# Patient Record
Sex: FEMALE | Race: WHITE | Employment: OTHER | URBAN - METROPOLITAN AREA
[De-identification: names, ages, dates, MRNs, and addresses within clinical notes are randomized per-mention and may not be internally consistent; named-entity substitution may affect disease eponyms.]

---

## 2017-01-04 ENCOUNTER — HOSPITAL ENCOUNTER (OUTPATIENT)
Dept: INTERVENTIONAL RADIOLOGY/VASCULAR | Age: 77
Discharge: HOME | End: 2017-01-04
Attending: RADIOLOGY
Payer: MEDICARE

## 2017-01-04 VITALS
WEIGHT: 151 LBS | HEART RATE: 75 BPM | RESPIRATION RATE: 19 BRPM | DIASTOLIC BLOOD PRESSURE: 74 MMHG | BODY MASS INDEX: 26.75 KG/M2 | OXYGEN SATURATION: 97 % | HEIGHT: 63 IN | SYSTOLIC BLOOD PRESSURE: 165 MMHG | TEMPERATURE: 97.8 F

## 2017-01-04 DIAGNOSIS — L02.91 ABSCESS: ICD-10-CM

## 2017-01-04 PROCEDURE — 74011000250 HC RX REV CODE- 250: Performed by: RADIOLOGY

## 2017-01-04 PROCEDURE — 77030002916 HC SUT ETHLN J&J -A

## 2017-01-04 PROCEDURE — C1769 GUIDE WIRE: HCPCS

## 2017-01-04 PROCEDURE — 49423 EXCHANGE DRAINAGE CATHETER: CPT

## 2017-01-04 PROCEDURE — 77030027478 HC TBNG JP W BLB MRTM -B

## 2017-01-04 PROCEDURE — 74011636320 HC RX REV CODE- 636/320: Performed by: RADIOLOGY

## 2017-01-04 PROCEDURE — C1729 CATH, DRAINAGE: HCPCS

## 2017-01-04 RX ORDER — LIDOCAINE HYDROCHLORIDE 20 MG/ML
100-200 INJECTION, SOLUTION INFILTRATION; PERINEURAL ONCE
Status: COMPLETED | OUTPATIENT
Start: 2017-01-04 | End: 2017-01-04

## 2017-01-04 RX ADMIN — SODIUM BICARBONATE 2 ML: 0.2 INJECTION, SOLUTION INTRAVENOUS at 14:02

## 2017-01-04 RX ADMIN — LIDOCAINE HYDROCHLORIDE 120 MG: 20 INJECTION, SOLUTION INFILTRATION; PERINEURAL at 14:02

## 2017-01-04 RX ADMIN — IOPAMIDOL 8 ML: 612 INJECTION, SOLUTION INTRAVENOUS at 14:04

## 2017-01-04 NOTE — PROGRESS NOTES
Discharge complete. Discharge instructions reviewed with pt. Time for questions allowed. Pt denies any questions at this time. Dressing to abdomen noted to be clean, dry, and intact. Pt assisted to front of hospital where her daughter awaits.      Visit Vitals    /74 (BP 1 Location: Right arm, BP Patient Position: At rest)    Pulse 75    Temp 97.8 °F (36.6 °C)    Resp 19    Ht 5' 3\" (1.6 m)    Wt 68.5 kg (151 lb)    SpO2 97%    Breastfeeding No    BMI 26.75 kg/m2

## 2017-01-04 NOTE — PROGRESS NOTES
TRANSFER - IN REPORT:    Verbal report received from [address], RN (name) on Penny Overton  being received from IR (unit) for routine progression of care      Report consisted of patients Situation, Background, Assessment and   Recommendations(SBAR). Information from the following report(s) Procedure Summary and MAR was reviewed with the receiving nurse. Opportunity for questions and clarification was provided. Assessment completed upon patients arrival to unit and care assumed.

## 2017-01-04 NOTE — IP AVS SNAPSHOT
[address] 
[phone] Patient: Ramos Bob MRN: [id number] OCB:[birth year] You are allergic to the following Allergen Reactions Promethazine Anxiety Hydrocortisone Other (comments) GLAUCOMA Recent Documentation Height Weight Breastfeeding? BMI OB Status Smoking Status 1.6 m 68.5 kg No 26.75 kg/m2 Hysterectomy Former Smoker Emergency Contacts Name Discharge Info Relation Home Work Mobile Leela Clay  Child [2] [phone] Rosa Martel  Child [2] [phone] About your hospitalization You were admitted on:  January 4, 2017 You last received care in the:  [facility] Radiology Specials You were discharged on:  January 4, 2017 Unit phone number:  [phone] Why you were hospitalized Your primary diagnosis was:  Not on File Providers Seen During Your Hospitalizations Provider Role Specialty Primary office phone Argelia Campbell MD Attending Provider Radiology [phone] Your Primary Care Physician (PCP) Primary Care Physician Office Phone Office Fax Liss Hester [phone] Follow-up Information None Current Discharge Medication List  
  
ASK your doctor about these medications Dose & Instructions Dispensing Information Comments Morning Noon Evening Bedtime  
 amLODIPine 5 mg tablet Commonly known as:  Kinza Flores Your next dose is: Today, Tomorrow Other:  _________ TAKE ONE TABLET BY MOUTH EVERY DAY Quantity:  90 Tab Refills:  3 cholecalciferol 1,000 unit Cap Commonly known as:  VITAMIN D3 Your next dose is: Today, Tomorrow Other:  _________ Dose:  1000 Units Take 1,000 Units by mouth daily. Refills:  0  
     
   
   
   
  
 furosemide 20 mg tablet Commonly known as:  LASIX Your next dose is: Today, Tomorrow Other:  _________ Dose:  10 mg Take 0.5 Tabs by mouth every morning. Quantity:  90 Tab Refills:  3 HYDROcodone-acetaminophen 7.5-325 mg per tablet Commonly known as:  Claudio Youngbloodjayme Sadler Your next dose is: Today, Tomorrow Other:  _________ Dose:  1 Tab Take 1 Tab by mouth every eight (8) hours as needed for Pain for up to 30 days. Max Daily Amount: 3 Tabs. Indications: PAIN Quantity:  90 Tab Refills:  0  
     
   
   
   
  
 ondansetron hcl 8 mg tablet Commonly known as:  Ana Beaglshana Your next dose is: Today, Tomorrow Other:  _________ Dose:  8 mg Take 1 Tab by mouth every eight (8) hours as needed for Nausea. Quantity:  20 Tab Refills:  2  
     
   
   
   
  
 pantoprazole 40 mg tablet Commonly known as:  PROTONIX Your next dose is: Today, Tomorrow Other:  _________ Dose:  40 mg Take 1 Tab by mouth Before breakfast and dinner. Quantity:  60 Tab Refills:  1  
     
   
   
   
  
 polyethylene glycol 17 gram/dose powder Commonly known as:  Gege Kem Your next dose is: Today, Tomorrow Other:  _________ Dose:  17 g Take 17 g by mouth daily. May get the over the counter Miralax. Quantity:  234 g Refills:  0 Discharge Instructions Denita [address] Department of Interventional Radiology [facility] 
[phone] Office 
[phone] Fax GENERAL DRAIN DISCHARGE INSTRUCTIONS General Information: A plastic catheter has been inserted through your skin and into area that needs to be drained. Your doctor ordered this procedure to be done in the event of an abscess, or other fluid collection in your body. You will notice a drainage bag attached to the catheter.  When the fluid has all been removed, your doctor will send you back to us for the removal of the catheter. If you are going home with the catheter in place, your doctor may order a home health nurse to come to your house and assist with the care of the catheter. Your doctor will most likely order antibiotic tablets for you to take while you have this tube. Home Care Instructions: You can resume your regular diet and medication regimen. Do not drink alcohol, drive, or make any important legal decisions in the next 24 hours. Do not lift anything heavier than a gallon of milk, or do anything strenuous for the next 24 hours. You should not shower for 24 hours. You should cover the tube with plastic wrap and tape to keep it dry when you shower. You can disconnect the drainage bag while showering. The home health nurse or the nurse who discharges from the hospital will teach you how to do this. Do not take a bath, swim or immerse yourself in water as long as you have this drainage tube. You should clean the tube and change the dressing every  48 hours and as needed. Keep the dressing clean and dry. Keep up with how much drainage you get from the tube and report this to your doctor on each visit. Call If: 
   You should call your Physician and/or the Radiology Nurse if you have any bleeding other than a small spot on your bandage. Call if you have any signs of infection, fever, or increased pain at the site of the tube. Call if you should have leakage around the tube, an increased yellowing of the skin, increased pain in the abdomen, a change in the amount or appearance of the fluid, or if the tube gets pulled out some or all the way out. Follow-Up Instructions: Please see your ordering doctor as he/she has requested. To Reach Us: Interventional Radiology General Nurse Discharge After general anesthesia or intravenous sedation, for 24 hours or while taking prescription Narcotics: · Limit your activities · Do not drive and operate hazardous machinery · Do not make important personal or business decisions · Do  not drink alcoholic beverages · If you have not urinated within 8 hours after discharge, please contact your surgeon on call. * Please give a list of your current medications to your Primary Care Provider. * Please update this list whenever your medications are discontinued, doses are 
   changed, or new medications (including over-the-counter products) are added. * Please carry medication information at all times in case of emergency situations. These are general instructions for a healthy lifestyle: No smoking/ No tobacco products/ Avoid exposure to second hand smoke Surgeon General's Warning:  Quitting smoking now greatly reduces serious risk to your health. Obesity, smoking, and sedentary lifestyle greatly increases your risk for illness A healthy diet, regular physical exercise & weight monitoring are important for maintaining a healthy lifestyle You may be retaining fluid if you have a history of heart failure or if you experience any of the following symptoms:  Weight gain of 3 pounds or more overnight or 5 pounds in a week, increased swelling in our hands or feet or shortness of breath while lying flat in bed. Please call your doctor as soon as you notice any of these symptoms; do not wait until your next office visit. Recognize signs and symptoms of STROKE: 
F-face looks uneven A-arms unable to move or move unevenly S-speech slurred or non-existent T-time-call 911 as soon as signs and symptoms begin-DO NOT go Back to bed or wait to see if you get better-TIME IS BRAIN. If you have any questions about your procedure, please call the Interventional Radiology department at [phone]. After business hours (5pm) and weekends, call the answering service at [phone] and ask for the Radiologist on call to be paged. Patient Signature: 
Date: 1/4/2017 Discharging Nurse: Ibrahima Connell RN Discharge Orders None ACO Transitions of Care Introducing [facility] [employer] offers a voluntary care coordination program to provide high quality service and care to [facility] fee-for-service beneficiaries. Felicity Perez was designed to help you enhance your health and well-being through the following services: ? Transitions of Care  support for individuals who are transitioning from one care setting to another (example: Hospital to home). ? Chronic and Complex Care Coordination  support for individuals and caregivers of those with serious or chronic illnesses or with more than one chronic (ongoing) condition and those who take a number of different medications. If you meet specific medical criteria, a [address] [place] [address] Rd may call you directly to coordinate your care with your primary care physician and your other care providers. For questions about the [facility] programs, please, contact your physicians office. For general questions or additional information about Accountable Care Organizations: 
Please visit www.medicare.gov/acos. html or call 1-800-MEDICARE (8-205.435.7737) TTY users should call 5-902.946.1034. Introducing [facility]! Alexus Cordova introduces [employer] patient portal. Now you can access parts of your medical record, email your doctor's office, and request medication refills online. 1. In your internet browser, go to [email or web address] 2. Click on the First Time User? Click Here link in the Sign In box. You will see the New Member Sign Up page. 3. Enter your [employer] Access Code exactly as it appears below. You will not need to use this code after youve completed the sign-up process. If you do not sign up before the expiration date, you must request a new code.  
 
· [employer] Access Code: [email or web address] 
 Expires: 1/31/2017 11:55 AM 
 
4. Enter the last four digits of your Social Security Number (xxxx) and Date of Birth (mm/dd/yyyy) as indicated and click Submit. You will be taken to the next sign-up page. 5. Create a [employer] ID. This will be your [employer] login ID and cannot be changed, so think of one that is secure and easy to remember. 6. Create a [employer] password. You can change your password at any time. 7. Enter your Password Reset Question and Answer. This can be used at a later time if you forget your password. 8. Enter your e-mail address. You will receive e-mail notification when new information is available in [address] 9. Click Sign Up. You can now view and download portions of your medical record. 10. Click the Download Summary menu link to download a portable copy of your medical information. If you have questions, please visit the Frequently Asked Questions section of the [employer] website. Remember, [employer] is NOT to be used for urgent needs. For medical emergencies, dial 911. Now available from your iPhone and Android! General Information Please provide this summary of care documentation to your next provider. Patient Signature:  ____________________________________________________________ Date:  ____________________________________________________________  
  
[facility] Provider Signature:  ____________________________________________________________ Date:  ____________________________________________________________

## 2017-01-04 NOTE — DISCHARGE INSTRUCTIONS
[address] [facility] [address]  Department of Interventional Radiology  [facility]  [phone] Office  [phone] Fax    GENERAL DRAIN DISCHARGE INSTRUCTIONS    General Information:     A plastic catheter has been inserted through your skin and into area that needs to be drained. Your doctor ordered this procedure to be done in the event of an abscess, or other fluid collection in your body. You will notice a drainage bag attached to the catheter. When the fluid has all been removed, your doctor will send you back to us for the removal of the catheter. If you are going home with the catheter in place, your doctor may order a home health nurse to come to your house and assist with the care of the catheter. Your doctor will most likely order antibiotic tablets for you to take while you have this tube. Home Care Instructions: You can resume your regular diet and medication regimen. Do not drink alcohol, drive, or make any important legal decisions in the next 24 hours. Do not lift anything heavier than a gallon of milk, or do anything strenuous for the next 24 hours. You should not shower for 24 hours. You should cover the tube with plastic wrap and tape to keep it dry when you shower. You can disconnect the drainage bag while showering. The home health nurse or the nurse who discharges from the hospital will teach you how to do this. Do not take a bath, swim or immerse yourself in water as long as you have this drainage tube. You should clean the tube and change the dressing every  48 hours and as needed. Keep the dressing clean and dry. Keep up with how much drainage you get from the tube and report this to your doctor on each visit. Call If:     You should call your Physician and/or the Radiology Nurse if you have any bleeding other than a small spot on your bandage.  Call if you have any signs of infection, fever, or increased pain at the site of the tube. Call if you should have leakage around the tube, an increased yellowing of the skin, increased pain in the abdomen, a change in the amount or appearance of the fluid, or if the tube gets pulled out some or all the way out. Follow-Up Instructions: Please see your ordering doctor as he/she has requested. To Reach Us: Interventional Radiology General Nurse Discharge    After general anesthesia or intravenous sedation, for 24 hours or while taking prescription Narcotics:  · Limit your activities  · Do not drive and operate hazardous machinery  · Do not make important personal or business decisions  · Do  not drink alcoholic beverages  · If you have not urinated within 8 hours after discharge, please contact your surgeon on call. * Please give a list of your current medications to your Primary Care Provider. * Please update this list whenever your medications are discontinued, doses are     changed, or new medications (including over-the-counter products) are added. * Please carry medication information at all times in case of emergency situations. These are general instructions for a healthy lifestyle:    No smoking/ No tobacco products/ Avoid exposure to second hand smoke  Surgeon General's Warning:  Quitting smoking now greatly reduces serious risk to your health. Obesity, smoking, and sedentary lifestyle greatly increases your risk for illness  A healthy diet, regular physical exercise & weight monitoring are important for maintaining a healthy lifestyle    You may be retaining fluid if you have a history of heart failure or if you experience any of the following symptoms:  Weight gain of 3 pounds or more overnight or 5 pounds in a week, increased swelling in our hands or feet or shortness of breath while lying flat in bed. Please call your doctor as soon as you notice any of these symptoms; do not wait until your next office visit.     Recognize signs and symptoms of STROKE:  F-face looks uneven    A-arms unable to move or move unevenly    S-speech slurred or non-existent    T-time-call 911 as soon as signs and symptoms begin-DO NOT go       Back to bed or wait to see if you get better-TIME IS BRAIN. If you have any questions about your procedure, please call the Interventional Radiology department at [phone]. After business hours (5pm) and weekends, call the answering service at [phone] and ask for the Radiologist on call to be paged.      Patient Signature:  Date: 1/4/2017  Discharging Nurse: Mich Helton RN

## 2017-01-04 NOTE — PROGRESS NOTES
TRANSFER - IN REPORT:    Verbal report received from [address], RN (name) on Melodie Nunes  being received from IR (unit) for routine progression of care      Report consisted of patients Situation, Background, Assessment and   Recommendations(SBAR). Information from the following report(s) Procedure Summary and MAR was reviewed with the receiving nurse. Opportunity for questions and clarification was provided. Assessment completed upon patients arrival to unit and care assumed.

## 2017-01-04 NOTE — PROGRESS NOTES
TRANSFER - OUT REPORT:    Verbal report given to [facility] RN(name) on Prachi Bolds  being transferred to IR recovery(unit) for routine progression of care       Report consisted of patients Situation, Background, Assessment and   Recommendations(SBAR). Information from the following report(s) Procedure Summary and MAR was reviewed with the receiving nurse. Lines:       Opportunity for questions and clarification was provided.       Patient transported with:   Registered Nurse

## 2017-01-04 NOTE — PROCEDURES
Interventional Radiology Brief Procedure Note    Patient: Gina Means MRN: [id number]  SSN: [id number]    YOB: 1940  Age: 77 y.o. 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       Follow Up: 2-3 weeks    Signed By: Lorenza Aburto MD     January 4, 2017

## 2017-01-27 ENCOUNTER — HOSPITAL ENCOUNTER (OUTPATIENT)
Dept: INTERVENTIONAL RADIOLOGY/VASCULAR | Age: 77
Discharge: HOME | End: 2017-01-27
Attending: RADIOLOGY
Payer: MEDICARE

## 2017-01-27 VITALS
RESPIRATION RATE: 15 BRPM | OXYGEN SATURATION: 97 % | TEMPERATURE: 98.2 F | WEIGHT: 148 LBS | SYSTOLIC BLOOD PRESSURE: 162 MMHG | DIASTOLIC BLOOD PRESSURE: 72 MMHG | HEART RATE: 66 BPM | BODY MASS INDEX: 26.22 KG/M2 | HEIGHT: 63 IN

## 2017-01-27 DIAGNOSIS — L98.8 FISTULA: ICD-10-CM

## 2017-01-27 PROCEDURE — 49424 ASSESS CYST CONTRAST INJECT: CPT

## 2017-01-27 PROCEDURE — 74011636320 HC RX REV CODE- 636/320: Performed by: RADIOLOGY

## 2017-01-27 PROCEDURE — 77030027478 HC TBNG JP W BLB MRTM -B

## 2017-01-27 PROCEDURE — 74011000250 HC RX REV CODE- 250: Performed by: RADIOLOGY

## 2017-01-27 PROCEDURE — 77030002916 HC SUT ETHLN J&J -A

## 2017-01-27 RX ORDER — LIDOCAINE HYDROCHLORIDE 20 MG/ML
2-20 INJECTION, SOLUTION INFILTRATION; PERINEURAL
Status: DISCONTINUED | OUTPATIENT
Start: 2017-01-27 | End: 2017-01-30 | Stop reason: HOSPADM

## 2017-01-27 RX ADMIN — LIDOCAINE HYDROCHLORIDE 100 MG: 20 INJECTION, SOLUTION INFILTRATION; PERINEURAL at 08:26

## 2017-01-27 RX ADMIN — SODIUM BICARBONATE 2 ML: 0.2 INJECTION, SOLUTION INTRAVENOUS at 08:26

## 2017-01-27 RX ADMIN — IOPAMIDOL 2 ML: 612 INJECTION, SOLUTION INTRAVENOUS at 08:29

## 2017-01-27 NOTE — IP AVS SNAPSHOT
[address] 
[phone] Patient: Antonella Mora MRN: [id number] GSZ:7/1/2010 You are allergic to the following Allergen Reactions Promethazine Anxiety Hydrocortisone Other (comments) GLAUCOMA Recent Documentation Height Weight Breastfeeding? BMI OB Status Smoking Status 1.6 m 67.1 kg No 26.22 kg/m2 Hysterectomy Former Smoker Emergency Contacts Name Discharge Info Relation Home Work Mobile Margret Ortega  Child [2] [phone] Kelechi Lyons  Child [2] [phone] About your hospitalization You were admitted on:  January 27, 2017 You last received care in the:  [facility] You were discharged on:  January 27, 2017 Unit phone number:  [phone] Why you were hospitalized Your primary diagnosis was:  Not on File Providers Seen During Your Hospitalizations Provider Role Specialty Primary office phone Alvaro Chapman MD Attending Provider Radiology [phone] Your Primary Care Physician (PCP) Primary Care Physician Office Phone Office Fax Sva Santiago [phone] Follow-up Information None Current Discharge Medication List  
  
ASK your doctor about these medications Dose & Instructions Dispensing Information Comments Morning Noon Evening Bedtime  
 amLODIPine 5 mg tablet Commonly known as:  Suzon Salle Your next dose is: Today, Tomorrow Other:  _________ TAKE ONE TABLET BY MOUTH EVERY DAY Quantity:  90 Tab Refills:  3 cholecalciferol 1,000 unit Cap Commonly known as:  VITAMIN D3 Your next dose is: Today, Tomorrow Other:  _________ Dose:  1000 Units Take 1,000 Units by mouth daily. Refills:  0  
     
   
   
   
  
 furosemide 20 mg tablet Commonly known as:  LASIX Your next dose is: Today, Tomorrow Other:  _________ Dose:  10 mg Take 0.5 Tabs by mouth every morning. Quantity:  90 Tab Refills:  3 HYDROcodone-acetaminophen 7.5-325 mg per tablet Commonly known as:  Lencho Handler Your next dose is: Today, Tomorrow Other:  _________ Dose:  1 Tab Take 1 Tab by mouth every eight (8) hours as needed for Pain for up to 30 days. Max Daily Amount: 3 Tabs. Indications: PAIN Quantity:  90 Tab Refills:  0  
     
   
   
   
  
 ondansetron hcl 8 mg tablet Commonly known as:  Aaron Kims Your next dose is: Today, Tomorrow Other:  _________ Dose:  8 mg Take 1 Tab by mouth every eight (8) hours as needed for Nausea. Quantity:  20 Tab Refills:  2  
     
   
   
   
  
 pantoprazole 40 mg tablet Commonly known as:  PROTONIX Your next dose is: Today, Tomorrow Other:  _________ Dose:  40 mg Take 1 Tab by mouth Before breakfast and dinner. Quantity:  60 Tab Refills:  1  
     
   
   
   
  
 polyethylene glycol 17 gram/dose powder Commonly known as:  Analimeli Deweysaul Your next dose is: Today, Tomorrow Other:  _________ Dose:  17 g Take 17 g by mouth daily. May get the over the counter Miralax. Quantity:  234 g Refills:  0 Discharge Instructions [address]ita [address] Department of Interventional Radiology [facility] 
[phone] Office 
[phone] Fax GENERAL DRAIN DISCHARGE INSTRUCTIONS General Information: A plastic catheter has been inserted through your skin and into area that needs to be drained. Your doctor ordered this procedure to be done in the event of an abscess, or other fluid collection in your body. You will notice a drainage bag attached to the catheter.  When the fluid has all been removed, your doctor will send you back to us for the removal of the catheter. If you are going home with the catheter in place, your doctor may order a home health nurse to come to your house and assist with the care of the catheter. Your doctor will most likely order antibiotic tablets for you to take while you have this tube. Home Care Instructions: You can resume your regular diet and medication regimen. Do not drink alcohol, drive, or make any important legal decisions in the next 24 hours. Do not lift anything heavier than a gallon of milk, or do anything strenuous for the next 24 hours. You should not shower for 24 hours. You should cover the tube with plastic wrap and tape to keep it dry when you shower. You can disconnect the drainage bag while showering. The home health nurse or the nurse who discharges from the hospital will teach you how to do this. Do not take a bath, swim or immerse yourself in water as long as you have this drainage tube. You should clean the tube and change the dressing every  48 hours and as needed. Keep the dressing clean and dry. Keep up with how much drainage you get from the tube and report this to your doctor on each visit. Call If: 
   You should call your Physician and/or the Radiology Nurse if you have any bleeding other than a small spot on your bandage. Call if you have any signs of infection, fever, or increased pain at the site of the tube. Call if you should have leakage around the tube, an increased yellowing of the skin, increased pain in the abdomen, a change in the amount or appearance of the fluid, or if the tube gets pulled out some or all the way out. Follow-Up Instructions: Please see your ordering doctor as he/she has requested. To Reach Us: If you have any questions about your procedure, please call the Interventional Radiology department at [phone].  After business hours (5pm) and weekends, call the answering service at [phone] and ask for the Radiologist on call to be paged. Interventional Radiology General Nurse Discharge After general anesthesia or intravenous sedation, for 24 hours or while taking prescription Narcotics: · Limit your activities · Do not drive and operate hazardous machinery · Do not make important personal or business decisions · Do  not drink alcoholic beverages · If you have not urinated within 8 hours after discharge, please contact your surgeon on call. * Please give a list of your current medications to your Primary Care Provider. * Please update this list whenever your medications are discontinued, doses are 
   changed, or new medications (including over-the-counter products) are added. * Please carry medication information at all times in case of emergency situations. These are general instructions for a healthy lifestyle: No smoking/ No tobacco products/ Avoid exposure to second hand smoke Surgeon General's Warning:  Quitting smoking now greatly reduces serious risk to your health. Obesity, smoking, and sedentary lifestyle greatly increases your risk for illness A healthy diet, regular physical exercise & weight monitoring are important for maintaining a healthy lifestyle You may be retaining fluid if you have a history of heart failure or if you experience any of the following symptoms:  Weight gain of 3 pounds or more overnight or 5 pounds in a week, increased swelling in our hands or feet or shortness of breath while lying flat in bed. Please call your doctor as soon as you notice any of these symptoms; do not wait until your next office visit. Recognize signs and symptoms of STROKE: 
F-face looks uneven A-arms unable to move or move unevenly S-speech slurred or non-existent T-time-call 911 as soon as signs and symptoms begin-DO NOT go Back to bed or wait to see if you get better-TIME IS BRAIN. Patient Signature: 
Date: 1/27/2017 Discharging Nurse: Chas Sebastian RN Discharge Orders None ACO Transitions of Care Introducing [employer] [address] Dottie Sadler offers a voluntary care coordination program to provide high quality service and care to [facility] fee-for-service beneficiaries. Mayda Cevallos was designed to help you enhance your health and well-being through the following services: ? Transitions of Care  support for individuals who are transitioning from one care setting to another (example: Hospital to home). ? Chronic and Complex Care Coordination  support for individuals and caregivers of those with serious or chronic illnesses or with more than one chronic (ongoing) condition and those who take a number of different medications. If you meet specific medical criteria, a [address] [place] [address] Rd may call you directly to coordinate your care with your primary care physician and your other care providers. For questions about the [facility] programs, please, contact your physicians office. For general questions or additional information about Accountable Care Organizations: 
Please visit www.medicare.gov/acos. html or call 1-800-MEDICARE (3-119.936.3835) TTY users should call 9-955.691.7592. Introducing [facility]! Evelia Owusu introduces Docracy patient portal. Now you can access parts of your medical record, email your doctor's office, and request medication refills online. 1. In your internet browser, go to [email or web address] 2. Click on the First Time User? Click Here link in the Sign In box. You will see the New Member Sign Up page. 3. Enter your Docracy Access Code exactly as it appears below. You will not need to use this code after youve completed the sign-up process.  If you do not sign up before the expiration date, you must request a new code. 
 
· Oink Access Code: [email or web address] Expires: 1/31/2017 11:55 AM 
 
4. Enter the last four digits of your Social Security Number (xxxx) and Date of Birth (mm/dd/yyyy) as indicated and click Submit. You will be taken to the next sign-up page. 5. Create a Oink ID. This will be your Oink login ID and cannot be changed, so think of one that is secure and easy to remember. 6. Create a Oink password. You can change your password at any time. 7. Enter your Password Reset Question and Answer. This can be used at a later time if you forget your password. 8. Enter your e-mail address. You will receive e-mail notification when new information is available in [address] 9. Click Sign Up. You can now view and download portions of your medical record. 10. Click the Download Summary menu link to download a portable copy of your medical information. If you have questions, please visit the Frequently Asked Questions section of the Oink website. Remember, Oink is NOT to be used for urgent needs. For medical emergencies, dial 911. Now available from your iPhone and Android! General Information Please provide this summary of care documentation to your next provider. Patient Signature:  ____________________________________________________________ Date:  ____________________________________________________________  
  
Lemuel Durán Provider Signature:  ____________________________________________________________ Date:  ____________________________________________________________

## 2017-01-27 NOTE — PROGRESS NOTES
I have reviewed discharge instructions with the patient. The patient verbalized understanding. Patient ambulatory to waiting room.  NAD upon discharge

## 2017-01-27 NOTE — DISCHARGE INSTRUCTIONS
[address]ita [address]  Department of Interventional Radiology  [address] [place] [address] [address] [facility]  [phone] Office  [phone] Fax    GENERAL DRAIN DISCHARGE INSTRUCTIONS    General Information:     A plastic catheter has been inserted through your skin and into area that needs to be drained. Your doctor ordered this procedure to be done in the event of an abscess, or other fluid collection in your body. You will notice a drainage bag attached to the catheter. When the fluid has all been removed, your doctor will send you back to us for the removal of the catheter. If you are going home with the catheter in place, your doctor may order a home health nurse to come to your house and assist with the care of the catheter. Your doctor will most likely order antibiotic tablets for you to take while you have this tube. Home Care Instructions: You can resume your regular diet and medication regimen. Do not drink alcohol, drive, or make any important legal decisions in the next 24 hours. Do not lift anything heavier than a gallon of milk, or do anything strenuous for the next 24 hours. You should not shower for 24 hours. You should cover the tube with plastic wrap and tape to keep it dry when you shower. You can disconnect the drainage bag while showering. The home health nurse or the nurse who discharges from the hospital will teach you how to do this. Do not take a bath, swim or immerse yourself in water as long as you have this drainage tube. You should clean the tube and change the dressing every  48 hours and as needed. Keep the dressing clean and dry. Keep up with how much drainage you get from the tube and report this to your doctor on each visit. Call If:     You should call your Physician and/or the Radiology Nurse if you have any bleeding other than a small spot on your bandage.  Call if you have any signs of infection, fever, or increased pain at the site of the tube. Call if you should have leakage around the tube, an increased yellowing of the skin, increased pain in the abdomen, a change in the amount or appearance of the fluid, or if the tube gets pulled out some or all the way out. Follow-Up Instructions: Please see your ordering doctor as he/she has requested. To Reach Us: If you have any questions about your procedure, please call the Interventional Radiology department at [phone]. After business hours (5pm) and weekends, call the answering service at [phone] and ask for the Radiologist on call to be paged. Interventional Radiology General Nurse Discharge    After general anesthesia or intravenous sedation, for 24 hours or while taking prescription Narcotics:  · Limit your activities  · Do not drive and operate hazardous machinery  · Do not make important personal or business decisions  · Do  not drink alcoholic beverages  · If you have not urinated within 8 hours after discharge, please contact your surgeon on call. * Please give a list of your current medications to your Primary Care Provider. * Please update this list whenever your medications are discontinued, doses are     changed, or new medications (including over-the-counter products) are added. * Please carry medication information at all times in case of emergency situations. These are general instructions for a healthy lifestyle:    No smoking/ No tobacco products/ Avoid exposure to second hand smoke  Surgeon General's Warning:  Quitting smoking now greatly reduces serious risk to your health.     Obesity, smoking, and sedentary lifestyle greatly increases your risk for illness  A healthy diet, regular physical exercise & weight monitoring are important for maintaining a healthy lifestyle    You may be retaining fluid if you have a history of heart failure or if you experience any of the following symptoms:  Weight gain of 3 pounds or more overnight or 5 pounds in a week, increased swelling in our hands or feet or shortness of breath while lying flat in bed. Please call your doctor as soon as you notice any of these symptoms; do not wait until your next office visit. Recognize signs and symptoms of STROKE:  F-face looks uneven    A-arms unable to move or move unevenly    S-speech slurred or non-existent    T-time-call 911 as soon as signs and symptoms begin-DO NOT go       Back to bed or wait to see if you get better-TIME IS BRAIN.            Patient Signature:  Date: 1/27/2017  Discharging Nurse: Lorie Ge RN

## 2017-01-27 NOTE — IP AVS SNAPSHOT
Current Discharge Medication List  
  
ASK your doctor about these medications Dose & Instructions Dispensing Information Comments Morning Noon Evening Bedtime  
 amLODIPine 5 mg tablet Commonly known as:  Roxana Blade Your next dose is: Today, Tomorrow Other:  ____________ TAKE ONE TABLET BY MOUTH EVERY DAY Quantity:  90 Tab Refills:  3 cholecalciferol 1,000 unit Cap Commonly known as:  VITAMIN D3 Your next dose is: Today, Tomorrow Other:  ____________ Dose:  1000 Units Take 1,000 Units by mouth daily. Refills:  0  
     
   
   
   
  
 furosemide 20 mg tablet Commonly known as:  LASIX Your next dose is: Today, Tomorrow Other:  ____________ Dose:  10 mg Take 0.5 Tabs by mouth every morning. Quantity:  90 Tab Refills:  3 HYDROcodone-acetaminophen 7.5-325 mg per tablet Commonly known as:  Virginia Oyster Your next dose is: Today, Tomorrow Other:  ____________ Dose:  1 Tab Take 1 Tab by mouth every eight (8) hours as needed for Pain for up to 30 days. Max Daily Amount: 3 Tabs. Indications: PAIN Quantity:  90 Tab Refills:  0  
     
   
   
   
  
 ondansetron hcl 8 mg tablet Commonly known as:  Ranell Pineda Your next dose is: Today, Tomorrow Other:  ____________ Dose:  8 mg Take 1 Tab by mouth every eight (8) hours as needed for Nausea. Quantity:  20 Tab Refills:  2  
     
   
   
   
  
 pantoprazole 40 mg tablet Commonly known as:  PROTONIX Your next dose is: Today, Tomorrow Other:  ____________ Dose:  40 mg Take 1 Tab by mouth Before breakfast and dinner. Quantity:  60 Tab Refills:  1  
     
   
   
   
  
 polyethylene glycol 17 gram/dose powder Commonly known as:  Henrey Sybil Your next dose is: Today, Tomorrow Other:  ____________ Dose:  17 g Take 17 g by mouth daily. May get the over the counter Miralax. Quantity:  234 g Refills:  0

## 2017-01-27 NOTE — PROGRESS NOTES
Patient to IR room for procedure. Patient awake and alert, verbalized name, , and procedure to be performed. Patient moved to procedure table independently.

## 2017-02-08 PROBLEM — K86.3 INFECTED PANCREATIC PSEUDOCYST: Status: ACTIVE | Noted: 2017-02-08

## 2017-02-08 PROBLEM — K63.2 SMALL BOWEL FISTULA: Status: ACTIVE | Noted: 2017-02-08

## 2017-02-22 ENCOUNTER — HOSPITAL ENCOUNTER (OUTPATIENT)
Dept: INTERVENTIONAL RADIOLOGY/VASCULAR | Age: 77
Discharge: HOME | End: 2017-02-22
Attending: RADIOLOGY
Payer: MEDICARE

## 2017-02-22 VITALS
TEMPERATURE: 98.7 F | DIASTOLIC BLOOD PRESSURE: 78 MMHG | RESPIRATION RATE: 18 BRPM | SYSTOLIC BLOOD PRESSURE: 162 MMHG | HEART RATE: 76 BPM | OXYGEN SATURATION: 99 %

## 2017-02-22 DIAGNOSIS — L98.8 FISTULA: ICD-10-CM

## 2017-02-22 LAB — GLUCOSE BLD STRIP.AUTO-MCNC: 81 MG/DL (ref 65–100)

## 2017-02-22 PROCEDURE — 77030002916 HC SUT ETHLN J&J -A

## 2017-02-22 PROCEDURE — 82962 GLUCOSE BLOOD TEST: CPT

## 2017-02-22 PROCEDURE — 77030027478 HC TBNG JP W BLB MRTM -B

## 2017-02-22 PROCEDURE — 49424 ASSESS CYST CONTRAST INJECT: CPT

## 2017-02-22 PROCEDURE — 74011636320 HC RX REV CODE- 636/320: Performed by: PHYSICIAN ASSISTANT

## 2017-02-22 RX ADMIN — IOPAMIDOL 2 ML: 612 INJECTION, SOLUTION INTRAVENOUS at 09:55

## 2017-02-22 NOTE — DISCHARGE INSTRUCTIONS
[address]  Department of Interventional Radiology  [facility]  [phone] Office  [phone] Fax    GENERAL DRAIN DISCHARGE INSTRUCTIONS    General Information:     A plastic catheter has been inserted through your skin and into area that needs to be drained. Your doctor ordered this procedure to be done in the event of an abscess, or other fluid collection in your body. You will notice a drainage bag attached to the catheter. When the fluid has all been removed, your doctor will send you back to us for the removal of the catheter. If you are going home with the catheter in place, your doctor may order a home health nurse to come to your house and assist with the care of the catheter. Your doctor will most likely order antibiotic tablets for you to take while you have this tube. Home Care Instructions: You can resume your regular diet and medication regimen. Do not drink alcohol, drive, or make any important legal decisions in the next 24 hours. Do not lift anything heavier than a gallon of milk, or do anything strenuous for the next 24 hours. You should not shower for 24 hours. You should cover the tube with plastic wrap and tape to keep it dry when you shower. You can disconnect the drainage bag while showering. The home health nurse or the nurse who discharges from the hospital will teach you how to do this. Do not take a bath, swim or immerse yourself in water as long as you have this drainage tube. You should clean the tube and change the dressing every  48 hours and as needed. Keep the dressing clean and dry. Keep up with how much drainage you get from the tube and report this to your doctor on each visit. Call If:     You should call your Physician and/or the Radiology Nurse if you have any bleeding other than a small spot on your bandage.  Call if you have any signs of infection, fever, or increased pain at the site of the tube. Call if you should have leakage around the tube, an increased yellowing of the skin, increased pain in the abdomen, a change in the amount or appearance of the fluid, or if the tube gets pulled out some or all the way out. Follow-Up Instructions: Please see your ordering doctor as he/she has requested. To Reach Us: If you have any questions about your procedure, please call the Interventional Radiology department at [phone]. After business hours (5pm) and weekends, call the answering service at [phone] and ask for the Radiologist on call to be paged.      Patient Signature:  Date: 2/22/2017  Discharging Nurse: Deondre Barney RN

## 2017-02-22 NOTE — PROGRESS NOTES
Interventional Radiology Prep Area Handoff      Allergies   Allergen Reactions    Promethazine Anxiety    Hydrocortisone Other (comments)     GLAUCOMA         IRSummary reviewed. Pt identified with two identifiers. Verified procedure with Patient and IR Provider as abscessogram.    Consent obtained: y H&P complete/updated: n/a MD airway complete: n/a    Sedation:n   NPO: y   Anticoagulation: n     Pt shaved: n/a   Antibiotics: n  Anesthesia notified: n/a    Additional Notes: none      Lines:  Peripherally Inserted Central Catheter:     Central Venous Catheter:     Venous Access Device:     Peripheral Intravenous Line:     Hemodialysis Catheter:     Drain(s):  Biliary Drain 01/04/17 Abdomen;Right (Active)       Labs verified: y  No results found for this or any previous visit (from the past 24 hour(s)).   Patient Vitals for the past 8 hrs:   Temp Pulse Resp BP SpO2   02/22/17 0845 98.7 °F (37.1 °C) 70 18 171/74 98 %

## 2017-02-22 NOTE — PROGRESS NOTES
TRANSFER - OUT REPORT:    Verbal report given to Michael Adame RN(name) on Ciaran Valdovinos  being transferred to IR Recovery(unit) for routine progression of care       Report consisted of patients Situation, Background, Assessment and   Recommendations(SBAR). Information from the following report(s) SBAR, Procedure Summary and MAR was reviewed with the receiving nurse. Lines:       Opportunity for questions and clarification was provided.       Patient transported with:   Registered Nurse

## 2017-02-22 NOTE — IP AVS SNAPSHOT
[address] 
[phone] Patient: Tiarra Burgess MRN: [id number] OOM:1/4/0483 You are allergic to the following Allergen Reactions Promethazine Anxiety Hydrocortisone Other (comments) GLAUCOMA Recent Documentation Breastfeeding? No       
  
  
 
  
  
  
Emergency Contacts Name Discharge Info Relation Home Work Mobile Herberth Mi  Child [2] [phone] Maximino Marin  Child [2] [phone] About your hospitalization You were admitted on:  February 22, 2017 You last received care in the:  [facility] You were discharged on:  February 22, 2017 Unit phone number:  [phone] Why you were hospitalized Your primary diagnosis was:  Not on File Providers Seen During Your Hospitalizations Provider Role Specialty Primary office phone Stephanie Crenshaw MD Attending Provider Radiology [phone] Your Primary Care Physician (PCP) Primary Care Physician Office Phone Office Fax [facility] [phone] Follow-up Information None Current Discharge Medication List  
  
ASK your doctor about these medications Dose & Instructions Dispensing Information Comments Morning Noon Evening Bedtime  
 amLODIPine 5 mg tablet Commonly known as:  Alison Geoffrey Your next dose is: Today, Tomorrow Other:  _________ TAKE ONE TABLET BY MOUTH EVERY DAY Quantity:  90 Tab Refills:  3 cholecalciferol 1,000 unit Cap Commonly known as:  VITAMIN D3 Your next dose is: Today, Tomorrow Other:  _________ Dose:  1000 Units Take 1,000 Units by mouth daily. Refills:  0  
     
   
   
   
  
 furosemide 20 mg tablet Commonly known as:  LASIX Your next dose is: Today, Tomorrow Other:  _________ Dose:  10 mg Take 0.5 Tabs by mouth every morning. Quantity:  90 Tab Refills:  3  
     
   
   
   
  
 * HYDROcodone-acetaminophen  mg tablet Commonly known as:  Benetta Pock Your next dose is: Today, Tomorrow Other:  _________ Dose:  1 Tab Take 1 Tab by mouth three (3) times daily for 30 days. Max Daily Amount: 3 Tabs. Quantity:  90 Tab Refills:  0  
     
   
   
   
  
 * HYDROcodone-acetaminophen  mg tablet Commonly known as:  Benestalina Pock Start taking on:  3/11/2017 Your next dose is: Today, Tomorrow Other:  _________ Dose:  1 Tab Take 1 Tab by mouth three (3) times daily for 30 days. Max Daily Amount: 3 Tabs. Quantity:  90 Tab Refills:  0  
     
   
   
   
  
 pantoprazole 40 mg tablet Commonly known as:  PROTONIX Your next dose is: Today, Tomorrow Other:  _________ Dose:  40 mg Take 1 Tab by mouth Before breakfast and dinner. Quantity:  60 Tab Refills:  11  
     
   
   
   
  
 polyethylene glycol 17 gram/dose powder Commonly known as:  Desiree Clause Your next dose is: Today, Tomorrow Other:  _________ Dose:  17 g Take 17 g by mouth daily. May get the over the counter Miralax. Quantity:  234 g Refills:  0  
     
   
   
   
  
 * Notice: This list has 2 medication(s) that are the same as other medications prescribed for you. Read the directions carefully, and ask your doctor or other care provider to review them with you. Discharge Instructions [address] Department of Interventional Radiology [address] [place] [address] [facility] 
[phone] Office 
[phone] Fax GENERAL DRAIN DISCHARGE INSTRUCTIONS General Information: A plastic catheter has been inserted through your skin and into area that needs to be drained.  Your doctor ordered this procedure to be done in the event of an abscess, or other fluid collection in your body. You will notice a drainage bag attached to the catheter. When the fluid has all been removed, your doctor will send you back to us for the removal of the catheter. If you are going home with the catheter in place, your doctor may order a home health nurse to come to your house and assist with the care of the catheter. Your doctor will most likely order antibiotic tablets for you to take while you have this tube. Home Care Instructions: You can resume your regular diet and medication regimen. Do not drink alcohol, drive, or make any important legal decisions in the next 24 hours. Do not lift anything heavier than a gallon of milk, or do anything strenuous for the next 24 hours. You should not shower for 24 hours. You should cover the tube with plastic wrap and tape to keep it dry when you shower. You can disconnect the drainage bag while showering. The home health nurse or the nurse who discharges from the hospital will teach you how to do this. Do not take a bath, swim or immerse yourself in water as long as you have this drainage tube. You should clean the tube and change the dressing every  48 hours and as needed. Keep the dressing clean and dry. Keep up with how much drainage you get from the tube and report this to your doctor on each visit. Call If: 
   You should call your Physician and/or the Radiology Nurse if you have any bleeding other than a small spot on your bandage. Call if you have any signs of infection, fever, or increased pain at the site of the tube. Call if you should have leakage around the tube, an increased yellowing of the skin, increased pain in the abdomen, a change in the amount or appearance of the fluid, or if the tube gets pulled out some or all the way out. Follow-Up Instructions: Please see your ordering doctor as he/she has requested. To Reach Us: If you have any questions about your procedure, please call the Interventional Radiology department at [phone]. After business hours (5pm) and weekends, call the answering service at [phone] and ask for the Radiologist on call to be paged. Patient Signature: 
Date: 2/22/2017 Discharging Nurse: Savanah Cuellar RN Discharge Orders None ACO Transitions of Care Introducing [employer] [address] Dottie Sadler offers a voluntary care coordination program to provide high quality service and care to [facility] fee-for-service beneficiaries. Judith Anton was designed to help you enhance your health and well-being through the following services: ? Transitions of Care  support for individuals who are transitioning from one care setting to another (example: Hospital to home). ? Chronic and Complex Care Coordination  support for individuals and caregivers of those with serious or chronic illnesses or with more than one chronic (ongoing) condition and those who take a number of different medications. If you meet specific medical criteria, a [address] [place] [address] Rd may call you directly to coordinate your care with your primary care physician and your other care providers. For questions about the [facility] programs, please, contact your physicians office. For general questions or additional information about Accountable Care Organizations: 
Please visit www.medicare.gov/acos. html or call 1-800-MEDICARE (6-627.578.6539) TTY users should call 4-309.718.8683. Introducing [facility]! Minerva Cruz introduces [employer] patient portal. Now you can access parts of your medical record, email your doctor's office, and request medication refills online. 1. In your internet browser, go to [email or web address] 2. Click on the First Time User? Click Here link in the Sign In box.  You will see the New Member Sign Up page. 3. Enter your [employer] Access Code exactly as it appears below. You will not need to use this code after youve completed the sign-up process. If you do not sign up before the expiration date, you must request a new code. · [employer] Access Code: [email or web address] Expires: 5/9/2017 12:00 PM 
 
4. Enter the last four digits of your Social Security Number (xxxx) and Date of Birth (mm/dd/yyyy) as indicated and click Submit. You will be taken to the next sign-up page. 5. Create a [employer] ID. This will be your [employer] login ID and cannot be changed, so think of one that is secure and easy to remember. 6. Create a [employer] password. You can change your password at any time. 7. Enter your Password Reset Question and Answer. This can be used at a later time if you forget your password. 8. Enter your e-mail address. You will receive e-mail notification when new information is available in [address] 9. Click Sign Up. You can now view and download portions of your medical record. 10. Click the Download Summary menu link to download a portable copy of your medical information. If you have questions, please visit the Frequently Asked Questions section of the [employer] website. Remember, [employer] is NOT to be used for urgent needs. For medical emergencies, dial 911. Now available from your iPhone and Android! General Information Please provide this summary of care documentation to your next provider. Patient Signature:  ____________________________________________________________ Date:  ____________________________________________________________  
  
Devang Sanders Provider Signature:  ____________________________________________________________ Date:  ____________________________________________________________

## 2017-02-22 NOTE — PROGRESS NOTES
TRANSFER - IN REPORT:    Verbal report received from [facility], RN (name) on Mariusz Jorge  being received from IR (unit) for routine progression of care      Report consisted of patients Situation, Background, Assessment and   Recommendations(SBAR). Information from the following report(s) Procedure Summary and MAR was reviewed with the receiving nurse. Opportunity for questions and clarification was provided. Assessment completed upon patients arrival to unit and care assumed.

## 2017-02-22 NOTE — PROGRESS NOTES
OK to discharge per YEMI Weeks PA-C. Discharge complete. Discharge instructions reviewed with pt. Time for questions allowed. Pt denies any questions at this time. Dressing to abdomnen noted to be clean, dry, and intact. Pt assisted to front of hospital with daughter.      Visit Vitals    /78 (BP 1 Location: Right arm, BP Patient Position: At rest)    Pulse 76    Temp 98.7 °F (37.1 °C)    Resp 18    SpO2 99%    Breastfeeding No

## 2017-03-07 ENCOUNTER — HOSPITAL ENCOUNTER (OUTPATIENT)
Dept: INTERVENTIONAL RADIOLOGY/VASCULAR | Age: 77
Discharge: HOME | End: 2017-03-07
Attending: RADIOLOGY
Payer: MEDICARE

## 2017-03-07 ENCOUNTER — HOSPITAL ENCOUNTER (OUTPATIENT)
Dept: CT IMAGING | Age: 77
Discharge: HOME | End: 2017-03-07
Payer: MEDICARE

## 2017-03-07 ENCOUNTER — HOSPITAL ENCOUNTER (OUTPATIENT)
Dept: INTERVENTIONAL RADIOLOGY/VASCULAR | Age: 77
Discharge: HOME | End: 2017-03-07
Payer: MEDICARE

## 2017-03-07 VITALS — WEIGHT: 150 LBS | BODY MASS INDEX: 26.58 KG/M2 | HEIGHT: 63 IN

## 2017-03-07 VITALS
HEIGHT: 63 IN | DIASTOLIC BLOOD PRESSURE: 70 MMHG | OXYGEN SATURATION: 100 % | HEART RATE: 58 BPM | WEIGHT: 150 LBS | RESPIRATION RATE: 18 BRPM | TEMPERATURE: 98.1 F | SYSTOLIC BLOOD PRESSURE: 159 MMHG | BODY MASS INDEX: 26.58 KG/M2

## 2017-03-07 DIAGNOSIS — K85.90 PANCREATITIS: ICD-10-CM

## 2017-03-07 LAB — CREAT BLD-MCNC: 0.9 MG/DL (ref 0.6–1)

## 2017-03-07 PROCEDURE — 74011636320 HC RX REV CODE- 636/320: Performed by: RADIOLOGY

## 2017-03-07 PROCEDURE — 77030002916 HC SUT ETHLN J&J -A

## 2017-03-07 PROCEDURE — 76080 X-RAY EXAM OF FISTULA: CPT

## 2017-03-07 PROCEDURE — 49424 ASSESS CYST CONTRAST INJECT: CPT

## 2017-03-07 PROCEDURE — C1769 GUIDE WIRE: HCPCS

## 2017-03-07 PROCEDURE — 77030027478 HC TBNG JP W BLB MRTM -B

## 2017-03-07 PROCEDURE — 74177 CT ABD & PELVIS W/CONTRAST: CPT

## 2017-03-07 PROCEDURE — 74011636320 HC RX REV CODE- 636/320

## 2017-03-07 PROCEDURE — 82565 ASSAY OF CREATININE: CPT

## 2017-03-07 PROCEDURE — 20501 NJX SINUS TRACT DIAGNOSTIC: CPT

## 2017-03-07 PROCEDURE — 74011000258 HC RX REV CODE- 258

## 2017-03-07 RX ORDER — SODIUM CHLORIDE 0.9 % (FLUSH) 0.9 %
10 SYRINGE (ML) INJECTION
Status: COMPLETED | OUTPATIENT
Start: 2017-03-07 | End: 2017-03-07

## 2017-03-07 RX ORDER — LIDOCAINE HYDROCHLORIDE 20 MG/ML
100-200 INJECTION, SOLUTION INFILTRATION; PERINEURAL ONCE
Status: ACTIVE | OUTPATIENT
Start: 2017-03-07 | End: 2017-03-08

## 2017-03-07 RX ADMIN — Medication 10 ML: at 10:51

## 2017-03-07 RX ADMIN — SODIUM CHLORIDE 100 ML: 900 INJECTION, SOLUTION INTRAVENOUS at 10:51

## 2017-03-07 RX ADMIN — IOPAMIDOL 100 ML: 755 INJECTION, SOLUTION INTRAVENOUS at 14:04

## 2017-03-07 RX ADMIN — DIATRIZOATE MEGLUMINE AND DIATRIZOATE SODIUM 15 ML: 660; 100 LIQUID ORAL; RECTAL at 10:51

## 2017-03-07 RX ADMIN — IOVERSOL 100 ML: 741 INJECTION INTRA-ARTERIAL; INTRAVENOUS at 10:51

## 2017-03-07 NOTE — PROGRESS NOTES
TRANSFER - OUT REPORT:    Verbal report given to Maria R Hinojosa RN (name) on Clakarlos Setters  being transferred to IR Recovery (unit) for routine progression of care       Report consisted of patients Situation, Background, Assessment and   Recommendations(SBAR). Information from the following report(s) Procedure Summary and MAR was reviewed with the receiving nurse. Lines:       Opportunity for questions and clarification was provided.       Patient transported with:   Registered Nurse

## 2017-03-07 NOTE — IP AVS SNAPSHOT
Current Discharge Medication List  
  
ASK your doctor about these medications Dose & Instructions Dispensing Information Comments Morning Noon Evening Bedtime  
 amLODIPine 5 mg tablet Commonly known as:  Suzon Salle Your next dose is: Today, Tomorrow Other:  ____________ TAKE ONE TABLET BY MOUTH EVERY DAY Quantity:  90 Tab Refills:  3 cholecalciferol 1,000 unit Cap Commonly known as:  VITAMIN D3 Your next dose is: Today, Tomorrow Other:  ____________ Dose:  1000 Units Take 1,000 Units by mouth daily. Refills:  0  
     
   
   
   
  
 furosemide 20 mg tablet Commonly known as:  LASIX Your next dose is: Today, Tomorrow Other:  ____________ Dose:  10 mg Take 0.5 Tabs by mouth every morning. Quantity:  90 Tab Refills:  3  
     
   
   
   
  
 * HYDROcodone-acetaminophen  mg tablet Commonly known as:  Alli Songster Your next dose is: Today, Tomorrow Other:  ____________ Dose:  1 Tab Take 1 Tab by mouth three (3) times daily for 30 days. Max Daily Amount: 3 Tabs. Quantity:  90 Tab Refills:  0  
     
   
   
   
  
 * HYDROcodone-acetaminophen  mg tablet Commonly known as:  Alli Songster Start taking on:  3/11/2017 Your next dose is: Today, Tomorrow Other:  ____________ Dose:  1 Tab Take 1 Tab by mouth three (3) times daily for 30 days. Max Daily Amount: 3 Tabs. Quantity:  90 Tab Refills:  0  
     
   
   
   
  
 pantoprazole 40 mg tablet Commonly known as:  PROTONIX Your next dose is: Today, Tomorrow Other:  ____________ Dose:  40 mg Take 1 Tab by mouth Before breakfast and dinner. Quantity:  60 Tab Refills:  11  
     
   
   
   
  
 polyethylene glycol 17 gram/dose powder Commonly known as:  Angelo Deck Your next dose is: Today, Tomorrow Other:  ____________ Dose:  17 g Take 17 g by mouth daily. May get the over the counter Miralax. Quantity:  234 g Refills:  0  
     
   
   
   
  
 * Notice: This list has 2 medication(s) that are the same as other medications prescribed for you. Read the directions carefully, and ask your doctor or other care provider to review them with you.

## 2017-03-07 NOTE — DISCHARGE INSTRUCTIONS
[address]  Department of Interventional Radiology  [facility]  [phone] Office  [phone] Fax    DRAIN/PORT/CATHETER REMOVAL  DISCHARGE INSTRUCTIONS    General Information:     Your doctor has ordered for us to remove your drain, port, or catheter. This could be that you do not need it anymore, it is not doing its job, your physician has decided on another plan for your treatment and/or it may need replacing. Home Care Instructions: You can resume your regular diet and medication regimen. Do not drink alcohol, drive, or make any important legal decisions in the next 24 hours. Do not lift anything heavier than a gallon of milk, or do anything strenuous for the next 24 hours. You will notice a dressing over the site of the removal. This dressing should stay in place until the site is healed. The dressing should be changed at least every 48 hours. You should change the dressing sooner if it becomes soiled or wet. The nurse who discharges you to home should review with you any wound care instructions. Resume your normal level of activity slowly. You may shower after 24 hours, but do not take a bath, swim or immerse yourself in water until the site has healed, and keep the dressing dry with plastic wrap while showering. The site may ooze for a couple of days. This drainage should lessen with each passing day. Call If:     You should call your Physician and/or the Radiology Nurse if you have any bleeding other than a small spot on your bandage. Call if you have any signs of infection, fever, or increased pain at the site of the tube. Call if the oozing increases, if it changes color, or begins to have an odor. Follow-Up Instructions: Please see your ordering doctor as he/she has requested. To Reach Us: If you have any questions about your procedure, please call the Interventional Radiology department at [phone].  After business hours (5pm) and weekends, call the answering service at [phone] and ask for the Radiologist on call to be paged. Interventional Radiology General Nurse Discharge    After general anesthesia or intravenous sedation, for 24 hours or while taking prescription Narcotics:  · Limit your activities  · Do not drive and operate hazardous machinery  · Do not make important personal or business decisions  · Do  not drink alcoholic beverages  · If you have not urinated within 8 hours after discharge, please contact your surgeon on call. * Please give a list of your current medications to your Primary Care Provider. * Please update this list whenever your medications are discontinued, doses are     changed, or new medications (including over-the-counter products) are added. * Please carry medication information at all times in case of emergency situations. These are general instructions for a healthy lifestyle:    No smoking/ No tobacco products/ Avoid exposure to second hand smoke  Surgeon General's Warning:  Quitting smoking now greatly reduces serious risk to your health. Obesity, smoking, and sedentary lifestyle greatly increases your risk for illness  A healthy diet, regular physical exercise & weight monitoring are important for maintaining a healthy lifestyle    You may be retaining fluid if you have a history of heart failure or if you experience any of the following symptoms:  Weight gain of 3 pounds or more overnight or 5 pounds in a week, increased swelling in our hands or feet or shortness of breath while lying flat in bed. Please call your doctor as soon as you notice any of these symptoms; do not wait until your next office visit.     Recognize signs and symptoms of STROKE:  F-face looks uneven    A-arms unable to move or move unevenly    S-speech slurred or non-existent    T-time-call 911 as soon as signs and symptoms begin-DO NOT go       Back to bed or wait to see if you get better-TIME IS BRAIN.         Patient Signature:  Date: 3/7/2017  Discharging Nurse: Corinna Braden

## 2017-03-07 NOTE — IP AVS SNAPSHOT
[address] 
[phone] Patient: Avis Swanson MRN: [id number] [facility]:0/3/5632 You are allergic to the following Allergen Reactions Promethazine Anxiety Hydrocortisone Other (comments) GLAUCOMA Recent Documentation Height Weight Breastfeeding? BMI OB Status Smoking Status 1.6 m 68 kg No 26.57 kg/m2 Hysterectomy Former Smoker Emergency Contacts Name Discharge Info Relation Home Work Mobile Makeda Morrow  Child [2] [phone] Rasheed Chavarria  Child [2] [phone] About your hospitalization You were admitted on:  March 7, 2017 You last received care in the:  [facility] Radiology [facility] You were discharged on:  March 7, 2017 Unit phone number:  [phone] Why you were hospitalized Your primary diagnosis was:  Not on File Your Primary Care Physician (PCP) Primary Care Physician Office Phone Office Fax Alli Zhaos [phone] Follow-up Information None Your Appointments Monday March 20, 2017  8:30 AM EDT  
IR INJ ABS CYST VIA CATH/TBE with [facility] IR UNIT 2, [facility] IR RADIOLOGIST RESOURCE, [facility] IR ANES NOT REQUIRED  
[facility] ([address] [place] [address]) [address] [place] [address]  
[phone] OUTSIDE FILMS - Any outside films related to the study being scheduled should be brought with you on the day of the exam if available. MEDICATIONS - Patient must bring all medications currently taking to include prescriptions, over-the-counter meds, herbals, vitamins, and any dietary supplements. GENERAL INSTRUCTIONS - Must have someone to drive you home after the procedure, unless instructed otherwise by the Angio department. - For patients receiving Sedation: Patient must have nothing to eat or drink (NPO) after midnight.  - If Lab work is required: Encourage patient to have lab work completed PRIOR to arrival day of procedure. Current Discharge Medication List  
  
ASK your doctor about these medications Dose & Instructions Dispensing Information Comments Morning Noon Evening Bedtime  
 amLODIPine 5 mg tablet Commonly known as:  Prabhakar Mullet Your next dose is: Today, Tomorrow Other:  _________ TAKE ONE TABLET BY MOUTH EVERY DAY Quantity:  90 Tab Refills:  3 cholecalciferol 1,000 unit Cap Commonly known as:  VITAMIN D3 Your next dose is: Today, Tomorrow Other:  _________ Dose:  1000 Units Take 1,000 Units by mouth daily. Refills:  0  
     
   
   
   
  
 furosemide 20 mg tablet Commonly known as:  LASIX Your next dose is: Today, Tomorrow Other:  _________ Dose:  10 mg Take 0.5 Tabs by mouth every morning. Quantity:  90 Tab Refills:  3  
     
   
   
   
  
 * HYDROcodone-acetaminophen  mg tablet Commonly known as:  Rick Dolphin Your next dose is: Today, Tomorrow Other:  _________ Dose:  1 Tab Take 1 Tab by mouth three (3) times daily for 30 days. Max Daily Amount: 3 Tabs. Quantity:  90 Tab Refills:  0  
     
   
   
   
  
 * HYDROcodone-acetaminophen  mg tablet Commonly known as:  Rick Dolphin Start taking on:  3/11/2017 Your next dose is: Today, Tomorrow Other:  _________ Dose:  1 Tab Take 1 Tab by mouth three (3) times daily for 30 days. Max Daily Amount: 3 Tabs. Quantity:  90 Tab Refills:  0  
     
   
   
   
  
 pantoprazole 40 mg tablet Commonly known as:  PROTONIX Your next dose is: Today, Tomorrow Other:  _________ Dose:  40 mg Take 1 Tab by mouth Before breakfast and dinner. Quantity:  60 Tab Refills:  11  
     
   
   
   
  
 polyethylene glycol 17 gram/dose powder Commonly known as:  Roberto Lozano Your next dose is: Today, Tomorrow Other:  _________ Dose:  17 g Take 17 g by mouth daily. May get the over the counter Miralax. Quantity:  234 g Refills:  0  
     
   
   
   
  
 * Notice: This list has 2 medication(s) that are the same as other medications prescribed for you. Read the directions carefully, and ask your doctor or other care provider to review them with you. Discharge Instructions [address] Department of Interventional Radiology [facility] 
[phone] Office 
[phone] Fax DRAIN/PORT/CATHETER REMOVAL DISCHARGE INSTRUCTIONS General Information: 
   Your doctor has ordered for us to remove your drain, port, or catheter. This could be that you do not need it anymore, it is not doing its job, your physician has decided on another plan for your treatment and/or it may need replacing. Home Care Instructions: You can resume your regular diet and medication regimen. Do not drink alcohol, drive, or make any important legal decisions in the next 24 hours. Do not lift anything heavier than a gallon of milk, or do anything strenuous for the next 24 hours. You will notice a dressing over the site of the removal. This dressing should stay in place until the site is healed. The dressing should be changed at least every 48 hours. You should change the dressing sooner if it becomes soiled or wet. The nurse who discharges you to home should review with you any wound care instructions. Resume your normal level of activity slowly. You may shower after 24 hours, but do not take a bath, swim or immerse yourself in water until the site has healed, and keep the dressing dry with plastic wrap while showering. The site may ooze for a couple of days. This drainage should lessen with each passing day.  
 
Call If: 
 You should call your Physician and/or the Radiology Nurse if you have any bleeding other than a small spot on your bandage. Call if you have any signs of infection, fever, or increased pain at the site of the tube. Call if the oozing increases, if it changes color, or begins to have an odor. Follow-Up Instructions: Please see your ordering doctor as he/she has requested. To Reach Us: If you have any questions about your procedure, please call the Interventional Radiology department at [phone]. After business hours (5pm) and weekends, call the answering service at [phone] and ask for the Radiologist on call to be paged. Interventional Radiology General Nurse Discharge After general anesthesia or intravenous sedation, for 24 hours or while taking prescription Narcotics: · Limit your activities · Do not drive and operate hazardous machinery · Do not make important personal or business decisions · Do  not drink alcoholic beverages · If you have not urinated within 8 hours after discharge, please contact your surgeon on call. * Please give a list of your current medications to your Primary Care Provider. * Please update this list whenever your medications are discontinued, doses are 
   changed, or new medications (including over-the-counter products) are added. * Please carry medication information at all times in case of emergency situations. These are general instructions for a healthy lifestyle: No smoking/ No tobacco products/ Avoid exposure to second hand smoke Surgeon General's Warning:  Quitting smoking now greatly reduces serious risk to your health. Obesity, smoking, and sedentary lifestyle greatly increases your risk for illness A healthy diet, regular physical exercise & weight monitoring are important for maintaining a healthy lifestyle You may be retaining fluid if you have a history of heart failure or if you experience any of the following symptoms:  Weight gain of 3 pounds or more overnight or 5 pounds in a week, increased swelling in our hands or feet or shortness of breath while lying flat in bed. Please call your doctor as soon as you notice any of these symptoms; do not wait until your next office visit. Recognize signs and symptoms of STROKE: 
F-face looks uneven A-arms unable to move or move unevenly S-speech slurred or non-existent T-time-call 911 as soon as signs and symptoms begin-DO NOT go Back to bed or wait to see if you get better-TIME IS BRAIN. Patient Signature: 
Date: 3/7/2017 Discharging Nurse: Tapan Michaels Discharge Orders None ACO Transitions of Care Introducing [employer] [address] Dottie Sadler offers a voluntary care coordination program to provide high quality service and care to [facility] fee-for-service beneficiaries. Judtih Anton was designed to help you enhance your health and well-being through the following services: ? Transitions of Care  support for individuals who are transitioning from one care setting to another (example: Hospital to home). ? Chronic and Complex Care Coordination  support for individuals and caregivers of those with serious or chronic illnesses or with more than one chronic (ongoing) condition and those who take a number of different medications. If you meet specific medical criteria, a [facility] [address] may call you directly to coordinate your care with your primary care physician and your other care providers. For questions about the [facility] programs, please, contact your physicians office. For general questions or additional information about Accountable Care Organizations: 
Please visit www.medicare.gov/acos. html or call 1-800-MEDICARE (6-803.684.7663) TTY users should call 2-676.180.9855. Introducing [facility]! Varsha Nelson introduces [employer] patient portal. Now you can access parts of your medical record, email your doctor's office, and request medication refills online. 1. In your internet browser, go to [email or web address] 2. Click on the First Time User? Click Here link in the Sign In box. You will see the New Member Sign Up page. 3. Enter your [employer] Access Code exactly as it appears below. You will not need to use this code after youve completed the sign-up process. If you do not sign up before the expiration date, you must request a new code. · [employer] Access Code: [email or web address] Expires: 5/9/2017 12:00 PM 
 
4. Enter the last four digits of your Social Security Number (xxxx) and Date of Birth (mm/dd/yyyy) as indicated and click Submit. You will be taken to the next sign-up page. 5. Create a [employer] ID. This will be your [employer] login ID and cannot be changed, so think of one that is secure and easy to remember. 6. Create a [employer] password. You can change your password at any time. 7. Enter your Password Reset Question and Answer. This can be used at a later time if you forget your password. 8. Enter your e-mail address. You will receive e-mail notification when new information is available in [address] 9. Click Sign Up. You can now view and download portions of your medical record. 10. Click the Download Summary menu link to download a portable copy of your medical information. If you have questions, please visit the Frequently Asked Questions section of the [employer] website. Remember, [employer] is NOT to be used for urgent needs. For medical emergencies, dial 911. Now available from your iPhone and Android! General Information Please provide this summary of care documentation to your next provider. Patient Signature:  ____________________________________________________________ Date:  ____________________________________________________________  
  
Angelo Police Provider Signature:  ____________________________________________________________ Date:  ____________________________________________________________

## 2017-03-07 NOTE — PROGRESS NOTES
Recovery period without difficulty. Pt alert and oriented and denies pain. Dressing is clean, dry, and intact. Reviewed discharge instructions with patient and daughter, both verbalized understanding. Pt escorted to lobby discharge area via wheelchair. Vital signs completed.

## 2017-03-07 NOTE — PROGRESS NOTES
Interventional Radiology Prep Area Handoff      Allergies   Allergen Reactions    Promethazine Anxiety    Hydrocortisone Other (comments)     GLAUCOMA         IRSummary reviewed. Pt identified with two identifiers. Verified procedure with Patient and IR Provider as Drain evaluation.     Consent obtained: yes H&P complete/updated: juanito SALINAS airway complete: n/a    Sedation:no   NPO: yes   Anticoagulation: no     Pt shaved: yes   Antibiotics: no  Anesthesia notified: no    Additional Notes: no      Lines:  Peripherally Inserted Central Catheter:     Central Venous Catheter:     Venous Access Device:     Peripheral Intravenous Line:     Hemodialysis Catheter:     Drain(s):  Biliary Drain 01/04/17 Abdomen;Right (Active)       Labs verified: yes  Recent Results (from the past 24 hour(s))   MRI/CT POC CREATININE    Collection Time: 03/07/17  9:32 AM   Result Value Ref Range    Creatinine (POC) 0.9 0.6 - 1.0 MG/DL    GFR-AA (POC) >60 >60 ml/min/1.73m2    GFR, non-AA (POC) >60 >60 ml/min/1.73m2     Patient Vitals for the past 8 hrs:   Temp Pulse Resp BP SpO2   03/07/17 1215 98.1 °F (36.7 °C) (!) 58 18 159/70 99 %

## 2018-03-07 ENCOUNTER — HOSPITAL ENCOUNTER (OUTPATIENT)
Dept: MRI IMAGING | Age: 78
Discharge: HOME | End: 2018-03-07
Attending: INTERNAL MEDICINE
Payer: MEDICARE

## 2018-03-07 DIAGNOSIS — Z87.19 PERSONAL HISTORY OF OTHER DISEASES OF THE DIGESTIVE SYSTEM (CODE): ICD-10-CM

## 2018-03-07 DIAGNOSIS — R10.11 RIGHT UPPER QUADRANT PAIN: ICD-10-CM

## 2018-03-07 PROCEDURE — 74181 MRI ABDOMEN W/O CONTRAST: CPT

## 2018-03-14 ENCOUNTER — ANESTHESIA EVENT (OUTPATIENT)
Dept: ENDOSCOPY | Age: 78
End: 2018-03-14
Payer: MEDICARE

## 2018-03-15 ENCOUNTER — ANESTHESIA (OUTPATIENT)
Dept: ENDOSCOPY | Age: 78
End: 2018-03-15
Payer: MEDICARE

## 2018-03-15 ENCOUNTER — HOSPITAL ENCOUNTER (OUTPATIENT)
Age: 78
Setting detail: OUTPATIENT SURGERY
Discharge: HOME | End: 2018-03-15
Attending: INTERNAL MEDICINE | Admitting: INTERNAL MEDICINE
Payer: MEDICARE

## 2018-03-15 ENCOUNTER — APPOINTMENT (OUTPATIENT)
Dept: GENERAL RADIOLOGY | Age: 78
End: 2018-03-15
Attending: INTERNAL MEDICINE
Payer: MEDICARE

## 2018-03-15 VITALS
RESPIRATION RATE: 27 BRPM | HEIGHT: 63 IN | WEIGHT: 177 LBS | BODY MASS INDEX: 31.36 KG/M2 | OXYGEN SATURATION: 92 % | SYSTOLIC BLOOD PRESSURE: 150 MMHG | TEMPERATURE: 97.5 F | DIASTOLIC BLOOD PRESSURE: 65 MMHG | HEART RATE: 64 BPM

## 2018-03-15 PROCEDURE — 74330 X-RAY BILE/PANC ENDOSCOPY: CPT

## 2018-03-15 PROCEDURE — 74011250636 HC RX REV CODE- 250/636: Performed by: ANESTHESIOLOGY

## 2018-03-15 PROCEDURE — 74011636320 HC RX REV CODE- 636/320: Performed by: INTERNAL MEDICINE

## 2018-03-15 PROCEDURE — 77030008477 HC STYL SATN SLP COVD -A: Performed by: ANESTHESIOLOGY

## 2018-03-15 PROCEDURE — 77030008703 HC TU ET UNCUF COVD -A: Performed by: ANESTHESIOLOGY

## 2018-03-15 PROCEDURE — 74011250636 HC RX REV CODE- 250/636

## 2018-03-15 PROCEDURE — 77030007288 HC DEV LOK BILI BSC -A: Performed by: INTERNAL MEDICINE

## 2018-03-15 PROCEDURE — 76060000033 HC ANESTHESIA 1 TO 1.5 HR: Performed by: INTERNAL MEDICINE

## 2018-03-15 PROCEDURE — 76040000026: Performed by: INTERNAL MEDICINE

## 2018-03-15 PROCEDURE — 74011250636 HC RX REV CODE- 250/636: Performed by: INTERNAL MEDICINE

## 2018-03-15 PROCEDURE — 77030011640 HC PAD GRND REM COVD -A: Performed by: INTERNAL MEDICINE

## 2018-03-15 PROCEDURE — C2625 STENT, NON-COR, TEM W/DEL SY: HCPCS | Performed by: INTERNAL MEDICINE

## 2018-03-15 PROCEDURE — 77030032490 HC SLV COMPR SCD KNE COVD -B: Performed by: INTERNAL MEDICINE

## 2018-03-15 PROCEDURE — 77030009038 HC CATH BILI STN RTVR BSC -C: Performed by: INTERNAL MEDICINE

## 2018-03-15 PROCEDURE — 74011000250 HC RX REV CODE- 250

## 2018-03-15 PROCEDURE — 77030012595 HC SPHNTOM BILI BSC -D: Performed by: INTERNAL MEDICINE

## 2018-03-15 DEVICE — BILIARY STENT WITH RX DELIVERY SYSTEM
Type: IMPLANTABLE DEVICE | Site: BILE DUCT | Status: FUNCTIONAL
Brand: RX BILIARY

## 2018-03-15 RX ORDER — ONDANSETRON 2 MG/ML
INJECTION INTRAMUSCULAR; INTRAVENOUS
Status: DISCONTINUED
Start: 2018-03-15 | End: 2018-03-15 | Stop reason: HOSPADM

## 2018-03-15 RX ORDER — SUCCINYLCHOLINE CHLORIDE 20 MG/ML
INJECTION INTRAMUSCULAR; INTRAVENOUS AS NEEDED
Status: DISCONTINUED | OUTPATIENT
Start: 2018-03-15 | End: 2018-03-15 | Stop reason: HOSPADM

## 2018-03-15 RX ORDER — DEXAMETHASONE SODIUM PHOSPHATE 4 MG/ML
INJECTION, SOLUTION INTRA-ARTICULAR; INTRALESIONAL; INTRAMUSCULAR; INTRAVENOUS; SOFT TISSUE AS NEEDED
Status: DISCONTINUED | OUTPATIENT
Start: 2018-03-15 | End: 2018-03-15 | Stop reason: HOSPADM

## 2018-03-15 RX ORDER — SODIUM CHLORIDE, SODIUM LACTATE, POTASSIUM CHLORIDE, CALCIUM CHLORIDE 600; 310; 30; 20 MG/100ML; MG/100ML; MG/100ML; MG/100ML
75 INJECTION, SOLUTION INTRAVENOUS CONTINUOUS
Status: DISCONTINUED | OUTPATIENT
Start: 2018-03-15 | End: 2018-03-15 | Stop reason: HOSPADM

## 2018-03-15 RX ORDER — ONDANSETRON 2 MG/ML
INJECTION INTRAMUSCULAR; INTRAVENOUS AS NEEDED
Status: DISCONTINUED | OUTPATIENT
Start: 2018-03-15 | End: 2018-03-15 | Stop reason: HOSPADM

## 2018-03-15 RX ORDER — PROPOFOL 10 MG/ML
INJECTION, EMULSION INTRAVENOUS AS NEEDED
Status: DISCONTINUED | OUTPATIENT
Start: 2018-03-15 | End: 2018-03-15 | Stop reason: HOSPADM

## 2018-03-15 RX ORDER — SODIUM CHLORIDE 0.9 % (FLUSH) 0.9 %
5-10 SYRINGE (ML) INJECTION AS NEEDED
Status: DISCONTINUED | OUTPATIENT
Start: 2018-03-15 | End: 2018-03-15 | Stop reason: HOSPADM

## 2018-03-15 RX ORDER — LIDOCAINE HYDROCHLORIDE 20 MG/ML
INJECTION, SOLUTION EPIDURAL; INFILTRATION; INTRACAUDAL; PERINEURAL AS NEEDED
Status: DISCONTINUED | OUTPATIENT
Start: 2018-03-15 | End: 2018-03-15 | Stop reason: HOSPADM

## 2018-03-15 RX ORDER — HYDROMORPHONE HYDROCHLORIDE 2 MG/ML
0.5 INJECTION, SOLUTION INTRAMUSCULAR; INTRAVENOUS; SUBCUTANEOUS
Status: DISCONTINUED | OUTPATIENT
Start: 2018-03-15 | End: 2018-03-15 | Stop reason: HOSPADM

## 2018-03-15 RX ORDER — EPHEDRINE SULFATE 50 MG/ML
INJECTION, SOLUTION INTRAVENOUS AS NEEDED
Status: DISCONTINUED | OUTPATIENT
Start: 2018-03-15 | End: 2018-03-15 | Stop reason: HOSPADM

## 2018-03-15 RX ORDER — ROCURONIUM BROMIDE 10 MG/ML
INJECTION, SOLUTION INTRAVENOUS AS NEEDED
Status: DISCONTINUED | OUTPATIENT
Start: 2018-03-15 | End: 2018-03-15 | Stop reason: HOSPADM

## 2018-03-15 RX ORDER — SODIUM CHLORIDE, SODIUM LACTATE, POTASSIUM CHLORIDE, CALCIUM CHLORIDE 600; 310; 30; 20 MG/100ML; MG/100ML; MG/100ML; MG/100ML
100 INJECTION, SOLUTION INTRAVENOUS CONTINUOUS
Status: DISCONTINUED | OUTPATIENT
Start: 2018-03-15 | End: 2018-03-15 | Stop reason: HOSPADM

## 2018-03-15 RX ORDER — ONDANSETRON 2 MG/ML
4 INJECTION INTRAMUSCULAR; INTRAVENOUS ONCE
Status: COMPLETED | OUTPATIENT
Start: 2018-03-15 | End: 2018-03-15

## 2018-03-15 RX ORDER — OXYCODONE AND ACETAMINOPHEN 5; 325 MG/1; MG/1
1 TABLET ORAL AS NEEDED
Status: DISCONTINUED | OUTPATIENT
Start: 2018-03-15 | End: 2018-03-15 | Stop reason: HOSPADM

## 2018-03-15 RX ORDER — NALOXONE HYDROCHLORIDE 0.4 MG/ML
0.2 INJECTION, SOLUTION INTRAMUSCULAR; INTRAVENOUS; SUBCUTANEOUS AS NEEDED
Status: DISCONTINUED | OUTPATIENT
Start: 2018-03-15 | End: 2018-03-15 | Stop reason: HOSPADM

## 2018-03-15 RX ORDER — GLYCOPYRROLATE 0.2 MG/ML
INJECTION INTRAMUSCULAR; INTRAVENOUS AS NEEDED
Status: DISCONTINUED | OUTPATIENT
Start: 2018-03-15 | End: 2018-03-15 | Stop reason: HOSPADM

## 2018-03-15 RX ADMIN — PROPOFOL 170 MG: 10 INJECTION, EMULSION INTRAVENOUS at 12:08

## 2018-03-15 RX ADMIN — EPHEDRINE SULFATE 10 MG: 50 INJECTION, SOLUTION INTRAVENOUS at 12:40

## 2018-03-15 RX ADMIN — IOPAMIDOL 15 ML: 755 INJECTION, SOLUTION INTRAVENOUS at 12:55

## 2018-03-15 RX ADMIN — DEXAMETHASONE SODIUM PHOSPHATE 4 MG: 4 INJECTION, SOLUTION INTRA-ARTICULAR; INTRALESIONAL; INTRAMUSCULAR; INTRAVENOUS; SOFT TISSUE at 12:18

## 2018-03-15 RX ADMIN — SUCCINYLCHOLINE CHLORIDE 160 MG: 20 INJECTION INTRAMUSCULAR; INTRAVENOUS at 12:08

## 2018-03-15 RX ADMIN — LIDOCAINE HYDROCHLORIDE 80 MG: 20 INJECTION, SOLUTION EPIDURAL; INFILTRATION; INTRACAUDAL; PERINEURAL at 12:08

## 2018-03-15 RX ADMIN — SODIUM CHLORIDE, SODIUM LACTATE, POTASSIUM CHLORIDE, AND CALCIUM CHLORIDE 100 ML/HR: 600; 310; 30; 20 INJECTION, SOLUTION INTRAVENOUS at 11:33

## 2018-03-15 RX ADMIN — GLYCOPYRROLATE 0.2 MG: 0.2 INJECTION INTRAMUSCULAR; INTRAVENOUS at 12:25

## 2018-03-15 RX ADMIN — SODIUM CHLORIDE, SODIUM LACTATE, POTASSIUM CHLORIDE, AND CALCIUM CHLORIDE: 600; 310; 30; 20 INJECTION, SOLUTION INTRAVENOUS at 12:05

## 2018-03-15 RX ADMIN — ROCURONIUM BROMIDE 5 MG: 10 INJECTION, SOLUTION INTRAVENOUS at 12:08

## 2018-03-15 RX ADMIN — ONDANSETRON 4 MG: 2 INJECTION INTRAMUSCULAR; INTRAVENOUS at 12:45

## 2018-03-15 RX ADMIN — ONDANSETRON 4 MG: 2 INJECTION INTRAMUSCULAR; INTRAVENOUS at 13:39

## 2018-03-15 NOTE — ANESTHESIA PREPROCEDURE EVALUATION
Anesthetic History   No history of anesthetic complications            Review of Systems / Medical History  Patient summary reviewed and pertinent labs reviewed    Pulmonary  Within defined limits                 Neuro/Psych           Pertinent negatives: No CVA (pt denies CVA)   Cardiovascular    Hypertension: well controlled          Hyperlipidemia    Exercise tolerance: >4 METS  Comments: Pt denies CP, SOB or changes in functional status outside of recent illness   GI/Hepatic/Renal     GERD: well controlled      PUD    Comments: Pancreatitis Endo/Other    Diabetes    Obesity, arthritis and cancer (H/O breast CA)    Comments: \"Borderline DM\", on no medications for DM Other Findings   Comments:   Glaucoma                       Physical Exam    Airway  Mallampati: II  TM Distance: 4 - 6 cm  Neck ROM: normal range of motion   Mouth opening: Normal     Cardiovascular    Rhythm: regular  Rate: normal         Dental    Dentition: Full upper dentures and Lower partial plate     Pulmonary  Breath sounds clear to auscultation               Abdominal  GI exam deferred       Other Findings            Anesthetic Plan    ASA: 3  Anesthesia type: general          Induction: Intravenous  Anesthetic plan and risks discussed with: Patient and Family

## 2018-03-15 NOTE — PROGRESS NOTES
TRANSFER - OUT REPORT:    Verbal report given to [place], RN on Jeimy Coelho  being transferred to PACU for routine post - op       Report consisted of patients Situation, Background, Assessment and   Recommendations(SBAR). Information from the following report(s) Procedure Summary was reviewed with the receiving nurse. Lines:   Peripheral IV 03/15/18 Left Hand (Active)   Site Assessment Clean, dry, & intact 3/15/2018 11:31 AM   Phlebitis Assessment 0 3/15/2018 11:31 AM   Infiltration Assessment 0 3/15/2018 11:31 AM   Dressing Status Clean, dry, & intact 3/15/2018 11:31 AM   Dressing Type Tape;Transparent 3/15/2018 11:31 AM   Hub Color/Line Status Pink; Infusing 3/15/2018 11:31 AM        Opportunity for questions and clarification was provided.       Patient transported with:   Registered Nurse and CRNA

## 2018-03-15 NOTE — H&P
Chief complaint/HPI and PMH:  Recurrent choledocholithiasis with RUQ pain and MRI with filling defects. Today's exam:  LUNGS:  Clear and equal.  COR:  RRR without murmurs heard. NEURO:  A and Oriented fully. I have thoroughly explained the preparation, procedure and sedation process to the patient as well as the risks and alternatives. They will sign informed consent prior to the procedure.         Lalo Spring MD

## 2018-03-15 NOTE — ANESTHESIA POSTPROCEDURE EVALUATION
Post-Anesthesia Evaluation and Assessment    Patient: Mercedes Ozuna MRN: [id number]  SSN: [id number]    YOB: 1940  Age: 69 y.o. Sex: female       Cardiovascular Function/Vital Signs  Visit Vitals    /65    Pulse 64    Temp 35.9 °C (96.7 °F)    Resp 27    Ht 5' 3\" (1.6 m)    Wt 80.3 kg (177 lb)    SpO2 92%    Breastfeeding No    BMI 31.35 kg/m2       Patient is status post general anesthesia for Procedure(s):  ENDOSCOPIC RETROGRADE CHOLANGIOPANCREATOGRAPHY (ERCP)/ 32  ENDOSCOPIC SPHINCTEROTOMY  ENDOSCOPIC STONE EXTRACTION/BALLOON SWEEP  ENDOSCOPY WITH PROSTHESIS OR STENT PLACEMENT. Nausea/Vomiting: None    Postoperative hydration reviewed and adequate. Pain:  Pain Scale 1: Numeric (0 - 10) (03/15/18 1307)  Pain Intensity 1: 0 (03/15/18 1307)   Managed    Neurological Status:   Neuro (WDL): Within Defined Limits (03/15/18 1307)   At baseline    Mental Status and Level of Consciousness: Arousable    Pulmonary Status:   O2 Device: Nasal cannula (03/15/18 1307)   Adequate oxygenation and airway patent    Complications related to anesthesia: None    Post-anesthesia assessment completed.  No concerns    Signed By: Jessi Castaneda MD     March 15, 2018

## 2018-03-15 NOTE — DISCHARGE INSTRUCTIONS
Follow up ERCP in 3 months     Endoscopic Retrograde Cholangiopancreatogram (ERCP): What to Expect at [address]  After you have an endoscopic retrograde cholangiopancreatogram (ERCP), you probably will stay at the hospital or clinic for 1 to 2 hours. This will allow the medicine to wear off. You will be able to go home after your doctor or a nurse checks to make sure you are not having any problems. If you stay in the hospital overnight, you may go home the next day. You may have a sore throat for a day or two after the procedure. This care sheet gives you a general idea about how long it will take for you to recover. But each person recovers at a different pace. Follow the steps below to get better as quickly as possible. How can you care for yourself at home? Activity  ? · Rest as much as you need to after you go home. ? · You should be able to go back to your usual activities the day after the procedure. Diet  ? · Follow your doctor's directions for eating after the procedure. ? · Drink plenty of fluids (unless your doctor tells you not to). Medicines  ? · Your doctor will tell you if and when you can restart your medicines. He or she will also give you instructions about taking any new medicines. ? · If you take blood thinners, such as warfarin (Coumadin), clopidogrel (Plavix), or aspirin, be sure to talk to your doctor. He or she will tell you if and when to start taking those medicines again. Make sure that you understand exactly what your doctor wants you to do. ? · If you have a sore throat the next day, use an over-the-counter spray to numb your throat. Be safe with medicines. Read and follow all instructions on the label. Follow-up care is a key part of your treatment and safety. Be sure to make and go to all appointments, and call your doctor if you are having problems. It's also a good idea to know your test results and keep a list of the medicines you take.   When should you call for help? Call 911 anytime you think you may need emergency care. For example, call if:  ? · You passed out (lost consciousness). ? · Your stools are maroon or very bloody. ? · You have trouble breathing. ?Call your doctor now or go to the emergency room if:  ? · You have new or worse belly pain. ? · You have pain that does not get better after you take pain medicine. ? · You have a fever. ? · You cannot pass stools or gas. ? · You are sick to your stomach or cannot hold down fluids. ? · You have blood in your stools. ? Watch closely for changes in your health, and be sure to contact your doctor if:  ? · Your throat still hurts after a day or two. ? · You do not get better as expected. Where can you learn more? Go to http://andrew-glen.info/. Enter E101 in the search box to learn more about \"Endoscopic Retrograde Cholangiopancreatogram (ERCP): What to Expect at Home. \"  Current as of: May 12, 2017  Content Version: 11.4  © 6785-0907 [employer]. Care instructions adapted under license by [employer] (which disclaims liability or warranty for this information). If you have questions about a medical condition or this instruction, always ask your healthcare professional. [address] any warranty or liability for your use of this information. After general anesthesia or intravenous sedation, for 24 hours or while taking prescription Narcotics:  · Limit your activities  · Do not drive and operate hazardous machinery  · Do not make important personal or business decisions  · Do  not drink alcoholic beverages  · If you have not urinated within 8 hours after discharge, please contact your surgeon on call. *  Please give a list of your current medications to your Primary Care Provider.   *  Please update this list whenever your medications are discontinued, doses are      changed, or new medications (including over-the-counter products) are added. *  Please carry medication information at all times in case of emergency situations. These are general instructions for a healthy lifestyle:  No smoking/ No tobacco products/ Avoid exposure to second hand smoke  Surgeon General's Warning:  Quitting smoking now greatly reduces serious risk to your health. Obesity, smoking, and sedentary lifestyle greatly increases your risk for illness  A healthy diet, regular physical exercise & weight monitoring are important for maintaining a healthy lifestyle    You may be retaining fluid if you have a history of heart failure or if you experience any of the following symptoms:  Weight gain of 3 pounds or more overnight or 5 pounds in a week, increased swelling in our hands or feet or shortness of breath while lying flat in bed. Please call your doctor as soon as you notice any of these symptoms; do not wait until your next office visit. Recognize signs and symptoms of STROKE:  F-face looks uneven  A-arms unable to move or move unevenly  S-speech slurred or non-existent  T-time-call 911 as soon as signs and symptoms begin-DO NOT go       Back to bed or wait to see if you get better-TIME IS BRAIN.

## 2018-03-15 NOTE — PROCEDURES
[address]  PROCEDURE NOTE    Adriano Dimas  MR#: [id number]  : 1940  ACCOUNT #: [de-identified]   DATE OF SERVICE: 03/15/2018    PROCEDURE PERFORMED:  Endoscopic retrograde cholangiopancreatography with extension of previous sphincterotomy, balloon stone extraction, and 5 cm 10 Greek stent. DESCRIPTION OF PROCEDURE:  After obtaining informed consent, the patient was placed in the prone position. She had previously been intubated and sedated by Anesthesia -- see separately dictated report. The endoscope was advanced orally through the esophagus, stomach and duodenum. There were no mucosal abnormalities noted. Formal examination was not attempted. The patient had a series of diverticula and redundant mucosal folds with significant peristalsis. Glucagon helped. I found the opening to the common bile duct between 2 diverticula, leaning towards the smaller of the 2 diverticula towards the left as you face the screen. Both long and short endoscope approaches were useful, but mostly a standard short endoscope was used. The 0.035 wire was advanced into the grossly distended distal intrahepatic ducts and some contrast was injected. There are multiple filling defects, but they were hard to completely discern the outline because of the size of these ducts. I started with a 15-18 mm balloon, but eventually switched to a 12-15. Even the 12 mm balloon, despite an extended papillotomy, would not easily pass through the distal duct. It was not the size of the sphincterotomy, but the distortion of the distal duct with narrowing and probable scarring that was preventing passage of the balloon. I think this is secondary to previous trauma from stones or manipulation from previous ERCPs, but started because of the diverticula. I placed a 5-10 Greek stent after removing as much stone and sludge debris as I could.   The cholangiogram was significantly cleared at the end and the patient tolerated the procedure well. DISPOSITION:  I will put her on Actigall and see her back in 3 months for ERCP.       MD Amanda Chacon / Edd Dinh  D: 03/15/2018 12:55     T: 03/15/2018 13:42  JOB #: [id number]  CC: Sha Pastrana MD

## 2018-03-15 NOTE — IP AVS SNAPSHOT
[address] [address] 
[phone] Patient: David Ramos MRN: [id number] VANNESA:[id number] About your hospitalization You were admitted on:  March 15, 2018 You last received care in the:  [facility] PACU You were discharged on:  March 15, 2018 Why you were hospitalized Your primary diagnosis was:  Not on File Follow-up Information None Your Scheduled Appointments Thursday May 03, 2018  2:30 PM EDT Follow Up with Derick Miller MD  
[facility] ([address] [place] [address] Casey Yepez [address]  
[phone] Discharge Orders None A check choco indicates which time of day the medication should be taken. My Medications ASK your doctor about these medications Instructions Each Dose to Equal  
 Morning Noon Evening Bedtime  
 amLODIPine 5 mg tablet Commonly known as:  Bosie Shield Your last dose was: Your next dose is: TAKE ONE TABLET BY MOUTH EVERY DAY  
     
   
   
   
  
 furosemide 20 mg tablet Commonly known as:  LASIX Your last dose was: Your next dose is: TAKE 0.5 TABS BY MOUTH EVERY MORNING. HYDROcodone-acetaminophen 5-325 mg per tablet Commonly known as:  Patrick Silvius Your last dose was: Your next dose is: Take 1 Tab by mouth two (2) times daily as needed for Pain for up to 30 days. Max Daily Amount: 2 Tabs. 1 Tab  
    
   
   
   
  
 meloxicam 15 mg tablet Commonly known as:  MOBIC Your last dose was: Your next dose is: Take 1 Tab by mouth daily for 90 days. 15 mg  
    
   
   
   
  
 pantoprazole 40 mg tablet Commonly known as:  PROTONIX Your last dose was: Your next dose is: Take 1 Tab by mouth Before breakfast and dinner.   
 40 mg  
    
   
   
   
  
 polyethylene glycol 17 gram/dose powder Commonly known as:  Arvin Chen Your last dose was: Your next dose is: Take 17 g by mouth daily. May get the over the counter Miralax. 17 g Discharge Instructions Follow up ERCP in 3 months Endoscopic Retrograde Cholangiopancreatogram (ERCP): What to Expect at [facility] Your Recovery After you have an endoscopic retrograde cholangiopancreatogram (ERCP), you probably will stay at the hospital or clinic for 1 to 2 hours. This will allow the medicine to wear off. You will be able to go home after your doctor or a nurse checks to make sure you are not having any problems. If you stay in the hospital overnight, you may go home the next day. You may have a sore throat for a day or two after the procedure. This care sheet gives you a general idea about how long it will take for you to recover. But each person recovers at a different pace. Follow the steps below to get better as quickly as possible. How can you care for yourself at home? Activity ? · Rest as much as you need to after you go home. ? · You should be able to go back to your usual activities the day after the procedure. Diet ? · Follow your doctor's directions for eating after the procedure. ? · Drink plenty of fluids (unless your doctor tells you not to). Medicines ? · Your doctor will tell you if and when you can restart your medicines. He or she will also give you instructions about taking any new medicines. ? · If you take blood thinners, such as warfarin (Coumadin), clopidogrel (Plavix), or aspirin, be sure to talk to your doctor. He or she will tell you if and when to start taking those medicines again. Make sure that you understand exactly what your doctor wants you to do. ? · If you have a sore throat the next day, use an over-the-counter spray to numb your throat. Be safe with medicines.  Read and follow all instructions on the label. Follow-up care is a key part of your treatment and safety. Be sure to make and go to all appointments, and call your doctor if you are having problems. It's also a good idea to know your test results and keep a list of the medicines you take. When should you call for help? Call 911 anytime you think you may need emergency care. For example, call if: 
? · You passed out (lost consciousness). ? · Your stools are maroon or very bloody. ? · You have trouble breathing. ?Call your doctor now or go to the emergency room if: 
? · You have new or worse belly pain. ? · You have pain that does not get better after you take pain medicine. ? · You have a fever. ? · You cannot pass stools or gas. ? · You are sick to your stomach or cannot hold down fluids. ? · You have blood in your stools. ? Watch closely for changes in your health, and be sure to contact your doctor if: 
? · Your throat still hurts after a day or two. ? · You do not get better as expected. Where can you learn more? Go to http://andrew-glen.info/. Enter F942 in the search box to learn more about \"Endoscopic Retrograde Cholangiopancreatogram (ERCP): What to Expect at Home. \" Current as of: May 12, 2017 Content Version: 11.4 © 2855-2567 [employer]. Care instructions adapted under license by [employer] (which disclaims liability or warranty for this information). If you have questions about a medical condition or this instruction, always ask your healthcare professional. [place] [address] any warranty or liability for your use of this information. After general anesthesia or intravenous sedation, for 24 hours or while taking prescription Narcotics: · Limit your activities · Do not drive and operate hazardous machinery · Do not make important personal or business decisions · Do  not drink alcoholic beverages · If you have not urinated within 8 hours after discharge, please contact your surgeon on call. *  Please give a list of your current medications to your Primary Care Provider. *  Please update this list whenever your medications are discontinued, doses are 
    changed, or new medications (including over-the-counter products) are added. *  Please carry medication information at all times in case of emergency situations. These are general instructions for a healthy lifestyle: No smoking/ No tobacco products/ Avoid exposure to second hand smoke Surgeon General's Warning:  Quitting smoking now greatly reduces serious risk to your health. Obesity, smoking, and sedentary lifestyle greatly increases your risk for illness A healthy diet, regular physical exercise & weight monitoring are important for maintaining a healthy lifestyle You may be retaining fluid if you have a history of heart failure or if you experience any of the following symptoms:  Weight gain of 3 pounds or more overnight or 5 pounds in a week, increased swelling in our hands or feet or shortness of breath while lying flat in bed. Please call your doctor as soon as you notice any of these symptoms; do not wait until your next office visit. Recognize signs and symptoms of STROKE: 
F-face looks uneven A-arms unable to move or move unevenly S-speech slurred or non-existent T-time-call 911 as soon as signs and symptoms begin-DO NOT go Back to bed or wait to see if you get better-TIME IS BRAIN. ACO Transitions of Care Introducing [employer] [address] Dottie Sadler offers a voluntary care coordination program to provide high quality service and care to [facility] fee-for-service beneficiaries. Sloane English was designed to help you enhance your health and well-being through the following services: ? Transitions of Care  support for individuals who are transitioning from one care setting to another (example: Hospital to home). ? Chronic and Complex Care Coordination  support for individuals and caregivers of those with serious or chronic illnesses or with more than one chronic (ongoing) condition and those who take a number of different medications. If you meet specific medical criteria, a [facility] may call you directly to coordinate your care with your primary care physician and your other care providers. For questions about the [facility] programs, please, contact your physicians office. For general questions or additional information about Accountable Care Organizations: 
Please visit www.medicare.gov/acos. html or call 1-800-MEDICARE (2-861.222.4358) TTY users should call 4-381.315.2295. Introducing [facility]! [facility] introduces KitCheck patient portal. Now you can access parts of your medical record, email your doctor's office, and request medication refills online. 1. In your internet browser, go to [email or web address] 2. Click on the First Time User? Click Here link in the Sign In box. You will see the New Member Sign Up page. 3. Enter your KitCheck Access Code exactly as it appears below. You will not need to use this code after youve completed the sign-up process. If you do not sign up before the expiration date, you must request a new code. · KitCheck Access Code: [email or web address] Expires: 5/1/2018 10:53 AM 
 
4. Enter the last four digits of your Social Security Number (xxxx) and Date of Birth (mm/dd/yyyy) as indicated and click Submit. You will be taken to the next sign-up page. 5. Create a Caskt ID. This will be your KitCheck login ID and cannot be changed, so think of one that is secure and easy to remember. 6. Create a KitCheck password. You can change your password at any time. 7. Enter your Password Reset Question and Answer.  This can be used at a later time if you forget your password. 8. Enter your e-mail address. You will receive e-mail notification when new information is available in [address] 9. Click Sign Up. You can now view and download portions of your medical record. 10. Click the Download Summary menu link to download a portable copy of your medical information. If you have questions, please visit the Frequently Asked Questions section of the [employer] website. Remember, [employer] is NOT to be used for urgent needs. For medical emergencies, dial 911. Now available from your iPhone and Android! Unresulted Labs-Please follow up with your PCP about these lab tests Order Current Status XR ERCP / ERCB COMBINED In process Providers Seen During Your Hospitalization Provider Specialty Primary office phone Alaina Vaca MD Gastroenterology [phone] Your Primary Care Physician (PCP) Primary Care Physician Office Phone Office Fax Ozzie Estes [phone] You are allergic to the following Allergen Reactions Promethazine Anxiety Hydrocortisone Other (comments) GLAUCOMA Recent Documentation Height Weight Breastfeeding? BMI OB Status Smoking Status 1.6 m 80.3 kg No 31.35 kg/m2 Hysterectomy Former Smoker Emergency Contacts Name Discharge Info Relation Home Work [facility] DISCHARGE CAREGIVER [3] Child [2] [phone] JanetVielka Donnelly 48 CAREGIVER [3] Child [2] [phone] Patient Belongings The following personal items are in your possession at time of discharge: 
  Dental Appliances: Lowers, Uppers  Visual Aid: Glasses Please provide this summary of care documentation to your next provider. Signatures-by signing, you are acknowledging that this After Visit Summary has been reviewed with you and you have received a copy. Patient Signature:  ____________________________________________________________ Date:  ____________________________________________________________  
  
Nanetta Manlius Provider Signature:  ____________________________________________________________ Date:  ____________________________________________________________

## 2018-04-02 ENCOUNTER — HOSPITAL ENCOUNTER (OUTPATIENT)
Dept: PHYSICAL THERAPY | Age: 78
Discharge: HOME | End: 2018-04-02
Attending: FAMILY MEDICINE
Payer: MEDICARE

## 2018-04-02 DIAGNOSIS — G57.02 SCIATIC NEUROPATHY, LEFT: ICD-10-CM

## 2018-04-02 PROCEDURE — G8979 MOBILITY GOAL STATUS: HCPCS

## 2018-04-02 PROCEDURE — G8978 MOBILITY CURRENT STATUS: HCPCS

## 2018-04-02 PROCEDURE — 97161 PT EVAL LOW COMPLEX 20 MIN: CPT

## 2018-04-02 PROCEDURE — 97140 MANUAL THERAPY 1/> REGIONS: CPT

## 2018-04-02 NOTE — THERAPY EVALUATION
Iraida Patel  : 1940      [place] / [place] / Product Type: Medicare /  [address] [facility], [address], [facility], [address] [place] [address] Road  [phone]   [phone]       OUTPATIENT PHYSICAL THERAPY:Initial Assessment, Treatment Day:  and AM 2018    ICD-10: Treatment Diagnosis: *   Low back pain (M54.5)           Sciatica, left side (M54.32)        Lumbago with sciatica, left side (M54.42)       Precautions/Allergies:   Promethazine and Hydrocortisone   Fall Risk Score: 1 (? 5 = High Risk)  MD Orders: Eval and Treat  MEDICAL/REFERRING DIAGNOSIS:  Sciatic neuropathy, left [G57.02]   DATE OF ONSET: Chronic  REFERRING PHYSICIAN: Adalgisa Gallegos MD  RETURN PHYSICIAN APPOINTMENT: May 2018     INITIAL ASSESSMENT:  Ms. Iraida Patel has attended 1 physical therapy session including initial evaluation. Iraida Patel exhibits pain down L lateral leg. Anterior rotation R innominate corrects with MET. Decreased mobility and tenderness L/S and distal IT band that responds well with manual intervention. Comparable sign B SBing. Iraida Paetl will benefit from skilled PT (medically necessary) to address above deficits affecting participation in basic ADLs and overall functional tolerance. Manual techniques (stretching, joint mobilizations, soft tissue mobilization/myofascial release), postural exercises/education, therapeutic techniques/activities, and HEP will be performed as appropriate addressing Tim Bhatia current condition. PROBLEM LIST (Impacting functional limitations):  1. Decreased Strength  2. Decreased ADL/Functional Activities  3. Decreased Transfer Abilities  4. Decreased Ambulation Ability/Technique  5. Decreased Balance  6. Increased Pain  7. Decreased Activity Tolerance  8. Increased Fatigue  9. Increased Shortness of Breath  10. Decreased Flexibility/Joint Mobility  11.  Decreased Bromide with Home Exercise Program INTERVENTIONS PLANNED:  1. Balance Exercise  2. Bed Mobility  3. Cold  4. Cryotherapy  5. Electrical Stimulation  6. Family Education  7. Gait Training  8. Heat  9. Home Exercise Program (HEP)  10. Manual Therapy  11. Neuromuscular Re-education/Strengthening  12. Range of Motion (ROM)  13. Therapeutic Activites  14. Therapeutic Exercise/Strengthening  15. Transfer Training   TREATMENT PLAN:  Effective Dates: 4.2.18 TO 7/1/2018 (90 days). Frequency/Duration: 2 times a week for 90 Days  GOALS: (Goals have been discussed and agreed upon with patient.)  Short Term Goals 4 weeks   1. Storm Kendall will be independent with HEP  2. Storm Kendall will participate in LE stretching program to increase flexibility  3. Storm Kendall will participate in core stabilization exercises to help with stabilization during ADLs  4. Storm Kendall will participate in LE strengthening program with weights as appropriate to help with gait and elevations  5. Storm Kendall will participate in static and dynamic balance activities to decrease the risk for falls  6. Storm Kendall will tolerate manual therapy/joint mobilizations/soft tissue to increase ROM and decrease pain  7. Storm Kendall will be able to stand 30 minutes with no L LE pain and minimal to no difficulty to cook. [place]. [address] Katina Montgomery will be able to go up and down stairs with no L LE pain and minimal to no difficulty. [address] Katina Montgomery will be able to walk >1500 feet with <=1/10 pain to L low back and LE and equal step length and no difficulty. 10.   Long Term Goals 8 weeks   1. Storm Kendall will demonstrate a 10 point improvement on the LEFS to show improvement in function  2. Storm Kendall will report 0/10 pain at rest and during ADLs  3. Sotrm Kendall will demonstrate 5/5 LE strength on manual muscle testing  4.  Storm Kendall will be able to perform SLS >5 seconds bilaterally to help with gait and improve balance  Rehabilitation Potential For Stated Goals: Good  Regarding Laverle Cherise therapy, I certify that the treatment plan above will be carried out by a therapist or under their direction. Thank you for this referral,  Surinder Herrera DPT     Referring Physician Signature: Navin Garcia MD              Date                    HISTORY:   History of Present Injury/Illness (Reason for Referral):  Nicolette Johnson [place] she got pain the outside of her L knee. Then Wednesday the pain went up her leg and down to her lateral ankle. She went to MD and was told to go to PT. The pain feels better with the heating pad. · Aggravating factors: Walking   · Relieving factors: Heating pad. Past Medical History/Comorbidities:   Ms. Angela Coffman  has a past medical history of Acute pancreatitis (09/2016); Adverse effect of anesthesia; Arthritis; Backache, unspecified (8/24/2015); Bladder stone (9/18/14); Cancer ([address] Chronic pain; CVA, old, dysphagia (8/24/2015); Degeneration of intervertebral disc, site unspecified (8/24/2015); Diabetes ([address] Dyslipidemia; GERD (gastroesophageal reflux disease); Hyperlipidemia (8/24/2015); Hypertension; Hypoglycemia; Osteoporosis (8/24/2015); Other allied disorders of spine (8/24/2015); Pain in joint, pelvic region and thigh (8/24/2015); Pain in limb (8/24/2015); Personal history of malignant neoplasm of breast (8/24/2015); Plantar fasciitis; Primary open-angle glaucoma(365.11) (8/24/2015); PUD (peptic ulcer disease); Senile nuclear cataract (8/24/2015); Spinal stenosis, lumbar region, without neurogenic claudication (8/24/2015); Ulcer ([address] and Unspecified adverse effect of anesthesia. Ms. Angela Coffman  has a past surgical history that includes hx knee arthroscopy (Left, 2010); hx cholecystectomy (1970s); hx hysterectomy (1974); hx salpingo-oophorectomy (Bilateral, 1978); hx polypectomy (1990); hx gi; hx cataract removal (Left, 6/23/14, ); hx cataract removal (Right, 7/14/14); fragment kidney stone/ eswl; hx other surgical (2009); hx oophorectomy; hx total abdominal hysterectomy (1974); hx ercp (N/A, 09/2016); pr breast surgery procedure unlisted (Left, 1976); hx breast biopsy (Right); hx breast reconstruction (1981); hx breast reconstruction (1981); hx mastectomy (Left, 1976); and hx urological.  Social History/Living Environment:        Social History     Social History    Marital status:      Spouse name: N/A    Number of children: N/A    Years of education: N/A     Occupational History    Not on file.      Social History Main Topics    Smoking status: Former Smoker     Packs/day: 1.00     Years: 30.00     Quit date: 1/1/1997    Smokeless tobacco: Never Used    Alcohol use No    Drug use: No    Sexual activity: No     Other Topics Concern     Service No    Caffeine Concern No    Hobby Hazards No    Sleep Concern No    Stress Concern No    Special Diet No    Exercise No    Seat Belt Yes     Social History Narrative     Prior Level of Function/Work/Activity:      Active Ambulatory Problems     Diagnosis Date Noted    Hypertension 08/24/2015    Hyperlipidemia 08/24/2015    Osteoporosis 08/24/2015    Pain in limb 08/24/2015    Backache, unspecified 08/24/2015    Degeneration of intervertebral disc, site unspecified 08/24/2015    Other allied disorders of spine 08/24/2015    Spinal stenosis, lumbar region, without neurogenic claudication 08/24/2015    CVA, old, dysphagia 08/24/2015    Senile nuclear cataract 08/24/2015    Primary open-angle glaucoma(365.11) 08/24/2015    Personal history of malignant neoplasm of breast 08/24/2015    Pain in joint, pelvic region and thigh 08/24/2015    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    Pelvic pain 06/02/2016    Midline cystocele 06/09/2016    Bladder stones 06/27/2016    Fever 10/31/2016    RUQ abdominal pain 10/31/2016    Dysphagia 10/31/2016    Pancreatic abscess 11/02/2016    Pneumonia due to infectious organism 11/02/2016    Cholangitis due to bile duct calculus with obstruction 11/02/2016    Infected pancreatic pseudocyst 02/08/2017    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     Resolved Ambulatory Problems     Diagnosis Date Noted    Acute respiratory failure with hypoxia and hypercapnia (HCC) 11/02/2016    S[address]is [address] 11/02/2016     Past Medical History:   Diagnosis Date    Acute pancreatitis 09/2016    Adverse effect of anesthesia     Arthritis     Backache, unspecified 8/24/2015    Bladder stone 9/18/14    Cancer ([facility]     Chronic pain     CVA, old, dysphagia 8/24/2015    Degeneration of intervertebral disc, site unspecified 8/24/2015    Diabetes ([facility]     Dyslipidemia     GERD (gastroesophageal reflux disease)     Hyperlipidemia 8/24/2015    Hypertension     Hypoglycemia     Osteoporosis 8/24/2015    Other allied disorders of spine 8/24/2015    Pain in joint, pelvic region and thigh 8/24/2015    Pain in limb 8/24/2015    Personal history of malignant neoplasm of breast 8/24/2015    Plantar fasciitis     Primary open-angle glaucoma(365.11) 8/24/2015    PUD (peptic ulcer disease)     Senile nuclear cataract 8/24/2015    Spinal stenosis, lumbar region, without neurogenic claudication 8/24/2015    Ulcer ([facility]     Unspecified adverse effect of anesthesia      Note: Patient denies any increase of symptoms with cough, sneeze or valsalva. Patient denies any saddle paresthesia or bowel/bladder deficits. Personal Factors:          Sex:  female        Age:  69 y.o. Current Medications:    Current Outpatient Prescriptions:     ursodiol (ACTIGALL) 300 mg capsule, Take 300 mg by mouth two (2) times a day., Disp: , Rfl:     amLODIPine (NORVASC) 5 mg tablet, TAKE ONE TABLET BY MOUTH EVERY DAY, Disp: 90 Tab, Rfl: 3    [START ON 4/6/2018] HYDROcodone-acetaminophen (NORCO) 5-325 mg per tablet, Take 1 Tab by mouth two (2) times daily as needed for Pain for up to 30 days.  Max Daily Amount: 2 Tabs., Disp: 60 Tab, Rfl: 0    [START ON 5/6/2018] HYDROcodone-acetaminophen (NORCO) 5-325 mg per tablet, Take 1 Tab by mouth two (2) times daily as needed for Pain for up to 30 days. Max Daily Amount: 2 Tabs., Disp: 60 Tab, Rfl: 0    HYDROcodone-acetaminophen (NORCO) 5-325 mg per tablet, Take 1 Tab by mouth two (2) times daily as needed for Pain for up to 30 days. Max Daily Amount: 2 Tabs., Disp: 60 Tab, Rfl: 0    pantoprazole (PROTONIX) 40 mg tablet, Take 1 Tab by mouth Before breakfast and dinner., Disp: 60 Tab, Rfl: 11    meloxicam (MOBIC) 15 mg tablet, Take 1 Tab by mouth daily for 90 days. , Disp: 30 Tab, Rfl: 2     Date Last Reviewed:  4/2/2018   Number of Personal Factors/Comorbidities that affect the Plan of Care: 3+: HIGH COMPLEXITY   EXAMINATION:   Observation/Orthostatic Postural Assessment:          Slouched posture in sitting. Rounded shoulders  Palpation:          L L3-5 Facet, L5 SP, Pifiromis L, L sacrum. ROM:            AROM/PROM         Joint: Eval Date: 4/2/18  Re-Assess Date:  Re-Assess Date:    Active ROM RIGHT LEFT RIGHT LEFT RIGHT LEFT   Knee Extension [facility]       Knee Flexion [facility]       Hip Flexion 90 deg 90 deg       Hip Abduction         Lumbar Flexion 100% --touches toes, no pain        Lumbar Extension 100% no pain        Lumbar Side-bending Painful-70% Painful-70%       Lumbar Rotation 100% 100% no pain. Strength:     Eval Date: 4/2/18  Re-Assess Date:  Re-Assess Date:      RIGHT LEFT RIGHT LEFT RIGHT LEFT   Knee Flexion 5/5 5/5       Knee Extension 5/5 5/5       Hip Flexion 5/5 5/5       Hip Abduction 5/5 4/5       Ankle Dorsiflexion 5/5 5/5                           Single leg stance right/left: Fair.                Deep squat: Does not perform. Ham 90/90:   RIGHT LE: 90 deg   LEFT LE: 90 deg    Special Tests:    SUSANA 4:  Negative    SLUMP TEST: Negative   Neurological Screen: t    RADIATING SYMPTOMS?: YES/NO --in [address] position, yes.     Functional Mobility:  Affecting participation in basic ADLs and functional tasks. Balance:          Fair   Body Structures Involved:  1. Bones  2. Joints  3. Muscles  4. Ligaments Body Functions Affected:  1. Sensory/Pain  2. Neuromusculoskeletal  3. Movement Related Activities and Participation Affected:  1. Mobility  2. Self Care   Number of elements that affect the Plan of Care: 4+: HIGH COMPLEXITY   CLINICAL PRESENTATION:   Presentation: Stable and uncomplicated: LOW COMPLEXITY   CLINICAL DECISION MAKING:   Outcome Measure: Tool Used: Lower Extremity Functional Scale (LEFS)  Score:  Initial: 52/80 Most Recent: X/80 (Date: -- )   Interpretation of Score: 20 questions each scored on a 5 point scale with 0 representing \"extreme difficulty or unable to perform\" and 4 representing \"no difficulty\". The lower the score, the greater the functional disability. 80/80 represents no disability. Minimal detectable change is 9 points. Score 80 79-63 62-48 47-32 31-16 15-1 0   Modifier CH CI CJ CK CL CM CN     ? Mobility - Walking and Moving Around:     - CURRENT STATUS: CJ - 20%-39% impaired, limited or restricted    - GOAL STATUS: CI - 1%-19% impaired, limited or restricted    - D/C STATUS:  ---------------To be determined---------------      Medical Necessity:   · Skilled intervention continues to be required due to above deficits affecting participation in basic ADLs and overall functional tolerance. Reason for Services/Other Comments:  · Patient continues to require skilled intervention due to  above deficits affecting participation in basic ADLs and overall functional tolerance. Use of outcome tool(s) and clinical judgement create a POC that gives a: Clear prediction of patient's progress: LOW COMPLEXITY   TREATMENT:   (In addition to Assessment/Re-Assessment sessions the following treatments were rendered)  THERAPEUTIC EXERCISE: (- minutes):  Exercises per grid below to improve mobility, strength and balance.   Required minimal visual and verbal cues to promote proper body alignment and promote proper body posture. Progressed resistance, range and complexity of movement as indicated. Date:  4/2/18 Date:   Date:     Activity/Exercise Parameters Parameters Parameters                                               [facility] Portal    MANUAL THERAPY: (25  minutes): Joint mobilization, Soft tissue mobilization and Manipulation was utilized and necessary because of the patient's restricted joint motion, painful spasm, restricted motion of soft tissue. (Used abbreviations: MET - muscle energy technique; PNF - proprioceptive neuromuscular facilitation; NMR - neuromuscular re-education; AP - anterior to posterior; PA - posterior to anterior)    STM L pirifiromis and manual stretching hamstring/piriformis. PA L sacrum Grade II 6 bouts prone. MFR L pirifiromis. STM L3-5 L low back in sitting. MODALITIES: ( minutes):               Treatment/Session Assessment:  Libby Hassan verbalized understanding of role of PT and POC. No pain after PAs. Comparable sign B SBing. · Pain/ Symptoms: Initial:   7/10. .. Currently. Going up stairs is very difficulty. Pain goes away when she sits down or lie. Post Session:  0 at rest/10---\"I don't have any leg pain anymore\" ·   Compliance with Program/Exercises: Will assess as treatment progresses. · Recommendations/Intent for next treatment session: \"Next visit will focus on advancements to more challenging activities\".     Future Appointments  Date Time Provider Surya Jessica   4/5/2018 2:30 PM Ethyl Coil, [facility]   4/10/2018 1:00 PM Ethyl Coil, [facility]   4/12/2018 4:15 PM Ethyl Coil, [facility]   4/16/2018 1:45 PM Ethyl Coil, [facility]   4/19/2018 1:45 PM Ethyl Coil, [facility]   4/23/2018 1:45 PM Trisha Ray, PT [facility]   4/27/2018 1:45 PM Trisha Ray, PT [facility]   5/3/2018 2:30 PM Elver Chaudhry MD [facility]   7/25/2018 9:45 AM Elver Chaudhry MD [facility]       Total Treatment Duration: 25 min eval, 25 minute manual  PT Patient Time In/Time Out  Time In: 6055  Time Out: 333 Celina Callahan, DPT

## 2018-04-02 NOTE — PROGRESS NOTES
Ambulatory/Rehab Services H2 Model Falls Risk Assessment    Risk Factor Pts. ·   Confusion/Disorientation/Impulsivity  []    4 ·   Symptomatic Depression  []   2 ·   Altered Elimination  []   1 ·   Dizziness/Vertigo  []   1 ·   Gender (Male)  []   1 ·   Any administered antiepileptics (anticonvulsants):  []   2 ·   Any administered benzodiazepines:  []   1 ·   Visual Impairment (specify):  []   1 ·   Portable Oxygen Use  []   1 ·   Orthostatic ? BP  []   1 ·   History of Recent Falls (within 3 mos.)  []   5     Ability to Rise from Chair (choose one) Pts. ·   Ability to rise in a single movement  []   0 ·   Pushes up, successful in one attempt  [x]   1 ·   Multiple attempts, but successful  []   3 ·   Unable to rise without assistance  []   4   Total: (5 or greater = High Risk) 1     Falls Prevention Plan:   []                Physical Limitations to Exercise (specify):   []                Mobility Assistance Device (type):   []                Exercise/Equipment Adaptation (specify):    ©2010 [facility] [address] Patent #[id number].  Federal Law prohibits the replication, distribution or use without written permission from [facility] [employer]

## 2018-04-05 ENCOUNTER — HOSPITAL ENCOUNTER (OUTPATIENT)
Dept: PHYSICAL THERAPY | Age: 78
Discharge: HOME | End: 2018-04-05
Attending: FAMILY MEDICINE
Payer: MEDICARE

## 2018-04-05 PROCEDURE — 97140 MANUAL THERAPY 1/> REGIONS: CPT

## 2018-04-05 PROCEDURE — 97110 THERAPEUTIC EXERCISES: CPT

## 2018-04-05 NOTE — PROGRESS NOTES
Essie Daniels  : 1940      [place] / Plan[place] PART [place] B / Product Type: Medicare /  [address] [facility] at [address] [place] [address]. [facility], [address], [facility], [address] [place] [address]  [phone]   [phone]       OUTPATIENT PHYSICAL THERAPY:Daily Note, Treatment Day: 2nd and AM 2018    ICD-10: Treatment Diagnosis: *   Low back pain (M54.5)           Sciatica, left side (M54.32)        Lumbago with sciatica, left side (M54.42)       Precautions/Allergies:   Promethazine and Hydrocortisone   Fall Risk Score: 1 (? 5 = High Risk)  MD Orders: Eval and Treat  MEDICAL/REFERRING DIAGNOSIS:  Sciatic neuropathy, left   DATE OF ONSET: Chronic  REFERRING PHYSICIAN: Ernesto Sweeney MD  RETURN PHYSICIAN APPOINTMENT: May 2018     INITIAL ASSESSMENT:  Ms. Essie Daniels has attended 1 physical therapy session including initial evaluation. Essie Daniels exhibits pain down L lateral leg. Anterior rotation R innominate corrects with MET. Decreased mobility and tenderness L/S and distal IT band that responds well with manual intervention. Comparable sign B SBing. Essie Daniels will benefit from skilled PT (medically necessary) to address above deficits affecting participation in basic ADLs and overall functional tolerance. Manual techniques (stretching, joint mobilizations, soft tissue mobilization/myofascial release), postural exercises/education, therapeutic techniques/activities, and HEP will be performed as appropriate addressing Giacomo Caban current condition. PROBLEM LIST (Impacting functional limitations):  1. Decreased Strength  2. Decreased ADL/Functional Activities  3. Decreased Transfer Abilities  4. Decreased Ambulation Ability/Technique  5. Decreased Balance  6. Increased Pain  7. Decreased Activity Tolerance  8. Increased Fatigue  9. Increased Shortness of Breath  10. Decreased Flexibility/Joint Mobility  11.  Decreased [place] with Home Exercise Program INTERVENTIONS PLANNED:  1. Balance Exercise  2. Bed Mobility  3. Cold  4. Cryotherapy  5. Electrical Stimulation  6. Family Education  7. Gait Training  8. Heat  9. Home Exercise Program (HEP)  10. Manual Therapy  11. Neuromuscular Re-education/Strengthening  12. Range of Motion (ROM)  13. Therapeutic Activites  14. Therapeutic Exercise/Strengthening  15. Transfer Training   TREATMENT PLAN:  Effective Dates: 4.2.18 TO 7/1/2018 (90 days). Frequency/Duration: 2 times a week for 90 Days  GOALS: (Goals have been discussed and agreed upon with patient.)  Short Term Goals 4 weeks   1. Rodney Fess will be independent with HEP  2. Rodney Fess will participate in LE stretching program to increase flexibility  3. Rodney Fess will participate in core stabilization exercises to help with stabilization during ADLs  4. Rodney Fess will participate in LE strengthening program with weights as appropriate to help with gait and elevations  5. Rodney Fess will participate in static and dynamic balance activities to decrease the risk for falls  6. Rodney Fess will tolerate manual therapy/joint mobilizations/soft tissue to increase ROM and decrease pain  7. Rodney Fess will be able to stand 30 minutes with no L LE pain and minimal to no difficulty to [facility]. [facility] [address] will be able to go up and down stairs with no L LE pain and minimal to no difficulty. [address] [place] [address] [place] will be able to walk >1500 feet with <=1/10 pain to L low back and LE and equal step length and no difficulty. 10.   Long Term Goals 8 weeks   1. Rodney Fess will demonstrate a 10 point improvement on the LEFS to show improvement in function  2. Rodney Fess will report 0/10 pain at rest and during ADLs  3. Rodney Fess will demonstrate 5/5 LE strength on manual muscle testing  4.  Rodney Fess will be able to perform SLS >5 seconds bilaterally to help with gait and improve balance  Rehabilitation Potential For Stated Goals: Good  Regarding Tim Bhatia therapy, I certify that the treatment plan above will be carried out by a therapist or under their direction. Thank you for this referral,  SARA MuseT     Referring Physician Signature: Adalgisa Gallegos MD              Date                    HISTORY:   History of Present Injury/Illness (Reason for Referral):  Iraida hunter she got pain the outside of her L knee. Then Wednesday the pain went up her leg and down to her lateral ankle. She went to MD and was told to go to PT. The pain feels better with the heating pad. · Aggravating factors: Walking   · Relieving factors: Heating pad. Past Medical History/Comorbidities:   Ms. Mary Aleman  has a past medical history of Acute pancreatitis (09/2016); Adverse effect of anesthesia; Arthritis; Backache, unspecified (8/24/2015); Bladder stone (9/18/14); Cancer ([address] Chronic pain; CVA, old, dysphagia (8/24/2015); Degeneration of intervertebral disc, site unspecified (8/24/2015); Diabetes ([address] Dyslipidemia; GERD (gastroesophageal reflux disease); Hyperlipidemia (8/24/2015); Hypertension; Hypoglycemia; Osteoporosis (8/24/2015); Other allied disorders of spine (8/24/2015); Pain in joint, pelvic region and thigh (8/24/2015); Pain in limb (8/24/2015); Personal history of malignant neoplasm of breast (8/24/2015); Plantar fasciitis; Primary open-angle glaucoma(365.11) (8/24/2015); PUD (peptic ulcer disease); Senile nuclear cataract (8/24/2015); Spinal stenosis, lumbar region, without neurogenic claudication (8/24/2015); Ulcer ([address] and Unspecified adverse effect of anesthesia. Ms. Mary Aleman  has a past surgical history that includes hx knee arthroscopy (Left, 2010); hx cholecystectomy (1970s); hx hysterectomy (1974); hx salpingo-oophorectomy (Bilateral, 1978); hx polypectomy (1990); hx gi; hx cataract removal (Left, 6/23/14, ); hx cataract removal (Right, 7/14/14); fragment kidney stone/ eswl; hx other surgical (2009); hx oophorectomy; hx total abdominal hysterectomy (1974); hx ercp (N/A, 09/2016); pr breast surgery procedure unlisted (Left, 1976); hx breast biopsy (Right); hx breast reconstruction (1981); hx breast reconstruction (1981); hx mastectomy (Left, 1976); and hx urological.  Social History/Living Environment:        Social History     Social History    Marital status:      Spouse name: N/A    Number of children: N/A    Years of education: N/A     Occupational History    Not on file.      Social History Main Topics    Smoking status: Former Smoker     Packs/day: 1.00     Years: 30.00     Quit date: 1/1/1997    Smokeless tobacco: Never Used    Alcohol use No    Drug use: No    Sexual activity: No     Other Topics Concern     Service No    Caffeine Concern No    Hobby Hazards No    Sleep Concern No    Stress Concern No    Special Diet No    Exercise No    Seat Belt Yes     Social History Narrative     Prior Level of Function/Work/Activity:      Active Ambulatory Problems     Diagnosis Date Noted    Hypertension 08/24/2015    Hyperlipidemia 08/24/2015    Osteoporosis 08/24/2015    Pain in limb 08/24/2015    Backache, unspecified 08/24/2015    Degeneration of intervertebral disc, site unspecified 08/24/2015    Other allied disorders of spine 08/24/2015    Spinal stenosis, lumbar region, without neurogenic claudication 08/24/2015    CVA, old, dysphagia 08/24/2015    Senile nuclear cataract 08/24/2015    Primary open-angle glaucoma(365.11) 08/24/2015    Personal history of malignant neoplasm of breast 08/24/2015    Pain in joint, pelvic region and thigh 08/24/2015    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    Pelvic pain 06/02/2016    Midline cystocele 06/09/2016    Bladder stones 06/27/2016    Fever 10/31/2016    RUQ abdominal pain 10/31/2016    Dysphagia 10/31/2016    Pancreatic abscess 11/02/2016    Pneumonia due to infectious organism 11/02/2016    Cholangitis due to bile duct calculus with obstruction 11/02/2016    Infected pancreatic pseudocyst 02/08/2017    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     Resolved Ambulatory Problems     Diagnosis Date Noted    Acute respiratory failure with hypoxia and hypercapnia (HCC) 11/02/2016    Sepsis ([address] 11/02/2016     Past Medical History:   Diagnosis Date    Acute pancreatitis 09/2016    Adverse effect of anesthesia     Arthritis     Backache, unspecified 8/24/2015    Bladder stone 9/18/14    Cancer ([address]     Chronic pain     CVA, old, dysphagia 8/24/2015    Degeneration of intervertebral disc, site unspecified 8/24/2015    Diabetes ([address]     Dyslipidemia     GERD (gastroesophageal reflux disease)     Hyperlipidemia 8/24/2015    Hypertension     Hypoglycemia     Osteoporosis 8/24/2015    Other allied disorders of spine 8/24/2015    Pain in joint, pelvic region and thigh 8/24/2015    Pain in limb 8/24/2015    Personal history of malignant neoplasm of breast 8/24/2015    Plantar fasciitis     Primary open-angle glaucoma(365.11) 8/24/2015    PUD (peptic ulcer disease)     Senile nuclear cataract 8/24/2015    Spinal stenosis, lumbar region, without neurogenic claudication 8/24/2015    Ulcer ([address]     Unspecified adverse effect of anesthesia      Note: Patient denies any increase of symptoms with cough, sneeze or valsalva. Patient denies any saddle paresthesia or bowel/bladder deficits. Personal Factors:          Sex:  female        Age:  69 y.o. Current Medications:    Current Outpatient Prescriptions:     ursodiol (ACTIGALL) 300 mg capsule, Take 300 mg by mouth two (2) times a day., Disp: , Rfl:     amLODIPine (NORVASC) 5 mg tablet, TAKE ONE TABLET BY MOUTH EVERY DAY, Disp: 90 Tab, Rfl: 3    [START ON 4/6/2018] HYDROcodone-acetaminophen (NORCO) 5-325 mg per tablet, Take 1 Tab by mouth two (2) times daily as needed for Pain for up to 30 days.  Max Daily Amount: 2 Tabs., Disp: 60 Tab, Rfl: 0    [START ON 5/6/2018] HYDROcodone-acetaminophen (NORCO) 5-325 mg per tablet, Take 1 Tab by mouth two (2) times daily as needed for Pain for up to 30 days. Max Daily Amount: 2 Tabs., Disp: 60 Tab, Rfl: 0    HYDROcodone-acetaminophen (NORCO) 5-325 mg per tablet, Take 1 Tab by mouth two (2) times daily as needed for Pain for up to 30 days. Max Daily Amount: 2 Tabs., Disp: 60 Tab, Rfl: 0    pantoprazole (PROTONIX) 40 mg tablet, Take 1 Tab by mouth Before breakfast and dinner., Disp: 60 Tab, Rfl: 11    meloxicam (MOBIC) 15 mg tablet, Take 1 Tab by mouth daily for 90 days. , Disp: 30 Tab, Rfl: 2     Date Last Reviewed:  4/5/2018   Number of Personal Factors/Comorbidities that affect the Plan of Care: 3+: HIGH COMPLEXITY   EXAMINATION:   Observation/Orthostatic Postural Assessment:          Slouched posture in sitting. Rounded shoulders  Palpation:          L L3-5 Facet, L5 SP, Pifiromis L, L sacrum. ROM:            AROM/PROM         Joint: Eval Date: 4/2/18  Re-Assess Date:  Re-Assess Date:    Active ROM RIGHT LEFT RIGHT LEFT RIGHT LEFT   Knee Extension [facility]       Knee Flexion [facility]       Hip Flexion 90 deg 90 deg       Hip Abduction         Lumbar Flexion 100% --touches toes, no pain        Lumbar Extension 100% no pain        Lumbar Side-bending Painful-70% Painful-70%       Lumbar Rotation 100% 100% no pain. Strength:     Eval Date: 4/2/18  Re-Assess Date:  Re-Assess Date:      RIGHT LEFT RIGHT LEFT RIGHT LEFT   Knee Flexion 5/5 5/5       Knee Extension 5/5 5/5       Hip Flexion 5/5 5/5       Hip Abduction 5/5 4/5       Ankle Dorsiflexion 5/5 5/5                           Single leg stance right/left: Fair.                Deep squat: Does not perform. Ham 90/90:   RIGHT LE: 90 deg   LEFT LE: 90 deg    Special Tests:    SUSANA 4:  Negative    SLUMP TEST: Negative   Neurological Screen: t    RADIATING SYMPTOMS?: YES/NO --in [address] [facility] position, yes.     Functional Mobility:  Affecting participation in basic ADLs and functional tasks. Balance:          Fair   CLINICAL DECISION MAKING:   Outcome Measure: Tool Used: Lower Extremity Functional Scale (LEFS)  Score:  Initial: 52/80 Most Recent: X/80 (Date: -- )   Interpretation of Score: 20 questions each scored on a 5 point scale with 0 representing \"extreme difficulty or unable to perform\" and 4 representing \"no difficulty\". The lower the score, the greater the functional disability. 80/80 represents no disability. Minimal detectable change is 9 points. Score 80 79-63 62-48 47-32 31-16 15-1 0   Modifier CH CI CJ CK CL CM CN     ? Mobility - Walking and Moving Around:     - CURRENT STATUS: CJ - 20%-39% impaired, limited or restricted    - GOAL STATUS: CI - 1%-19% impaired, limited or restricted    - D/C STATUS:  ---------------To be determined---------------      Medical Necessity:   · Skilled intervention continues to be required due to above deficits affecting participation in basic ADLs and overall functional tolerance. Reason for Services/Other Comments:  · Patient continues to require skilled intervention due to  above deficits affecting participation in basic ADLs and overall functional tolerance. TREATMENT:   (In addition to Assessment/Re-Assessment sessions the following treatments were rendered)  THERAPEUTIC EXERCISE: (15 minutes):  Exercises per grid below to improve mobility, strength and balance. Required minimal visual and verbal cues to promote proper body alignment and promote proper body posture. Progressed resistance, range and complexity of movement as indicated.      Date:  4/2/18 Date:   4/5/18   Date:     Activity/Exercise Parameters Parameters Parameters   NuStep  Level 2' 10 minutes    LTR  10\"X10    Bridge  Slight lift to get rotation of hipsx10                              MedBridge Portal    MANUAL THERAPY: (40  minutes): Joint mobilization, Soft tissue mobilization and Manipulation was utilized and necessary because of the patient's restricted joint motion, painful spasm, restricted motion of soft tissue. (Used abbreviations: MET - muscle energy technique; PNF - proprioceptive neuromuscular facilitation; NMR - neuromuscular re-education; AP - anterior to posterior; PA - posterior to anterior)    STM L pirifiromis and manual stretching hamstring/piriformis--performed at varying decreased to address neurotension. PA L sacrum Grade II 6 bouts prone. MFR L pirifiromis. STM L3-5 L low back in sitting. MODALITIES: ( minutes):               Treatment/Session Assessment:  Letty Deleon verbalized understanding of role of PT and POC. No pain after PAs. Difficulty lying on her back due to pain. Comparable sign B SBing. · Pain/ Symptoms: Initial:   6/10. .. Currently. \"I was very active after last session, and overdid it I think. \" Post Session:  0 at rest/10---\"I don't have any leg pain anymore\" ·   Compliance with Program/Exercises: Will assess as treatment progresses. · Recommendations/Intent for next treatment session: \"Next visit will focus on advancements to more challenging activities\".     Future Appointments  Date Time Provider Surya Jessica   4/10/2018 1:00 PM Brandy Posada DPT [facility]   4/12/2018 11:00 AM Brandy Posada DPT [facility]   4/16/2018 1:45 PM Brandy Posada DPT [facility]   4/19/2018 1:45 PM Brandy Posada DPT [facility]   4/23/2018 1:45 PM Monserrat Burnham PT [facility]   4/27/2018 1:45 PM Monserrat Burnham, PT [facility]   5/3/2018 2:30 PM Jeffrey Hitchcock MD [facility]   7/25/2018 9:45 AM Jeffrey Hitchcock MD [facility]       Total Treatment Duration: 55 minutes  PT Patient Time In/Time Out  Time In: 4898  Time Out: Jayson Jaquez DPT

## 2018-04-10 ENCOUNTER — HOSPITAL ENCOUNTER (OUTPATIENT)
Dept: PHYSICAL THERAPY | Age: 78
Discharge: HOME | End: 2018-04-10
Attending: FAMILY MEDICINE
Payer: MEDICARE

## 2018-04-10 PROCEDURE — 97140 MANUAL THERAPY 1/> REGIONS: CPT

## 2018-04-10 PROCEDURE — 97110 THERAPEUTIC EXERCISES: CPT

## 2018-04-10 NOTE — PROGRESS NOTES
Fred Kat  : 1940      [place] / [place] B / Product Type: Medicare /  Emma Bonilla at [address] [place] [address]. [facility], [address], [facility], [address] [place] [address]  [phone]   [phone]       OUTPATIENT PHYSICAL THERAPY:Daily Note, Treatment Day: 3rd and PM 4/10/2018    ICD-10: Treatment Diagnosis: *   Low back pain (M54.5)           Sciatica, left side (M54.32)        Lumbago with sciatica, left side (M54.42)       Precautions/Allergies:   Promethazine and Hydrocortisone   Fall Risk Score: 1 (? 5 = High Risk)  MD Orders: Eval and Treat  MEDICAL/REFERRING DIAGNOSIS:  Sciatic neuropathy, left   DATE OF ONSET: Chronic  REFERRING PHYSICIAN: Mercedes Sexton MD  RETURN PHYSICIAN APPOINTMENT: May 2018     INITIAL ASSESSMENT:  Ms. Fred Kat has attended 1 physical therapy session including initial evaluation. Fred Kat exhibits pain down L lateral leg. Anterior rotation R innominate corrects with MET. Decreased mobility and tenderness L/S and distal IT band that responds well with manual intervention. Comparable sign B SBing. Fred Kat will benefit from skilled PT (medically necessary) to address above deficits affecting participation in basic ADLs and overall functional tolerance. Manual techniques (stretching, joint mobilizations, soft tissue mobilization/myofascial release), postural exercises/education, therapeutic techniques/activities, and HEP will be performed as appropriate addressing Julene Hodgkins current condition. PROBLEM LIST (Impacting functional limitations):  1. Decreased Strength  2. Decreased ADL/Functional Activities  3. Decreased Transfer Abilities  4. Decreased Ambulation Ability/Technique  5. Decreased Balance  6. Increased Pain  7. Decreased Activity Tolerance  8. Increased Fatigue  9. Increased Shortness of Breath  10. Decreased Flexibility/Joint Mobility  11.  Decreased [place] with Home Exercise Program INTERVENTIONS PLANNED:  1. Balance Exercise  2. Bed Mobility  3. Cold  4. Cryotherapy  5. Electrical Stimulation  6. Family Education  7. Gait Training  8. Heat  9. Home Exercise Program (HEP)  10. Manual Therapy  11. Neuromuscular Re-education/Strengthening  12. Range of Motion (ROM)  13. Therapeutic Activites  14. Therapeutic Exercise/Strengthening  15. Transfer Training   TREATMENT PLAN:  Effective Dates: 4.2.18 TO 7/1/2018 (90 days). Frequency/Duration: 2 times a week for 90 Days  GOALS: (Goals have been discussed and agreed upon with patient.)  Short Term Goals 4 weeks   1. Manjit Morataya will be independent with HEP  2. Manjit Morataya will participate in LE stretching program to increase flexibility  3. Manjit Morataya will participate in core stabilization exercises to help with stabilization during ADLs  4. Manjit Morataya will participate in LE strengthening program with weights as appropriate to help with gait and elevations  5. Manjit Morataya will participate in static and dynamic balance activities to decrease the risk for falls  6. Manjit Morataya will tolerate manual therapy/joint mobilizations/soft tissue to increase ROM and decrease pain  7. Manjit Morataya will be able to stand 30 minutes with no L LE pain and minimal to no difficulty to cook. [address] Maurisio Cano will be able to go up and down stairs with no L LE pain and minimal to no difficulty. [id number] Maurisio Cano will be able to walk >1500 feet with <=1/10 pain to L low back and LE and equal step length and no difficulty. 10.   Long Term Goals 8 weeks   1. Manjit Morataya will demonstrate a 10 point improvement on the LEFS to show improvement in function  2. Manjit Morataya will report 0/10 pain at rest and during ADLs  3. Manjit Morataya will demonstrate 5/5 LE strength on manual muscle testing  4.  Manjit Morataya will be able to perform SLS >5 seconds bilaterally to help with gait and improve balance  Rehabilitation Potential For Stated Goals: Good  Regarding Yusra Hem therapy, I certify that the treatment plan above will be carried out by a therapist or under their direction. Thank you for this referral,  Antony Jeffrey DPT     Referring Physician Signature: Azalia Acevedo MD              Date                    HISTORY:   History of Present Injury/Illness (Reason for Referral):  Libby hunter she got pain the outside of her L knee. Then Wednesday the pain went up her leg and down to her lateral ankle. She went to MD and was told to go to PT. The pain feels better with the heating pad. · Aggravating factors: Walking   · Relieving factors: Heating pad. Past Medical History/Comorbidities:   Ms. Neda Ford  has a past medical history of Acute pancreatitis (09/2016); Adverse effect of anesthesia; Arthritis; Backache, unspecified (8/24/2015); Bladder stone (9/18/14); Cancer ([address] Chronic pain; CVA, old, dysphagia (8/24/2015); Degeneration of intervertebral disc, site unspecified (8/24/2015); Diabetes ([address] Dyslipidemia; GERD (gastroesophageal reflux disease); Hyperlipidemia (8/24/2015); Hypertension; Hypoglycemia; Osteoporosis (8/24/2015); Other allied disorders of spine (8/24/2015); Pain in joint, pelvic region and thigh (8/24/2015); Pain in limb (8/24/2015); Personal history of malignant neoplasm of breast (8/24/2015); Plantar fasciitis; Primary open-angle glaucoma(365.11) (8/24/2015); PUD (peptic ulcer disease); Senile nuclear cataract (8/24/2015); Spinal stenosis, lumbar region, without neurogenic claudication (8/24/2015); Ulcer ([address] and Unspecified adverse effect of anesthesia. Ms. Neda Ford  has a past surgical history that includes hx knee arthroscopy (Left, 2010); hx cholecystectomy (1970s); hx hysterectomy (1974); hx salpingo-oophorectomy (Bilateral, 1978); hx polypectomy (1990); hx gi; hx cataract removal (Left, 6/23/14, ); hx cataract removal (Right, 7/14/14); fragment kidney stone/ eswl; hx other surgical (2009); hx oophorectomy; hx total abdominal hysterectomy (1974); hx ercp (N/A, 09/2016); pr breast surgery procedure unlisted (Left, 1976); hx breast biopsy (Right); hx breast reconstruction (1981); hx breast reconstruction (1981); hx mastectomy (Left, 1976); and hx urological.  Social History/Living Environment:        Social History     Social History    Marital status:      Spouse name: N/A    Number of children: N/A    Years of education: N/A     Occupational History    Not on file.      Social History Main Topics    Smoking status: Former Smoker     Packs/day: 1.00     Years: 30.00     Quit date: 1/1/1997    Smokeless tobacco: Never Used    Alcohol use No    Drug use: No    Sexual activity: No     Other Topics Concern     Service No    Caffeine Concern No    Hobby Hazards No    Sleep Concern No    Stress Concern No    Special Diet No    Exercise No    Seat Belt Yes     Social History Narrative     Prior Level of Function/Work/Activity:      Active Ambulatory Problems     Diagnosis Date Noted    Hypertension 08/24/2015    Hyperlipidemia 08/24/2015    Osteoporosis 08/24/2015    Pain in limb 08/24/2015    Backache, unspecified 08/24/2015    Degeneration of intervertebral disc, site unspecified 08/24/2015    Other allied disorders of spine 08/24/2015    Spinal stenosis, lumbar region, without neurogenic claudication 08/24/2015    CVA, old, dysphagia 08/24/2015    Senile nuclear cataract 08/24/2015    Primary open-angle glaucoma(365.11) 08/24/2015    Personal history of malignant neoplasm of breast 08/24/2015    Pain in joint, pelvic region and thigh 08/24/2015    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    Pelvic pain 06/02/2016    Midline cystocele 06/09/2016    Bladder stones 06/27/2016    Fever 10/31/2016    RUQ abdominal pain 10/31/2016    Dysphagia 10/31/2016    Pancreatic abscess 11/02/2016    Pneumonia due to infectious organism 11/02/2016    Cholangitis due to bile duct calculus with obstruction 11/02/2016    Infected pancreatic pseudocyst 02/08/2017    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     Resolved Ambulatory Problems     Diagnosis Date Noted    Acute respiratory failure with hypoxia and hypercapnia (HCC) 11/02/2016    Sepsis ([facility] 11/02/2016     Past Medical History:   Diagnosis Date    Acute pancreatitis 09/2016    Adverse effect of anesthesia     Arthritis     Backache, unspecified 8/24/2015    Bladder stone 9/18/14    Cancer ([facility]     Chronic pain     CVA, old, dysphagia 8/24/2015    Degeneration of intervertebral disc, site unspecified 8/24/2015    Diabetes ([facility]     Dyslipidemia     GERD (gastroesophageal reflux disease)     Hyperlipidemia 8/24/2015    Hypertension     Hypoglycemia     Osteoporosis 8/24/2015    Other allied disorders of spine 8/24/2015    Pain in joint, pelvic region and thigh 8/24/2015    Pain in limb 8/24/2015    Personal history of malignant neoplasm of breast 8/24/2015    Plantar fasciitis     Primary open-angle glaucoma(365.11) 8/24/2015    PUD (peptic ulcer disease)     Senile nuclear cataract 8/24/2015    Spinal stenosis, lumbar region, without neurogenic claudication 8/24/2015    Ulcer ([facility]     Unspecified adverse effect of anesthesia      Note: Patient denies any increase of symptoms with cough, sneeze or valsalva. Patient denies any saddle paresthesia or bowel/bladder deficits. Personal Factors:          Sex:  female        Age:  69 y.o. Current Medications:    Current Outpatient Prescriptions:     ursodiol (ACTIGALL) 300 mg capsule, Take 300 mg by mouth two (2) times a day., Disp: , Rfl:     amLODIPine (NORVASC) 5 mg tablet, TAKE ONE TABLET BY MOUTH EVERY DAY, Disp: 90 Tab, Rfl: 3    HYDROcodone-acetaminophen (NORCO) 5-325 mg per tablet, Take 1 Tab by mouth two (2) times daily as needed for Pain for up to 30 days.  Max Daily Amount: 2 Tabs., Disp: 60 Tab, Rfl: 0    [START ON 5/6/2018] HYDROcodone-acetaminophen (NORCO) 5-325 mg per tablet, Take 1 Tab by mouth two (2) times daily as needed for Pain for up to 30 days. Max Daily Amount: 2 Tabs., Disp: 60 Tab, Rfl: 0    pantoprazole (PROTONIX) 40 mg tablet, Take 1 Tab by mouth Before breakfast and dinner., Disp: 60 Tab, Rfl: 11    meloxicam (MOBIC) 15 mg tablet, Take 1 Tab by mouth daily for 90 days. , Disp: 30 Tab, Rfl: 2     Date Last Reviewed:  4/10/2018   Number of Personal Factors/Comorbidities that affect the Plan of Care: 3+: HIGH COMPLEXITY   EXAMINATION:   Observation/Orthostatic Postural Assessment:          Slouched posture in sitting. Rounded shoulders  Palpation:          L L3-5 Facet, L5 SP, Pifiromis L, L sacrum. ROM:            AROM/PROM         Joint: Eval Date: 4/2/18  Re-Assess Date:  Re-Assess Date:    Active ROM RIGHT LEFT RIGHT LEFT RIGHT LEFT   Knee Extension [facility]       Knee Flexion [facility]       Hip Flexion 90 deg 90 deg       Hip Abduction         Lumbar Flexion 100% --touches toes, no pain        Lumbar Extension 100% no pain        Lumbar Side-bending Painful-70% Painful-70%       Lumbar Rotation 100% 100% no pain. Strength:     Eval Date: 4/2/18  Re-Assess Date:  Re-Assess Date:      RIGHT LEFT RIGHT LEFT RIGHT LEFT   Knee Flexion 5/5 5/5       Knee Extension 5/5 5/5       Hip Flexion 5/5 5/5       Hip Abduction 5/5 4/5       Ankle Dorsiflexion 5/5 5/5                           Single leg stance right/left: Fair.                Deep squat: Does not perform. Ham 90/90:   RIGHT LE: 90 deg   LEFT LE: 90 deg    Special Tests:    SUSANA 4:  Negative    SLUMP TEST: Negative   Neurological Screen: t    RADIATING SYMPTOMS?: YES/NO --in [address] position, yes. Functional Mobility:  Affecting participation in basic ADLs and functional tasks. Balance:          Fair   CLINICAL DECISION MAKING:   Outcome Measure:    Tool Used: Lower Extremity Functional Scale (LEFS)  Score:  Initial: 52/80 Most Recent: X/80 (Date: -- )   Interpretation of Score: 20 questions each scored on a 5 point scale with 0 representing \"extreme difficulty or unable to perform\" and 4 representing \"no difficulty\". The lower the score, the greater the functional disability. 80/80 represents no disability. Minimal detectable change is 9 points. Score 80 79-63 62-48 47-32 31-16 15-1 0   Modifier CH CI CJ CK CL CM CN     ? Mobility - Walking and Moving Around:     - CURRENT STATUS: CJ - 20%-39% impaired, limited or restricted    - GOAL STATUS: CI - 1%-19% impaired, limited or restricted    - D/C STATUS:  ---------------To be determined---------------     TREATMENT:   (In addition to Assessment/Re-Assessment sessions the following treatments were rendered)  THERAPEUTIC EXERCISE: (17 minutes):  Exercises per grid below to improve mobility, strength and balance. Required minimal visual and verbal cues to promote proper body alignment and promote proper body posture. Progressed resistance, range and complexity of movement as indicated. Date:  4/2/18 Date:   4/5/18   Date:  4/10/18     Activity/Exercise Parameters Parameters Parameters   NuStep  Level 2' 10 minutes Level 2' 10 minutes   LTR  10\"X10 10\"X10   Bridge  Slight lift to get rotation of hipsx10 Slight lift to get rotation of hipsx10   Hooklying hip abduction   Green 10x                       MedBridge Portal    MANUAL THERAPY: (38  minutes): Joint mobilization, Soft tissue mobilization and Manipulation was utilized and necessary because of the patient's restricted joint motion, painful spasm, restricted motion of soft tissue. (Used abbreviations: MET - muscle energy technique; PNF - proprioceptive neuromuscular facilitation; NMR - neuromuscular re-education; AP - anterior to posterior; PA - posterior to anterior)    STM L pirifiromis and manual stretching hamstring/piriformis--performed at varying decreased to address neurotension. PA L sacrum Grade II 6 bouts prone. MFR L pirifiromis.   STM L3-5 L low back in sitting. STM lateral knee/IT band      MODALITIES: ( minutes):               Treatment/Session Assessment:  Bhavin Vasquez verbalized understanding of role of PT and POC. No pain after PAs. Difficulty lying on her back due to pain. Continued strengthening and manual as seen above. Added hooklying hip abduction to progress strength. Comparable sign B SBing. · Pain/ Symptoms: Initial:   3/10. .. Currently. \"I've been using the exercises you gave me and a massager and it's helping. \" Post Session:  0 at rest/10---\"Oh, I feel so much better! ·   Compliance with Program/Exercises: Will assess as treatment progresses. · Recommendations/Intent for next treatment session: \"Next visit will focus on advancements to more challenging activities\".     Future Appointments  Date Time Provider Surya Jessica   4/12/2018 11:00 AM Aury Mcdaniel DPT [facility]   4/16/2018 1:45 PM Aury Mcdaniel DPT [facility]   4/19/2018 1:45 PM Aury Mcdaniel DPT [facility]   4/23/2018 1:45 PM Usha Richardson PT [facility]   4/27/2018 1:45 PM Usha Richardson PT [facility]   5/3/2018 2:30 PM Aure Ascencio MD [facility]   7/25/2018 9:45 AM Aure Ascencio MD [facility]       Total Treatment Duration: 55 minutes  PT Patient Time In/Time Out  Time In: 1300  Time Out: Bahman Mendez DPT

## 2018-04-12 ENCOUNTER — HOSPITAL ENCOUNTER (OUTPATIENT)
Dept: PHYSICAL THERAPY | Age: 78
Discharge: HOME | End: 2018-04-12
Attending: FAMILY MEDICINE
Payer: MEDICARE

## 2018-04-12 PROCEDURE — 97140 MANUAL THERAPY 1/> REGIONS: CPT

## 2018-04-12 PROCEDURE — 97110 THERAPEUTIC EXERCISES: CPT

## 2018-04-12 NOTE — PROGRESS NOTES
Thaddeus Fonseca  : 1940      [place] / [place] B / Product Type: Medicare /  [address] [place]. [facility], [address], [facility], [address] [place] [address] Road  [phone]   [phone]       OUTPATIENT PHYSICAL THERAPY:Daily Note, Treatment Day: 4th and PM 2018    ICD-10: Treatment Diagnosis: *   Low back pain (M54.5)           Sciatica, left side (M54.32)        Lumbago with sciatica, left side (M54.42)       Precautions/Allergies:   Promethazine and Hydrocortisone   Fall Risk Score: 1 (? 5 = High Risk)  MD Orders: Eval and Treat  MEDICAL/REFERRING DIAGNOSIS:  Sciatic neuropathy, left   DATE OF ONSET: Chronic  REFERRING PHYSICIAN: Adolph Aleman MD  RETURN PHYSICIAN APPOINTMENT: May 2018     INITIAL ASSESSMENT:  Ms. Thaddeus Fonseca has attended 1 physical therapy session including initial evaluation. Thaddeus Fonseca exhibits pain down L lateral leg. Anterior rotation R innominate corrects with MET. Decreased mobility and tenderness L/S and distal IT band that responds well with manual intervention. Comparable sign B SBing. Thaddeus Fonseca will benefit from skilled PT (medically necessary) to address above deficits affecting participation in basic ADLs and overall functional tolerance. Manual techniques (stretching, joint mobilizations, soft tissue mobilization/myofascial release), postural exercises/education, therapeutic techniques/activities, and HEP will be performed as appropriate addressing Yasmin Sanchez current condition. PROBLEM LIST (Impacting functional limitations):  1. Decreased Strength  2. Decreased ADL/Functional Activities  3. Decreased Transfer Abilities  4. Decreased Ambulation Ability/Technique  5. Decreased Balance  6. Increased Pain  7. Decreased Activity Tolerance  8. Increased Fatigue  9. Increased Shortness of Breath  10. Decreased Flexibility/Joint Mobility  11.  Decreased [place] with Home Exercise Program INTERVENTIONS PLANNED:  1. Balance Exercise  2. Bed Mobility  3. Cold  4. Cryotherapy  5. Electrical Stimulation  6. Family Education  7. Gait Training  8. Heat  9. Home Exercise Program (HEP)  10. Manual Therapy  11. Neuromuscular Re-education/Strengthening  12. Range of Motion (ROM)  13. Therapeutic Activites  14. Therapeutic Exercise/Strengthening  15. Transfer Training   TREATMENT PLAN:  Effective Dates: 4.2.18 TO 7/1/2018 (90 days). Frequency/Duration: 2 times a week for 90 Days  GOALS: (Goals have been discussed and agreed upon with patient.)  Short Term Goals 4 weeks   1. Gracie Dick will be independent with HEP  2. Gracie Dick will participate in LE stretching program to increase flexibility  3. Gracie Dick will participate in core stabilization exercises to help with stabilization during ADLs  4. Gracie Dick will participate in LE strengthening program with weights as appropriate to help with gait and elevations  5. Gracie Dick will participate in static and dynamic balance activities to decrease the risk for falls  6. Gracie Dick will tolerate manual therapy/joint mobilizations/soft tissue to increase ROM and decrease pain  7. Gracie Dick will be able to stand 30 minutes with no L LE pain and minimal to no difficulty to cook. [place] [address] [address] Malhotra will be able to go up and down stairs with no L LE pain and minimal to no difficulty. [facility] Lobo Malhotra will be able to walk >1500 feet with <=1/10 pain to L low back and LE and equal step length and no difficulty. 10.   Long Term Goals 8 weeks   1. Gracie Dick will demonstrate a 10 point improvement on the LEFS to show improvement in function  2. Gracie Dick will report 0/10 pain at rest and during ADLs  3. Gracie Dick will demonstrate 5/5 LE strength on manual muscle testing  4.  Gracie Dick will be able to perform SLS >5 seconds bilaterally to help with gait and improve balance  Rehabilitation Potential For Stated Goals: Good  Regarding Bruceshana Manning therapy, I certify that the treatment plan above will be carried out by a therapist or under their direction. Thank you for this referral,  Domenick Bence, DPT     Referring Physician Signature: Leopold Plane, MD              Date                    HISTORY:   History of Present Injury/Illness (Reason for Referral):  Zbigniew hunter she got pain the outside of her L knee. Then Wednesday the pain went up her leg and down to her lateral ankle. She went to MD and was told to go to PT. The pain feels better with the heating pad. · Aggravating factors: Walking   · Relieving factors: Heating pad. Past Medical History/Comorbidities:   Ms. Lev Agudelo  has a past medical history of Acute pancreatitis (09/2016); Adverse effect of anesthesia; Arthritis; Backache, unspecified (8/24/2015); Bladder stone (9/18/14); Cancer ([address] Chronic pain; CVA, old, dysphagia (8/24/2015); Degeneration of intervertebral disc, site unspecified (8/24/2015); Diabetes ([address] Dyslipidemia; GERD (gastroesophageal reflux disease); Hyperlipidemia (8/24/2015); Hypertension; Hypoglycemia; Osteoporosis (8/24/2015); Other allied disorders of spine (8/24/2015); Pain in joint, pelvic region and thigh (8/24/2015); Pain in limb (8/24/2015); Personal history of malignant neoplasm of breast (8/24/2015); Plantar fasciitis; Primary open-angle glaucoma(365.11) (8/24/2015); PUD (peptic ulcer disease); Senile nuclear cataract (8/24/2015); Spinal stenosis, lumbar region, without neurogenic claudication (8/24/2015); Ulcer ([address] and Unspecified adverse effect of anesthesia. Ms. Lev Agudelo  has a past surgical history that includes hx knee arthroscopy (Left, 2010); hx cholecystectomy (1970s); hx hysterectomy (1974); hx salpingo-oophorectomy (Bilateral, 1978); hx polypectomy (1990); hx gi; hx cataract removal (Left, 6/23/14, ); hx cataract removal (Right, 7/14/14); fragment kidney stone/ eswl; hx other surgical (2009); hx oophorectomy; hx total abdominal hysterectomy (1974); hx ercp (N/A, 09/2016); pr breast surgery procedure unlisted (Left, 1976); hx breast biopsy (Right); hx breast reconstruction (1981); hx breast reconstruction (1981); hx mastectomy (Left, 1976); and hx urological.  Social History/Living Environment:        Social History     Social History    Marital status:      Spouse name: N/A    Number of children: N/A    Years of education: N/A     Occupational History    Not on file.      Social History Main Topics    Smoking status: Former Smoker     Packs/day: 1.00     Years: 30.00     Quit date: 1/1/1997    Smokeless tobacco: Never Used    Alcohol use No    Drug use: No    Sexual activity: No     Other Topics Concern     Service No    Caffeine Concern No    Hobby Hazards No    Sleep Concern No    Stress Concern No    Special Diet No    Exercise No    Seat Belt Yes     Social History Narrative     Prior Level of Function/Work/Activity:      Active Ambulatory Problems     Diagnosis Date Noted    Hypertension 08/24/2015    Hyperlipidemia 08/24/2015    Osteoporosis 08/24/2015    Pain in limb 08/24/2015    Backache, unspecified 08/24/2015    Degeneration of intervertebral disc, site unspecified 08/24/2015    Other allied disorders of spine 08/24/2015    Spinal stenosis, lumbar region, without neurogenic claudication 08/24/2015    CVA, old, dysphagia 08/24/2015    Senile nuclear cataract 08/24/2015    Primary open-angle glaucoma(365.11) 08/24/2015    Personal history of malignant neoplasm of breast 08/24/2015    Pain in joint, pelvic region and thigh 08/24/2015    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    Pelvic pain 06/02/2016    Midline cystocele 06/09/2016    Bladder stones 06/27/2016    Fever 10/31/2016    RUQ abdominal pain 10/31/2016    Dysphagia 10/31/2016    Pancreatic abscess 11/02/2016    Pneumonia due to infectious organism 11/02/2016    Cholangitis due to bile duct calculus with obstruction 11/02/2016    Infected pancreatic pseudocyst 02/08/2017    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 11/02/2016     Past Medical History:   Diagnosis Date    Acute pancreatitis 09/2016    Adverse effect of anesthesia     Arthritis     Backache, unspecified 8/24/2015    Bladder stone 9/18/14    Cancer ([facility]     Chronic pain     CVA, old, dysphagia 8/24/2015    Degeneration of intervertebral disc, site unspecified 8/24/2015    Diabetes ([facility]     Dyslipidemia     GERD (gastroesophageal reflux disease)     Hyperlipidemia 8/24/2015    Hypertension     Hypoglycemia     Osteoporosis 8/24/2015    Other allied disorders of spine 8/24/2015    Pain in joint, pelvic region and thigh 8/24/2015    Pain in limb 8/24/2015    Personal history of malignant neoplasm of breast 8/24/2015    Plantar fasciitis     Primary open-angle glaucoma(365.11) 8/24/2015    PUD (peptic ulcer disease)     Senile nuclear cataract 8/24/2015    Spinal stenosis, lumbar region, without neurogenic claudication 8/24/2015    Ulcer ([facility]     Unspecified adverse effect of anesthesia      Note: Patient denies any increase of symptoms with cough, sneeze or valsalva. Patient denies any saddle paresthesia or bowel/bladder deficits. Personal Factors:          Sex:  female        Age:  69 y.o. Current Medications:    Current Outpatient Prescriptions:     ursodiol (ACTIGALL) 300 mg capsule, Take 300 mg by mouth two (2) times a day., Disp: , Rfl:     amLODIPine (NORVASC) 5 mg tablet, TAKE ONE TABLET BY MOUTH EVERY DAY, Disp: 90 Tab, Rfl: 3    HYDROcodone-acetaminophen (NORCO) 5-325 mg per tablet, Take 1 Tab by mouth two (2) times daily as needed for Pain for up to 30 days.  Max Daily Amount: 2 Tabs., Disp: 60 Tab, Rfl: 0    [START ON 5/6/2018] HYDROcodone-acetaminophen (NORCO) 5-325 mg per tablet, Take 1 Tab by mouth two (2) times daily as needed for Pain for up to 30 days. Max Daily Amount: 2 Tabs., Disp: 60 Tab, Rfl: 0    pantoprazole (PROTONIX) 40 mg tablet, Take 1 Tab by mouth Before breakfast and dinner., Disp: 60 Tab, Rfl: 11    meloxicam (MOBIC) 15 mg tablet, Take 1 Tab by mouth daily for 90 days. , Disp: 30 Tab, Rfl: 2     Date Last Reviewed:  4/12/2018   Number of Personal Factors/Comorbidities that affect the Plan of Care: 3+: HIGH COMPLEXITY   EXAMINATION:   Observation/Orthostatic Postural Assessment:          Slouched posture in sitting. Rounded shoulders  Palpation:          L L3-5 Facet, L5 SP, Pifiromis L, L sacrum. ROM:            AROM/PROM         Joint: Eval Date: 4/2/18  Re-Assess Date:  Re-Assess Date:    Active ROM RIGHT LEFT RIGHT LEFT RIGHT LEFT   Knee Extension [facility]       Knee Flexion [facility]       Hip Flexion 90 deg 90 deg       Hip Abduction         Lumbar Flexion 100% --touches toes, no pain        Lumbar Extension 100% no pain        Lumbar Side-bending Painful-70% Painful-70%       Lumbar Rotation 100% 100% no pain. Strength:     Eval Date: 4/2/18  Re-Assess Date:  Re-Assess Date:      RIGHT LEFT RIGHT LEFT RIGHT LEFT   Knee Flexion 5/5 5/5       Knee Extension 5/5 5/5       Hip Flexion 5/5 5/5       Hip Abduction 5/5 4/5       Ankle Dorsiflexion 5/5 5/5                           Single leg stance right/left: Fair.                Deep squat: Does not perform. Ham 90/90:   RIGHT LE: 90 deg   LEFT LE: 90 deg    Special Tests:    SUSANA 4:  Negative    SLUMP TEST: Negative   Neurological Screen: t    RADIATING SYMPTOMS?: YES/NO --in [address] position, yes. Functional Mobility:  Affecting participation in basic ADLs and functional tasks. Balance:          Fair   CLINICAL DECISION MAKING:   Outcome Measure:    Tool Used: Lower Extremity Functional Scale (LEFS)  Score:  Initial: 52/80 Most Recent: X/80 (Date: -- )   Interpretation of Score: 20 questions each scored on a 5 point scale with 0 representing \"extreme difficulty or unable to perform\" and 4 representing \"no difficulty\". The lower the score, the greater the functional disability. 80/80 represents no disability. Minimal detectable change is 9 points. Score 80 79-63 62-48 47-32 31-16 15-1 0   Modifier CH CI CJ CK CL CM CN     ? Mobility - Walking and Moving Around:     - CURRENT STATUS: CJ - 20%-39% impaired, limited or restricted    - GOAL STATUS: CI - 1%-19% impaired, limited or restricted    - D/C STATUS:  ---------------To be determined---------------     TREATMENT:   (In addition to Assessment/Re-Assessment sessions the following treatments were rendered)  THERAPEUTIC EXERCISE: (15 minutes):  Exercises per grid below to improve mobility, strength and balance. Required minimal visual and verbal cues to promote proper body alignment and promote proper body posture. Progressed resistance, range and complexity of movement as indicated. Date:  4/2/18 Date:   4/5/18   Date:  4/10/18   Date:  4/12/18     Activity/Exercise Parameters Parameters Parameters    NuStep  Level 2' 10 minutes Level 2' 10 minutes Level 2' 10 minutes with MHP   LTR  10\"X10 10\"X10 10\"X10   Bridge  Slight lift to get rotation of hipsx10 Slight lift to get rotation of hipsx10    Hooklying hip abduction   Green 10x                           MedBridge Portal    MANUAL THERAPY: (25 minutes): Joint mobilization, Soft tissue mobilization and Manipulation was utilized and necessary because of the patient's restricted joint motion, painful spasm, restricted motion of soft tissue. (Used abbreviations: MET - muscle energy technique; PNF - proprioceptive neuromuscular facilitation; NMR - neuromuscular re-education; AP - anterior to posterior; PA - posterior to anterior)    STM L pirifiromis and manual stretching hamstring/piriformis--performed at varying decreased to address neurotension.   PA L sacrum Grade II 6 bouts prone. MFR L pirifiromis. STM L3-5 L low back in sitting. STM lateral knee/IT band      MODALITIES: ( minutes):         MHP to lower back with NuStep---skin assessed and minimal redness and no pain. Treatment/Session Assessment:  Nena Avendano verbalized understanding of role of PT and POC. Tender to L buttock from [facility] last time---decreased depth and amount of pressure. Able to put more weight on L knee after manual intervention. Comparable sign B SBing. · Pain/ Symptoms: Initial:   3/10. .. Currently. \" was sore to my buttock from [facility] though. But I am feeling better overall. \" Post Session:  0 at rest/10---\"Oh, I feel so much better! ·   Compliance with Program/Exercises: Will assess as treatment progresses. · Recommendations/Intent for next treatment session: \"Next visit will focus on advancements to more challenging activities\".     Future Appointments  Date Time Provider Surya Jessica   4/16/2018 1:45 PM Dari Inman DPT [facility]   4/19/2018 1:45 PM Dari Inman DPT [facility]   4/23/2018 1:45 PM Johnnie Carbajal PT [facility]   4/27/2018 1:45 PM [facility]al [facility]   5/3/2018 2:30 PM Gabrielle Muro MD [facility]   7/25/2018 9:45 AM Gabrielle Muro MD [facility]       Total Treatment Duration: 40 minutes  PT Patient Time In/Time Out  Time In: 1110  Time Out: [facility] Becca Treviño DPT

## 2018-04-16 ENCOUNTER — HOSPITAL ENCOUNTER (OUTPATIENT)
Dept: PHYSICAL THERAPY | Age: 78
Discharge: HOME | End: 2018-04-16
Attending: FAMILY MEDICINE
Payer: MEDICARE

## 2018-04-16 PROCEDURE — 97110 THERAPEUTIC EXERCISES: CPT

## 2018-04-16 PROCEDURE — 97140 MANUAL THERAPY 1/> REGIONS: CPT

## 2018-04-16 NOTE — PROGRESS NOTES
Dania Pizarro  : 1940      [place] / Plan[place] PART [place] B / Product Type: Medicare /  [address] at [place] [address]. [facility], [address], [place], [id number] [place] [address] Road  [phone]   [phone]       OUTPATIENT PHYSICAL THERAPY:Daily Note, Treatment Day: 5th and PM 2018    ICD-10: Treatment Diagnosis: *   Low back pain (M54.5)           Sciatica, left side (M54.32)        Lumbago with sciatica, left side (M54.42)       Precautions/Allergies:   Promethazine and Hydrocortisone   Fall Risk Score: 1 (? 5 = High Risk)  MD Orders: Eval and Treat  MEDICAL/REFERRING DIAGNOSIS:  Sciatic neuropathy, left   DATE OF ONSET: Chronic  REFERRING PHYSICIAN: Kin Baeza MD  RETURN PHYSICIAN APPOINTMENT: May 2018     INITIAL ASSESSMENT:  Ms. Dania Pizarro has attended 1 physical therapy session including initial evaluation. Dania Pizarro exhibits pain down L lateral leg. Anterior rotation R innominate corrects with MET. Decreased mobility and tenderness L/S and distal IT band that responds well with manual intervention. Comparable sign B SBing. Dania Pizarro will benefit from skilled PT (medically necessary) to address above deficits affecting participation in basic ADLs and overall functional tolerance. Manual techniques (stretching, joint mobilizations, soft tissue mobilization/myofascial release), postural exercises/education, therapeutic techniques/activities, and HEP will be performed as appropriate addressing Elena Raygoza current condition. PROBLEM LIST (Impacting functional limitations):  1. Decreased Strength  2. Decreased ADL/Functional Activities  3. Decreased Transfer Abilities  4. Decreased Ambulation Ability/Technique  5. Decreased Balance  6. Increased Pain  7. Decreased Activity Tolerance  8. Increased Fatigue  9. Increased Shortness of Breath  10. Decreased Flexibility/Joint Mobility  11.  Decreased Sawyer with Home Exercise Program INTERVENTIONS PLANNED:  1. Balance Exercise  2. Bed Mobility  3. Cold  4. Cryotherapy  5. Electrical Stimulation  6. Family Education  7. Gait Training  8. Heat  9. Home Exercise Program (HEP)  10. Manual Therapy  11. Neuromuscular Re-education/Strengthening  12. Range of Motion (ROM)  13. Therapeutic Activites  14. Therapeutic Exercise/Strengthening  15. Transfer Training   TREATMENT PLAN:  Effective Dates: 4.2.18 TO 7/1/2018 (90 days). Frequency/Duration: 2 times a week for 90 Days  GOALS: (Goals have been discussed and agreed upon with patient.)  Short Term Goals 4 weeks   1. Twan Zaman will be independent with HEP  2. Twan Junie will participate in LE stretching program to increase flexibility  3. Twan Junie will participate in core stabilization exercises to help with stabilization during ADLs  4. Twan Junie will participate in LE strengthening program with weights as appropriate to help with gait and elevations  5. Twan Junie will participate in static and dynamic balance activities to decrease the risk for falls  6. Twan Junie will tolerate manual therapy/joint mobilizations/soft tissue to increase ROM and decrease pain  7. Twan Juine will be able to stand 30 minutes with no L LE pain and minimal to no difficulty to cook. [address] [facility] [address] will be able to go up and down stairs with no L LE pain and minimal to no difficulty. [address] [facility] [address] will be able to walk >1500 feet with <=1/10 pain to L low back and LE and equal step length and no difficulty. 10.   Long Term Goals 8 weeks   1. Twan Zaman will demonstrate a 10 point improvement on the LEFS to show improvement in function  2. Twan Junie will report 0/10 pain at rest and during ADLs  3. Twan Junie will demonstrate 5/5 LE strength on manual muscle testing  4.  Twan Junie will be able to perform SLS >5 seconds bilaterally to help with gait and improve balance              HISTORY:   History of Present Injury/Illness (Reason for Referral):  Thaddeus Fonseca stattes she got pain the outside of her L knee. Then Wednesday the pain went up her leg and down to her lateral ankle. She went to MD and was told to go to PT. The pain feels better with the heating pad. · Aggravating factors: Walking   · Relieving factors: Heating pad. Past Medical History/Comorbidities:   Ms. Jennifer Ku  has a past medical history of Acute pancreatitis (09/2016); Adverse effect of anesthesia; Arthritis; Backache, unspecified (8/24/2015); Bladder stone (9/18/14); Cancer ([address] Chronic pain; CVA, old, dysphagia (8/24/2015); Degeneration of intervertebral disc, site unspecified (8/24/2015); Diabetes ([address] Dyslipidemia; GERD (gastroesophageal reflux disease); Hyperlipidemia (8/24/2015); Hypertension; Hypoglycemia; Osteoporosis (8/24/2015); Other allied disorders of spine (8/24/2015); Pain in joint, pelvic region and thigh (8/24/2015); Pain in limb (8/24/2015); Personal history of malignant neoplasm of breast (8/24/2015); Plantar fasciitis; Primary open-angle glaucoma(365.11) (8/24/2015); PUD (peptic ulcer disease); Senile nuclear cataract (8/24/2015); Spinal stenosis, lumbar region, without neurogenic claudication (8/24/2015); Ulcer ([address] and Unspecified adverse effect of anesthesia. Ms. Jennifer Ku  has a past surgical history that includes hx knee arthroscopy (Left, 2010); hx cholecystectomy (1970s); hx hysterectomy (1974); hx salpingo-oophorectomy (Bilateral, 1978); hx polypectomy (1990); hx gi; hx cataract removal (Left, 6/23/14, ); hx cataract removal (Right, 7/14/14); fragment kidney stone/ eswl; hx other surgical (2009); hx oophorectomy; hx total abdominal hysterectomy (1974); hx ercp (N/A, 09/2016); pr breast surgery procedure unlisted (Left, 1976); hx breast biopsy (Right); hx breast reconstruction (1981); hx breast reconstruction (1981); hx mastectomy (Left, 1976); and hx urological.  Social History/Living Environment:        Social History     Social History    Marital status:      Spouse name: N/A    Number of children: N/A    Years of education: N/A     Occupational History    Not on file.      Social History Main Topics    Smoking status: Former Smoker     Packs/day: 1.00     Years: 30.00     Quit date: 1/1/1997    Smokeless tobacco: Never Used    Alcohol use No    Drug use: No    Sexual activity: No     Other Topics Concern     Service No    Caffeine Concern No    Hobby Hazards No    Sleep Concern No    Stress Concern No    Special Diet No    Exercise No    Seat Belt Yes     Social History Narrative     Prior Level of Function/Work/Activity:      Active Ambulatory Problems     Diagnosis Date Noted    Hypertension 08/24/2015    Hyperlipidemia 08/24/2015    Osteoporosis 08/24/2015    Pain in limb 08/24/2015    Backache, unspecified 08/24/2015    Degeneration of intervertebral disc, site unspecified 08/24/2015    Other allied disorders of spine 08/24/2015    Spinal stenosis, lumbar region, without neurogenic claudication 08/24/2015    CVA, old, dysphagia 08/24/2015    Senile nuclear cataract 08/24/2015    Primary open-angle glaucoma(365.11) 08/24/2015    Personal history of malignant neoplasm of breast 08/24/2015    Pain in joint, pelvic region and thigh 08/24/2015    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    Pelvic pain 06/02/2016    Midline cystocele 06/09/2016    Bladder stones 06/27/2016    Fever 10/31/2016    RUQ abdominal pain 10/31/2016    Dysphagia 10/31/2016    Pancreatic abscess 11/02/2016    Pneumonia due to infectious organism 11/02/2016    Cholangitis due to bile duct calculus with obstruction 11/02/2016    Infected pancreatic pseudocyst 02/08/2017    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     Resolved Ambulatory Problems     Diagnosis Date Noted    Acute respiratory failure with hypoxia and hypercapnia ([address] 11/02/2016    Sepsis ([address] 11/02/2016     Past Medical History:   Diagnosis Date    Acute pancreatitis 09/2016    Adverse effect of anesthesia     Arthritis     Backache, unspecified 8/24/2015    Bladder stone 9/18/14    [facility]     Chronic pain     CVA, old, dysphagia 8/24/2015    Degeneration of intervertebral disc, site unspecified 8/24/2015    Diabetes [facility]     Dyslipidemia     GERD (gastroesophageal reflux disease)     Hyperlipidemia 8/24/2015    Hypertension     Hypoglycemia     Osteoporosis 8/24/2015    Other allied disorders of spine 8/24/2015    Pain in joint, pelvic region and thigh 8/24/2015    Pain in limb 8/24/2015    Personal history of malignant neoplasm of breast 8/24/2015    Plantar fasciitis     Primary open-angle glaucoma(365.11) 8/24/2015    PUD (peptic ulcer disease)     Senile nuclear cataract 8/24/2015    Spinal stenosis, lumbar region, without neurogenic claudication 8/24/2015    [facility]     Unspecified adverse effect of anesthesia      Note: Patient denies any increase of symptoms with cough, sneeze or valsalva. Patient denies any saddle paresthesia or bowel/bladder deficits. Personal Factors:          Sex:  female        Age:  69 y.o. Current Medications:    Current Outpatient Prescriptions:     ursodiol (ACTIGALL) 300 mg capsule, Take 300 mg by mouth two (2) times a day., Disp: , Rfl:     amLODIPine (NORVASC) 5 mg tablet, TAKE ONE TABLET BY MOUTH EVERY DAY, Disp: 90 Tab, Rfl: 3    HYDROcodone-acetaminophen (NORCO) 5-325 mg per tablet, Take 1 Tab by mouth two (2) times daily as needed for Pain for up to 30 days. Max Daily Amount: 2 Tabs., Disp: 60 Tab, Rfl: 0    [START ON 5/6/2018] HYDROcodone-acetaminophen (NORCO) 5-325 mg per tablet, Take 1 Tab by mouth two (2) times daily as needed for Pain for up to 30 days.  Max Daily Amount: 2 Tabs., Disp: 60 Tab, Rfl: 0    pantoprazole (PROTONIX) 40 mg tablet, Take 1 Tab by mouth Before breakfast and dinner., Disp: 60 Tab, Rfl: 11    meloxicam (MOBIC) 15 mg tablet, Take 1 Tab by mouth daily for 90 days. , Disp: 30 Tab, Rfl: 2     Date Last Reviewed:  4/16/2018   EXAMINATION:   Observation/Orthostatic Postural Assessment:          Slouched posture in sitting. Rounded shoulders  Palpation:          L L3-5 Facet, L5 SP, Pifiromis L, L sacrum. ROM:            AROM/PROM         Joint: Eval Date: 4/2/18  Re-Assess Date:  Re-Assess Date:    Active ROM RIGHT LEFT RIGHT LEFT RIGHT LEFT   [facility]nee [facility]       Knee Flexion [facility]       Hip Flexion 90 deg 90 deg       Hip Abduction         Lumbar Flexion 100% --touches toes, no pain        Lumbar Extension 100% no pain        Lumbar Side-bending Painful-70% Painful-70%       Lumbar Rotation 100% 100% no pain. Strength:     Eval Date: 4/2/18  Re-Assess Date:  Re-Assess Date:      RIGHT LEFT RIGHT LEFT RIGHT LEFT   Knee Flexion 5/5 5/5       Knee Extension 5/5 5/5       Hip Flexion 5/5 5/5       Hip Abduction 5/5 4/5       Ankle Dorsiflexion 5/5 5/5                           Single leg stance right/left: Fair.                Deep squat: Does not perform. Ham 90/90:   RIGHT LE: 90 deg   LEFT LE: 90 deg    Special Tests:    SUSANA 4:  Negative    SLUMP TEST: Negative   Neurological Screen: t    RADIATING SYMPTOMS?: YES/NO --in [address] position, yes. Functional Mobility:  Affecting participation in basic ADLs and functional tasks. Balance:          Fair   CLINICAL DECISION MAKING:   Outcome Measure: Tool Used: Lower Extremity Functional Scale (LEFS)  Score:  Initial: 52/80 Most Recent: X/80 (Date: -- )   Interpretation of Score: 20 questions each scored on a 5 point scale with 0 representing \"extreme difficulty or unable to perform\" and 4 representing \"no difficulty\". The lower the score, the greater the functional disability. 80/80 represents no disability. Minimal detectable change is 9 points. Score 80 79-63 62-48 47-32 31-16 15-1 0   Modifier CH CI CJ CK CL CM CN     ?  Mobility - Walking and Moving Around:     - CURRENT STATUS: CJ - 20%-39% impaired, limited or restricted    - GOAL STATUS: CI - 1%-19% impaired, limited or restricted    - D/C STATUS:  ---------------To be determined---------------     TREATMENT:   (In addition to Assessment/Re-Assessment sessions the following treatments were rendered)  THERAPEUTIC EXERCISE: (15 minutes):  Exercises per grid below to improve mobility, strength and balance. Required minimal visual and verbal cues to promote proper body alignment and promote proper body posture. Progressed resistance, range and complexity of movement as indicated. Date:  4/2/18 Date:   4/5/18   Date:  4/10/18   Date:  4/12/18   Date:  4/16/18   Activity/Exercise Parameters Parameters Parameters     NuStep  Level 2' 10 minutes Level 2' 10 minutes Level 2' 10 minutes with MHP Level 2' 10 minutes with MHP   LTR  10\"X10 10\"X10 10\"X10 10\"X10   Bridge  Slight lift to get rotation of hipsx10 Slight lift to get rotation of hipsx10     Hooklying hip abduction   Green 10x  Green 10\"x10   Hip Flex Opp                           MedBridge Portal    MANUAL THERAPY: (25 minutes): Joint mobilization, Soft tissue mobilization and Manipulation was utilized and necessary because of the patient's restricted joint motion, painful spasm, restricted motion of soft tissue. (Used abbreviations: MET - muscle energy technique; PNF - proprioceptive neuromuscular facilitation; NMR - neuromuscular re-education; AP - anterior to posterior; PA - posterior to anterior)    STM L pirifiromis and manual stretching hamstring/piriformis--performed at varying decreased to address neurotension. PA L sacrum Grade II 6 bouts SL. MFR L pirifiromis. STM L3-5 L low back in sitting. STM lateral knee/IT band      MODALITIES: ( minutes):         MHP to lower back with NuStep---skin assessed and minimal redness and no pain. Treatment/Session Assessment:  Patrecia Flight verbalized understanding of role of PT and POC.   Tender to L buttock from [facility] last time---decreased depth and amount of pressure. Able to put more weight on L knee after manual intervention. Introduced hip flexion to begin core work today. Comparable sign B SBing. · Pain/ Symptoms: Initial:   2-3/10. .. Currently. \" on my left side---but it's not near as bad as it was. . It's definitely getting better. \" Post Session:  0 at rest/10---\"Oh, I feel so much better! ·   Compliance with Program/Exercises: Will assess as treatment progresses. · Recommendations/Intent for next treatment session: \"Next visit will focus on advancements to more challenging activities\".     Future Appointments  Date Time Provider Surya Jessica   4/19/2018 1:45 PM Seth Marrufo DPT [facility]   4/23/2018 1:45 PM Danilo Weinberg, DANTE [facility]   4/27/2018 1:45 PM Danilo Weinberg, DANTE [facility]   4/30/2018 1:00 PM Seth Marrufo DPT [facility]   5/3/2018 2:30 PM Tiki Corral MD [facility]   7/25/2018 9:45 AM Tiki Corral MD [facility]       Total Treatment Duration: 40 minutes  PT Patient Time In/Time Out  Time In: 1098  Time Out: [id number] Micah Lopez DPT

## 2018-04-19 ENCOUNTER — HOSPITAL ENCOUNTER (OUTPATIENT)
Dept: PHYSICAL THERAPY | Age: 78
Discharge: HOME | End: 2018-04-19
Attending: FAMILY MEDICINE
Payer: MEDICARE

## 2018-04-19 PROCEDURE — 97140 MANUAL THERAPY 1/> REGIONS: CPT

## 2018-04-19 PROCEDURE — 97110 THERAPEUTIC EXERCISES: CPT

## 2018-04-19 NOTE — PROGRESS NOTES
Zbigniew Stout  : 1940      [place] / [place] / Product Type: Medicare /  [address] at [address] [facility], [address], [facility], [address] [place] [address] Road  [phone]   [phone]       OUTPATIENT PHYSICAL THERAPY:Daily Note, Treatment Day: 6th and PM 2018    ICD-10: Treatment Diagnosis: *   Low back pain (M54.5)           Sciatica, left side (M54.32)        Lumbago with sciatica, left side (M54.42)       Precautions/Allergies:   Promethazine and Hydrocortisone   Fall Risk Score: 1 (? 5 = High Risk)  MD Orders: Eval and Treat  MEDICAL/REFERRING DIAGNOSIS:  Sciatic neuropathy, left   DATE OF ONSET: Chronic  REFERRING PHYSICIAN: Leopold Plane, MD  RETURN PHYSICIAN APPOINTMENT: May 2018     INITIAL ASSESSMENT:  Ms. Zbigniew Stout has attended 1 physical therapy session including initial evaluation. Zbigniew Stout exhibits pain down L lateral leg. Anterior rotation R innominate corrects with MET. Decreased mobility and tenderness L/S and distal IT band that responds well with manual intervention. Comparable sign B SBing. Zbginiew Stout will benefit from skilled PT (medically necessary) to address above deficits affecting participation in basic ADLs and overall functional tolerance. Manual techniques (stretching, joint mobilizations, soft tissue mobilization/myofascial release), postural exercises/education, therapeutic techniques/activities, and HEP will be performed as appropriate addressing Flor Manning current condition. PROBLEM LIST (Impacting functional limitations):  1. Decreased Strength  2. Decreased ADL/Functional Activities  3. Decreased Transfer Abilities  4. Decreased Ambulation Ability/Technique  5. Decreased Balance  6. Increased Pain  7. Decreased Activity Tolerance  8. Increased Fatigue  9. Increased Shortness of Breath  10. Decreased Flexibility/Joint Mobility  11.  Decreased [place] with Home Exercise Program INTERVENTIONS PLANNED:  1. Balance Exercise  2. Bed Mobility  3. Cold  4. Cryotherapy  5. Electrical Stimulation  6. Family Education  7. Gait Training  8. Heat  9. Home Exercise Program (HEP)  10. Manual Therapy  11. Neuromuscular Re-education/Strengthening  12. Range of Motion (ROM)  13. Therapeutic Activites  14. Therapeutic Exercise/Strengthening  15. Transfer Training   TREATMENT PLAN:  Effective Dates: 4.2.18 TO 7/1/2018 (90 days). Frequency/Duration: 2 times a week for 90 Days  GOALS: (Goals have been discussed and agreed upon with patient.)  Short Term Goals 4 weeks   1. Destiny Mcnamara will be independent with HEP  2. Destiny Mcnamara will participate in LE stretching program to increase flexibility  3. Destiny Mcnamara will participate in core stabilization exercises to help with stabilization during ADLs  4. Destiny Mcnamara will participate in LE strengthening program with weights as appropriate to help with gait and elevations  5. Destiny Mcnamara will participate in static and dynamic balance activities to decrease the risk for falls  6. Destiny Mcnamara will tolerate manual therapy/joint mobilizations/soft tissue to increase ROM and decrease pain  7. Destiny Mcnamara will be able to stand 30 minutes with no L LE pain and minimal to no difficulty to cook. [place] [id number] [address] will be able to go up and down stairs with no L LE pain and minimal to no difficulty. [address] [facility] [address] Hyatt will be able to walk >1500 feet with <=1/10 pain to L low back and LE and equal step length and no difficulty. 10.   Long Term Goals 8 weeks   1. Destiny Mcnamara will demonstrate a 10 point improvement on the LEFS to show improvement in function  2. Destiny Mcnamara will report 0/10 pain at rest and during ADLs  3. Destiny Mcnamara will demonstrate 5/5 LE strength on manual muscle testing  4.  Destiny Mcnamara will be able to perform SLS >5 seconds bilaterally to help with gait and improve balance              HISTORY:   History of Present Injury/Illness (Reason for Referral):  Rosalinda Strong stattes she got pain the outside of her L knee. Then Wednesday the pain went up her leg and down to her lateral ankle. She went to MD and was told to go to PT. The pain feels better with the heating pad. · Aggravating factors: Walking   · Relieving factors: Heating pad. Past Medical History/Comorbidities:   Ms. Anabella Camejo  has a past medical history of Acute pancreatitis (09/2016); Adverse effect of anesthesia; Arthritis; Backache, unspecified (8/24/2015); Bladder stone (9/18/14); Cancer ([address] Chronic pain; CVA, old, dysphagia (8/24/2015); Degeneration of intervertebral disc, site unspecified (8/24/2015); Diabetes ([address] Dyslipidemia; GERD (gastroesophageal reflux disease); Hyperlipidemia (8/24/2015); Hypertension; Hypoglycemia; Osteoporosis (8/24/2015); Other allied disorders of spine (8/24/2015); Pain in joint, pelvic region and thigh (8/24/2015); Pain in limb (8/24/2015); Personal history of malignant neoplasm of breast (8/24/2015); Plantar fasciitis; Primary open-angle glaucoma(365.11) (8/24/2015); PUD (peptic ulcer disease); Senile nuclear cataract (8/24/2015); Spinal stenosis, lumbar region, without neurogenic claudication (8/24/2015); Ulcer ([address] and Unspecified adverse effect of anesthesia. Ms. Anabella Camejo  has a past surgical history that includes hx knee arthroscopy (Left, 2010); hx cholecystectomy (1970s); hx hysterectomy (1974); hx salpingo-oophorectomy (Bilateral, 1978); hx polypectomy (1990); hx gi; hx cataract removal (Left, 6/23/14, ); hx cataract removal (Right, 7/14/14); fragment kidney stone/ eswl; hx other surgical (2009); hx oophorectomy; hx total abdominal hysterectomy (1974); hx ercp (N/A, 09/2016); pr breast surgery procedure unlisted (Left, 1976); hx breast biopsy (Right); hx breast reconstruction (1981); hx breast reconstruction (1981); hx mastectomy (Left, 1976); and hx urological.  Social History/Living Environment:        Social History     Social History    Marital status:      Spouse name: N/A    Number of children: N/A    Years of education: N/A     Occupational History    Not on file.      Social History Main Topics    Smoking status: Former Smoker     Packs/day: 1.00     Years: 30.00     Quit date: 1/1/1997    Smokeless tobacco: Never Used    Alcohol use No    Drug use: No    Sexual activity: No     Other Topics Concern     Service No    Caffeine Concern No    Hobby Hazards No    Sleep Concern No    Stress Concern No    Special Diet No    Exercise No    Seat Belt Yes     Social History Narrative     Prior Level of Function/Work/Activity:      Active Ambulatory Problems     Diagnosis Date Noted    Hypertension 08/24/2015    Hyperlipidemia 08/24/2015    Osteoporosis 08/24/2015    Pain in limb 08/24/2015    Backache, unspecified 08/24/2015    Degeneration of intervertebral disc, site unspecified 08/24/2015    Other allied disorders of spine 08/24/2015    Spinal stenosis, lumbar region, without neurogenic claudication 08/24/2015    CVA, old, dysphagia 08/24/2015    Senile nuclear cataract 08/24/2015    Primary open-angle glaucoma(365.11) 08/24/2015    Personal history of malignant neoplasm of breast 08/24/2015    Pain in joint, pelvic region and thigh 08/24/2015    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    Pelvic pain 06/02/2016    Midline cystocele 06/09/2016    Bladder stones 06/27/2016    Fever 10/31/2016    RUQ abdominal pain 10/31/2016    Dysphagia 10/31/2016    Pancreatic abscess 11/02/2016    Pneumonia due to infectious organism 11/02/2016    Cholangitis due to bile duct calculus with obstruction 11/02/2016    Infected pancreatic pseudocyst 02/08/2017    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     Resolved Ambulatory Problems     Diagnosis Date Noted    Acute respiratory failure with hypoxia and hypercapnia ([address] 11/02/2016    Sepsis ([address] 11/02/2016     Past Medical History:   Diagnosis Date    Acute pancreatitis 09/2016    Adverse effect of anesthesia     Arthritis     Backache, unspecified 8/24/2015    Bladder stone 9/18/14    Cancer ([facility]     Chronic pain     CVA, old, dysphagia 8/24/2015    Degeneration of intervertebral disc, site unspecified 8/24/2015    Diabetes ([facility]     Dyslipidemia     GERD (gastroesophageal reflux disease)     Hyperlipidemia 8/24/2015    Hypertension     Hypoglycemia     Osteoporosis 8/24/2015    Other allied disorders of spine 8/24/2015    Pain in joint, pelvic region and thigh 8/24/2015    Pain in limb 8/24/2015    Personal history of malignant neoplasm of breast 8/24/2015    Plantar fasciitis     Primary open-angle glaucoma(365.11) 8/24/2015    PUD (peptic ulcer disease)     Senile nuclear cataract 8/24/2015    Spinal stenosis, lumbar region, without neurogenic claudication 8/24/2015    Ulcer ([facility]     Unspecified adverse effect of anesthesia      Note: Patient denies any increase of symptoms with cough, sneeze or valsalva. Patient denies any saddle paresthesia or bowel/bladder deficits. Personal Factors:          Sex:  female        Age:  69 y.o. Current Medications:    Current Outpatient Prescriptions:     ursodiol (ACTIGALL) 300 mg capsule, Take 300 mg by mouth two (2) times a day., Disp: , Rfl:     amLODIPine (NORVASC) 5 mg tablet, TAKE ONE TABLET BY MOUTH EVERY DAY, Disp: 90 Tab, Rfl: 3    HYDROcodone-acetaminophen (NORCO) 5-325 mg per tablet, Take 1 Tab by mouth two (2) times daily as needed for Pain for up to 30 days. Max Daily Amount: 2 Tabs., Disp: 60 Tab, Rfl: 0    [START ON 5/6/2018] HYDROcodone-acetaminophen (NORCO) 5-325 mg per tablet, Take 1 Tab by mouth two (2) times daily as needed for Pain for up to 30 days.  Max Daily Amount: 2 Tabs., Disp: 60 Tab, Rfl: 0    pantoprazole (PROTONIX) 40 mg tablet, Take 1 Tab by mouth Before breakfast and dinner., Disp: 60 Tab, Rfl: 11    meloxicam (MOBIC) 15 mg tablet, Take 1 Tab by mouth daily for 90 days. , Disp: 30 Tab, Rfl: 2     Date Last Reviewed:  4/19/2018   EXAMINATION:   Observation/Orthostatic Postural Assessment:          Slouched posture in sitting. Rounded shoulders  Palpation:          L L3-5 Facet, L5 SP, Pifiromis L, L sacrum. ROM:            AROM/PROM         Joint: Eval Date: 4/2/18  Re-Assess Date:  Re-Assess Date:    Active ROM RIGHT LEFT RIGHT LEFT RIGHT LEFT   Knee [facility]       Knee Flexion [facility]       Hip Flexion 90 deg 90 deg       Hip Abduction         Lumbar Flexion 100% --touches toes, no pain        Lumbar Extension 100% no pain        Lumbar Side-bending Painful-70% Painful-70%       Lumbar Rotation 100% 100% no pain. Strength:     Eval Date: 4/2/18  Re-Assess Date:  Re-Assess Date:      RIGHT LEFT RIGHT LEFT RIGHT LEFT   Knee Flexion 5/5 5/5       Knee Extension 5/5 5/5       Hip Flexion 5/5 5/5       Hip Abduction 5/5 4/5       Ankle Dorsiflexion 5/5 5/5                           Single leg stance right/left: Fair.                Deep squat: Does not perform. Ham 90/90:   RIGHT LE: 90 deg   LEFT LE: 90 deg    Special Tests:    SUSANA 4:  Negative    SLUMP TEST: Negative   Neurological Screen: t    RADIATING SYMPTOMS?: YES/NO --in [address] position, yes. Functional Mobility:  Affecting participation in basic ADLs and functional tasks. Balance:          Fair   CLINICAL DECISION MAKING:   Outcome Measure: Tool Used: Lower Extremity Functional Scale (LEFS)  Score:  Initial: 52/80 Most Recent: X/80 (Date: -- )   Interpretation of Score: 20 questions each scored on a 5 point scale with 0 representing \"extreme difficulty or unable to perform\" and 4 representing \"no difficulty\". The lower the score, the greater the functional disability. 80/80 represents no disability. Minimal detectable change is 9 points. Score 80 79-63 62-48 47-32 31-16 15-1 0   Modifier CH CI CJ CK CL CM CN     ?  Mobility - Walking and Moving Around:     - CURRENT STATUS: CJ - 20%-39% impaired, limited or restricted    - GOAL STATUS: CI - 1%-19% impaired, limited or restricted    - D/C STATUS:  ---------------To be determined---------------     TREATMENT:   (In addition to Assessment/Re-Assessment sessions the following treatments were rendered)  THERAPEUTIC EXERCISE: (25 minutes):  Exercises per grid below to improve mobility, strength and balance. Required minimal visual and verbal cues to promote proper body alignment and promote proper body posture. Progressed resistance, range and complexity of movement as indicated. Date:  4/2/18 Date:   4/5/18   Date:  4/10/18   Date:  4/12/18   Date:  4/16/18 Date:  4/19/18   Activity/Exercise Parameters Parameters Parameters      NuStep  Level 2' 10 minutes Level 2' 10 minutes Level 2' 10 minutes with MHP Level 2' 10 minutes with MHP Level 2' 10 minutes with MHP   LTR  10\"X10 10\"X10 10\"X10 10\"X10    Bridge  Slight lift to get rotation of hipsx10 Slight lift to get rotation of hipsx10      Hooklying hip abduction   Green 10x  Green 10\"x10    Hip Flex into hands, working on [employer] activation strategy      Hold 10 seconds (hooklying position hands pushing against knees isometric). 5x intro    Education on TrA/MF/Diaphragm/Pelvic Floor. .   Diaphragmatic Breathing      5 min teaching and contractions   HEP   Pelvic Floor Contraction      10\"x5  HEP     MedBridge Portal    MANUAL THERAPY: (15 minutes): Joint mobilization, Soft tissue mobilization and Manipulation was utilized and necessary because of the patient's restricted joint motion, painful spasm, restricted motion of soft tissue. (Used abbreviations: MET - muscle energy technique; PNF - proprioceptive neuromuscular facilitation; NMR - neuromuscular re-education; AP - anterior to posterior; PA - posterior to anterior)    PA L sacrum Grade II 6 bouts in R SL. MFR L pirifiromis.   STM L3-5 L low back in sitting/R SL.  STM lateral knee/IT band Treatment/Session Assessment:  Mindy Corona verbalized understanding of role of PT and POC. Less time spent on manual today in order to progress strength/endurance. Significant improvement from evaluation, but deficits still present with L LE knee pain with WB. Introduced [employer] today. Assess response--Great understanding of Pelvic floor---I do think this will help with onset of pain and improve condition. Comparable sign B SBing. · Pain/ Symptoms: Initial:   2-3/10. Post Session:  0 at rest/10   ·   Compliance with Program/Exercises: Will assess as treatment progresses. · Recommendations/Intent for next treatment session: \"Next visit will focus on advancements to more challenging activities\". Progress strengthening and postural exercises. Will see primary therapist again April 30 - Progress note will be administered.       Future Appointments  Date Time Provider Surya Jessica   4/23/2018 1:45 PM Monica Vigil [facility]   4/27/2018 1:45 PM [facility]   4/30/2018 1:00 PM Sandra Burton DPT [facility]   5/3/2018 2:30 PM Grace Demarco [facility]   7/25/2018 9:45 AM Grcae Demarco MD [facility]       Total Treatment Duration: 40 minutes  PT Patient Time In/Time Out  Time In: 3372  Time Out: [address] Mykel Byers DPT

## 2018-04-23 ENCOUNTER — HOSPITAL ENCOUNTER (OUTPATIENT)
Dept: PHYSICAL THERAPY | Age: 78
Discharge: HOME | End: 2018-04-23
Attending: FAMILY MEDICINE
Payer: MEDICARE

## 2018-04-24 NOTE — PROGRESS NOTES
Fred Kat  : 1940  [place]  Secondary: [place] [employer] [address] at . Danisha Farmer [address]  [address] [place] [address]. [address] [facility] [address]  [phone]   [phone]        OUTPATIENT DAILY NOTE    NAME/AGE/GENDER: Fred Kat is a 69 y.o. female. DATE: 2018    Patient cancelled for appointment today due to transportation. Will plan to follow up on next scheduled visit.     Donita Makc

## 2018-04-27 ENCOUNTER — HOSPITAL ENCOUNTER (OUTPATIENT)
Dept: PHYSICAL THERAPY | Age: 78
Discharge: HOME | End: 2018-04-27
Attending: FAMILY MEDICINE
Payer: MEDICARE

## 2018-04-27 PROCEDURE — 97140 MANUAL THERAPY 1/> REGIONS: CPT

## 2018-04-27 PROCEDURE — 97110 THERAPEUTIC EXERCISES: CPT

## 2018-04-27 NOTE — PROGRESS NOTES
Iraida Patel  : 1940      [place] / [place] B / Product Type: Medicare /  [address] at [address] [place]. [facility], [address], [facility], [address] [place] [address] Road  [phone]   [phone]       OUTPATIENT PHYSICAL THERAPY:Daily Note and Treatment Day: 2018    ICD-10: Treatment Diagnosis: *   Low back pain (M54.5)           Sciatica, left side (M54.32)        Lumbago with sciatica, left side (M54.42)       Precautions/Allergies:   Promethazine and Hydrocortisone   Fall Risk Score: 1 (? 5 = High Risk)  MD Orders: Eval and Treat  MEDICAL/REFERRING DIAGNOSIS:  Sciatic neuropathy, left   DATE OF ONSET: Chronic  REFERRING PHYSICIAN: Adalgisa Gallegos MD  RETURN PHYSICIAN APPOINTMENT: May 2018     INITIAL ASSESSMENT:  Ms. Iraida Patel has attended 1 physical therapy session including initial evaluation. Iraida Patel exhibits pain down L lateral leg. Anterior rotation R innominate corrects with MET. Decreased mobility and tenderness L/S and distal IT band that responds well with manual intervention. Comparable sign B SBing. Iraida Patel will benefit from skilled PT (medically necessary) to address above deficits affecting participation in basic ADLs and overall functional tolerance. Manual techniques (stretching, joint mobilizations, soft tissue mobilization/myofascial release), postural exercises/education, therapeutic techniques/activities, and HEP will be performed as appropriate addressing Tim Bhatia current condition. PROBLEM LIST (Impacting functional limitations):  1. Decreased Strength  2. Decreased ADL/Functional Activities  3. Decreased Transfer Abilities  4. Decreased Ambulation Ability/Technique  5. Decreased Balance  6. Increased Pain  7. Decreased Activity Tolerance  8. Increased Fatigue  9. Increased Shortness of Breath  10. Decreased Flexibility/Joint Mobility  11.  Decreased [place] with Home Exercise Program INTERVENTIONS PLANNED:  1. Balance Exercise  2. Bed Mobility  3. Cold  4. Cryotherapy  5. Electrical Stimulation  6. Family Education  7. Gait Training  8. Heat  9. Home Exercise Program (HEP)  10. Manual Therapy  11. Neuromuscular Re-education/Strengthening  12. Range of Motion (ROM)  13. Therapeutic Activites  14. Therapeutic Exercise/Strengthening  15. Transfer Training   TREATMENT PLAN:  Effective Dates: 4.2.18 TO 7/1/2018 (90 days). Frequency/Duration: 2 times a week for 90 Days  GOALS: (Goals have been discussed and agreed upon with patient.)  Short Term Goals 4 weeks   1. Debora Guajardo will be independent with HEP  2. Debora Guajardo will participate in LE stretching program to increase flexibility  3. Debora Guajardo will participate in core stabilization exercises to help with stabilization during ADLs  4. Debora Guajardo will participate in LE strengthening program with weights as appropriate to help with gait and elevations  5. Debora Guajardo will participate in static and dynamic balance activities to decrease the risk for falls  6. Debora Guajardo will tolerate manual therapy/joint mobilizations/soft tissue to increase ROM and decrease pain  7. Debora Guajardo will be able to stand 30 minutes with no L LE pain and minimal to no difficulty to cook. [place] [id number] Jose Roberto Echevarria will be able to go up and down stairs with no L LE pain and minimal to no difficulty. [id number] [facility] Jose Roberto Echevarria will be able to walk >1500 feet with <=1/10 pain to L low back and LE and equal step length and no difficulty. 10.   Long Term Goals 8 weeks   1. Debora Guajardo will demonstrate a 10 point improvement on the LEFS to show improvement in function  2. Debora Guajardo will report 0/10 pain at rest and during ADLs  3. Debora Guajardo will demonstrate 5/5 LE strength on manual muscle testing  4.  Debora Guajardo will be able to perform SLS >5 seconds bilaterally to help with gait and improve balance              HISTORY:   History of Present Injury/Illness (Reason for Referral):  Thaddeus hunter she got pain the outside of her L knee. Then Wednesday the pain went up her leg and down to her lateral ankle. She went to MD and was told to go to PT. The pain feels better with the heating pad. · Aggravating factors: Walking   · Relieving factors: Heating pad. Past Medical History/Comorbidities:   Ms. Jennifer Ku  has a past medical history of Acute pancreatitis (09/2016); Adverse effect of anesthesia; Arthritis; Backache, unspecified (8/24/2015); Bladder stone (9/18/14); Cancer ([address] Chronic pain; CVA, old, dysphagia (8/24/2015); Degeneration of intervertebral disc, site unspecified (8/24/2015); Diabetes ([address] Dyslipidemia; GERD (gastroesophageal reflux disease); Hyperlipidemia (8/24/2015); Hypertension; Hypoglycemia; Osteoporosis (8/24/2015); Other allied disorders of spine (8/24/2015); Pain in joint, pelvic region and thigh (8/24/2015); Pain in limb (8/24/2015); Personal history of malignant neoplasm of breast (8/24/2015); Plantar fasciitis; Primary open-angle glaucoma(365.11) (8/24/2015); PUD (peptic ulcer disease); Senile nuclear cataract (8/24/2015); Spinal stenosis, lumbar region, without neurogenic claudication (8/24/2015); Ulcer ([address] and Unspecified adverse effect of anesthesia. Ms. Jennifer Ku  has a past surgical history that includes hx knee arthroscopy (Left, 2010); hx cholecystectomy (1970s); hx hysterectomy (1974); hx salpingo-oophorectomy (Bilateral, 1978); hx polypectomy (1990); hx gi; hx cataract removal (Left, 6/23/14, ); hx cataract removal (Right, 7/14/14); fragment kidney stone/ eswl; hx other surgical (2009); hx oophorectomy; hx total abdominal hysterectomy (1974); hx ercp (N/A, 09/2016); pr breast surgery procedure unlisted (Left, 1976); hx breast biopsy (Right); hx breast reconstruction (1981); hx breast reconstruction (1981); hx mastectomy (Left, 1976); and hx urological.  Social History/Living Environment:        Social History Social History    Marital status:      Spouse name: N/A    Number of children: N/A    Years of education: N/A     Occupational History    Not on file.      Social History Main Topics    Smoking status: Former Smoker     Packs/day: 1.00     Years: 30.00     Quit date: 1/1/1997    Smokeless tobacco: Never Used    Alcohol use No    Drug use: No    Sexual activity: No     Other Topics Concern     Service No    Caffeine Concern No    Hobby Hazards No    Sleep Concern No    Stress Concern No    Special Diet No    Exercise No    Seat Belt Yes     Social History Narrative     Prior Level of Function/Work/Activity:      Active Ambulatory Problems     Diagnosis Date Noted    Hypertension 08/24/2015    Hyperlipidemia 08/24/2015    Osteoporosis 08/24/2015    Pain in limb 08/24/2015    Backache, unspecified 08/24/2015    Degeneration of intervertebral disc, site unspecified 08/24/2015    Other allied disorders of spine 08/24/2015    Spinal stenosis, lumbar region, without neurogenic claudication 08/24/2015    CVA, old, dysphagia 08/24/2015    Senile nuclear cataract 08/24/2015    Primary open-angle glaucoma(365.11) 08/24/2015    Personal history of malignant neoplasm of breast 08/24/2015    Pain in joint, pelvic region and thigh 08/24/2015    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    Pelvic pain 06/02/2016    Midline cystocele 06/09/2016    Bladder stones 06/27/2016    Fever 10/31/2016    RUQ abdominal pain 10/31/2016    Dysphagia 10/31/2016    Pancreatic abscess 11/02/2016    Pneumonia due to infectious organism 11/02/2016    Cholangitis due to bile duct calculus with obstruction 11/02/2016    Infected pancreatic pseudocyst 02/08/2017    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     Resolved Ambulatory Problems     Diagnosis Date Noted    Acute respiratory failure with hypoxia and hypercapnia ([address] 11/02/2016    Sepsis ([address] 11/02/2016     Past Medical History:   Diagnosis Date    Acute pancreatitis 09/2016    Adverse effect of anesthesia     Arthritis     Backache, unspecified 8/24/2015    Bladder stone 9/18/14    Cancer (HCC)     Chronic pain     CVA, old, dysphagia 8/24/2015    Degeneration of intervertebral disc, site unspecified 8/24/2015    Diabetes [address]     Dyslipidemia     GERD (gastroesophageal reflux disease)     Hyperlipidemia 8/24/2015    Hypertension     Hypoglycemia     Osteoporosis 8/24/2015    Other allied disorders of spine 8/24/2015    Pain in joint, pelvic region and thigh 8/24/2015    Pain in limb 8/24/2015    Personal history of malignant neoplasm of breast 8/24/2015    Plantar fasciitis     Primary open-angle glaucoma(365.11) 8/24/2015    PUD (peptic ulcer disease)     Senile nuclear cataract 8/24/2015    Spinal stenosis, lumbar region, without neurogenic claudication 8/24/2015    Ulcer ([facility]     Unspecified adverse effect of anesthesia      Note: Patient denies any increase of symptoms with cough, sneeze or valsalva. Patient denies any saddle paresthesia or bowel/bladder deficits. Personal Factors:          Sex:  female        Age:  69 y.o. Current Medications:    Current Outpatient Prescriptions:     ursodiol (ACTIGALL) 300 mg capsule, Take 300 mg by mouth two (2) times a day., Disp: , Rfl:     amLODIPine (NORVASC) 5 mg tablet, TAKE ONE TABLET BY MOUTH EVERY DAY, Disp: 90 Tab, Rfl: 3    HYDROcodone-acetaminophen (NORCO) 5-325 mg per tablet, Take 1 Tab by mouth two (2) times daily as needed for Pain for up to 30 days. Max Daily Amount: 2 Tabs., Disp: 60 Tab, Rfl: 0    [START ON 5/6/2018] HYDROcodone-acetaminophen (NORCO) 5-325 mg per tablet, Take 1 Tab by mouth two (2) times daily as needed for Pain for up to 30 days.  Max Daily Amount: 2 Tabs., Disp: 60 Tab, Rfl: 0    pantoprazole (PROTONIX) 40 mg tablet, Take 1 Tab by mouth Before breakfast and dinner., Disp: 60 Tab, Rfl: 11    meloxicam (MOBIC) 15 mg tablet, Take 1 Tab by mouth daily for 90 days. , Disp: 30 Tab, Rfl: 2     Date Last Reviewed:  4/27/2018   EXAMINATION:   Observation/Orthostatic Postural Assessment:          Slouched posture in sitting. Rounded shoulders  Palpation:          L L3-5 Facet, L5 SP, Pifiromis L, L sacrum. ROM:            AROM/PROM         Joint: Eval Date: 4/2/18  Re-Assess Date:  Re-Assess Date:    Active ROM RIGHT LEFT RIGHT LEFT RIGHT LEFT   [facility]nee [facility]       Knee Flexion [facility]       Hip Flexion 90 deg 90 deg       Hip Abduction         Lumbar Flexion 100% --touches toes, no pain        Lumbar Extension 100% no pain        Lumbar Side-bending Painful-70% Painful-70%       Lumbar Rotation 100% 100% no pain. Strength:     Eval Date: 4/2/18  Re-Assess Date:  Re-Assess Date:      RIGHT LEFT RIGHT LEFT RIGHT LEFT   Knee Flexion 5/5 5/5       Knee Extension 5/5 5/5       Hip Flexion 5/5 5/5       Hip Abduction 5/5 4/5       Ankle Dorsiflexion 5/5 5/5                           Single leg stance right/left: Fair.                Deep squat: Does not perform. Ham 90/90:   RIGHT LE: 90 deg   LEFT LE: 90 deg    Special Tests:    SUSANA 4:  Negative    SLUMP TEST: Negative   Neurological Screen: t    RADIATING SYMPTOMS?: YES/NO --in [address] position, yes. Functional Mobility:  Affecting participation in basic ADLs and functional tasks. Balance:          Fair   CLINICAL DECISION MAKING:   Outcome Measure: Tool Used: Lower Extremity Functional Scale (LEFS)  Score:  Initial: 52/80 Most Recent: X/80 (Date: -- )   Interpretation of Score: 20 questions each scored on a 5 point scale with 0 representing \"extreme difficulty or unable to perform\" and 4 representing \"no difficulty\". The lower the score, the greater the functional disability. 80/80 represents no disability. Minimal detectable change is 9 points. Score 80 79-63 62-48 47-32 31-16 15-1 0   Modifier CH CI CJ CK CL CM CN     ?  Mobility - Walking and Moving Around:     - CURRENT STATUS: CJ - 20%-39% impaired, limited or restricted    - GOAL STATUS: CI - 1%-19% impaired, limited or restricted    - D/C STATUS:  ---------------To be determined---------------     TREATMENT:   (In addition to Assessment/Re-Assessment sessions the following treatments were rendered)  THERAPEUTIC EXERCISE: (10 minutes):  Exercises per grid below to improve mobility, strength and balance. Required minimal visual and verbal cues to promote proper body alignment and promote proper body posture. Progressed resistance, range and complexity of movement as indicated. Date:  4/2/18 Date:   4/5/18   Date:  4/10/18   Date:  4/12/18   Date:  4/16/18 Date:  4/19/18 4/27/18   Activity/Exercise Parameters Parameters Parameters       NuStep  Level 2' 10 minutes Level 2' 10 minutes Level 2' 10 minutes with MHP Level 2' 10 minutes with MHP Level 2' 10 minutes with MHP Level 2 x 10 min with MHP    LTR  10\"X10 10\"X10 10\"X10 10\"X10     Bridge  Slight lift to get rotation of hipsx10 Slight lift to get rotation of hipsx10       Hooklying hip abduction   Green 10x  Green 10\"x10     Hip Flex into hands, working on [employer] activation strategy      Hold 10 seconds (hooklying position hands pushing against knees isometric). 5x intro    Education on TrA/MF/Diaphragm/Pelvic Floor. .    Diaphragmatic Breathing      5 min teaching and contractions   HEP    Pelvic Floor Contraction      10\"x5  HEP      MedBridge Portal    MANUAL THERAPY: (35 minutes): Joint mobilization, Soft tissue mobilization and Manipulation was utilized and necessary because of the patient's restricted joint motion, painful spasm, restricted motion of soft tissue.    (Used abbreviations: MET - muscle energy technique; PNF - proprioceptive neuromuscular facilitation; NMR - neuromuscular re-education; AP - anterior to posterior; PA - posterior to anterior)    · CPA L5 grade III x 3 bouts prone   · UPA L sacral boarder Grade III 3 bouts prone    · MFR L piriformis and gluteal prone   ·  STM IT band with roller right side lying   · PA/AP prox fib head grade IV 3 bouts each direction right side lying       Treatment/Session Assessment:  Snow Gresham arrived 15 min late for appointment, due to this, focused on manual advising pt to perform her HEP at home. presented with significant trigger points of left mid to distal ITB and left sacral boarder. Majority trigger points very tender but resolved with manual techniques. At end of session, no pain of left LE, mild soreness of left PSIS region only. · Pain/ Symptoms: Initial:   3/10 ITB L,  Pt reports consistent with HEP. Post Session:  0/10 with ambulation     ·   Compliance with Program/Exercises: Will assess as treatment progresses. · Recommendations/Intent for next treatment session: \"Next visit will focus on advancements to more challenging activities\". Progress strengthening and postural exercises.   Will see primary therapist again April 30       Future Appointments  Date Time Provider Surya Jessica   4/30/2018 1:00 PM Konstantin Sauer DPT [facility]   5/3/2018 2:30 PM Katie Neal MD [facility]   7/25/2018 9:45 AM Katie Neal MD [facility]       Total Treatment Duration: 45 minutes  PT Patient Time In/Time Out  Time In: 1400  Time Out: 347 No Bernardo Callahan, PT

## 2018-04-30 ENCOUNTER — APPOINTMENT (OUTPATIENT)
Dept: PHYSICAL THERAPY | Age: 78
End: 2018-04-30
Attending: FAMILY MEDICINE
Payer: MEDICARE

## 2018-04-30 ENCOUNTER — HOSPITAL ENCOUNTER (OUTPATIENT)
Dept: PHYSICAL THERAPY | Age: 78
Discharge: HOME | End: 2018-04-30
Attending: FAMILY MEDICINE
Payer: MEDICARE

## 2018-04-30 PROCEDURE — G8979 MOBILITY GOAL STATUS: HCPCS

## 2018-04-30 PROCEDURE — 97110 THERAPEUTIC EXERCISES: CPT

## 2018-04-30 PROCEDURE — 97140 MANUAL THERAPY 1/> REGIONS: CPT

## 2018-04-30 PROCEDURE — G8978 MOBILITY CURRENT STATUS: HCPCS

## 2018-04-30 NOTE — PROGRESS NOTES
Thaddeus Fonseca  : 1940      [place] / [place] PART [place] / Product Type: Medicare /  [id number] [facility]. [facility], [address], [facility], [address] [place] [address] Road  [phone]   [phone]       OUTPATIENT PHYSICAL THERAPY:Daily Note, Progress Report, Treatment Day: 8th and PM 2018    ICD-10: Treatment Diagnosis: *   Low back pain (M54.5)           Sciatica, left side (M54.32)        Lumbago with sciatica, left side (M54.42)       Precautions/Allergies:   Promethazine and Hydrocortisone   Fall Risk Score: 1 (? 5 = High Risk)  MD Orders: Eval and Treat  MEDICAL/REFERRING DIAGNOSIS:  Sciatic neuropathy, left   DATE OF ONSET: Chronic  REFERRING PHYSICIAN: Adolph Aleman MD  RETURN PHYSICIAN APPOINTMENT: May 2018   4/30/18:  Thaddeus Fonseca has attended 8 visits including initial visits--she has no pain down leg at this time. Additionally, no pain with negotiating stairs. She continues to have back pain of which she believes \"will never go away. \"  Most goals met as seen below. Scheduled for another session after visit with MD on 5/3/18. Will continue to address posture and mm tightness---significant improvement since initial evaluation. Strength and ROM goals met for STG. INITIAL ASSESSMENT:  Ms. Thaddeus Fonseca has attended 1 physical therapy session including initial evaluation. Thaddeus Fonseca exhibits pain down L lateral leg. Anterior rotation R innominate corrects with MET. Decreased mobility and tenderness L/S and distal IT band that responds well with manual intervention. Comparable sign B SBing. Thaddeus Fonseca will benefit from skilled PT (medically necessary) to address above deficits affecting participation in basic ADLs and overall functional tolerance.   Manual techniques (stretching, joint mobilizations, soft tissue mobilization/myofascial release), postural exercises/education, therapeutic techniques/activities, and HEP will be performed as appropriate addressing Gibson Roy current condition. PROBLEM LIST (Impacting functional limitations):  1. Decreased Strength  2. Decreased ADL/Functional Activities  3. Decreased Transfer Abilities  4. Decreased Ambulation Ability/Technique  5. Decreased Balance  6. Increased Pain  7. Decreased Activity Tolerance  8. Increased Fatigue  9. Increased Shortness of Breath  10. Decreased Flexibility/Joint Mobility  11. Decreased [place] with Home Exercise Program INTERVENTIONS PLANNED:  1. Balance Exercise  2. Bed Mobility  3. Cold  4. Cryotherapy  5. Electrical Stimulation  6. Family Education  7. Gait Training  8. Heat  9. Home Exercise Program (HEP)  10. Manual Therapy  11. Neuromuscular Re-education/Strengthening  12. Range of Motion (ROM)  13. Therapeutic Activites  14. Therapeutic Exercise/Strengthening  15. Transfer Training   TREATMENT PLAN:  Effective Dates: 4.2.18 TO 7/1/2018 (90 days). Frequency/Duration: 2 times a week for 90 Days  Assessed 4.30.18  GOALS: (Goals have been discussed and agreed upon with patient.)  Short Term Goals 4 weeks   1. Debora Guajardo will be independent with HEP  Goal 4.30.18  2. Debora Guajardo will participate in LE stretching program to increase flexibility  Goal 4.30.18  3. Debora Guajardo will participate in core stabilization exercises to help with stabilization during ADLs Goal 4.30.18  4. Debora Guajardo will participate in LE strengthening program with weights as appropriate to help with gait and elevations Goal 4.30.18  5. Debora Guajardo will participate in static and dynamic balance activities to decrease the risk for falls Goal 4.30.18  6. Debora Guajardo will tolerate manual therapy/joint mobilizations/soft tissue to increase ROM and decrease pain Goal 4.30.18  7. Debora Guajardo will be able to stand 30 minutes with no L LE pain and minimal to no difficulty to cook. Continue  8.  Debora Guajardo will be able to go up and down stairs with no L LE pain and minimal to no difficulty. Continue  9. Storm Kendall will be able to walk >1500 feet with <=1/10 pain to L low back and LE and equal step length and no difficulty. Continue - has pain that she reports is chronic in nature  10. Long Term Goals 8 weeks   1. Storm Kendall will demonstrate a 10 point improvement on the LEFS to show improvement in function Goal Met 4/30/2018   2. Storm Kendall will report 0/10 pain at rest and during ADLs Goal Met 4/30/2018   3. Storm Kendall will demonstrate 5/5 LE strength on manual muscle testing Goal Met 4.30.18    4. Storm Kendall will be able to perform SLS >5 seconds bilaterally to help with gait and improve balance Goal Met 4/30/2018               HISTORY:   History of Present Injury/Illness (Reason for Referral):  Storm Kendall stattes she got pain the outside of her L knee. Then Wednesday the pain went up her leg and down to her lateral ankle. She went to MD and was told to go to PT. The pain feels better with the heating pad. · Aggravating factors: Walking   · Relieving factors: Heating pad. Past Medical History/Comorbidities:   Ms. Katina Montgomery  has a past medical history of Acute pancreatitis (09/2016); Adverse effect of anesthesia; Arthritis; Backache, unspecified (8/24/2015); Bladder stone (9/18/14); Cancer ([facility] [address] Chronic pain; CVA, old, dysphagia (8/24/2015); Degeneration of intervertebral disc, site unspecified (8/24/2015); Diabetes ([facility] [address] Dyslipidemia; GERD (gastroesophageal reflux disease); Hyperlipidemia (8/24/2015); Hypertension; Hypoglycemia; Osteoporosis (8/24/2015); Other allied disorders of spine (8/24/2015); Pain in joint, pelvic region and thigh (8/24/2015); Pain in limb (8/24/2015); Personal history of malignant neoplasm of breast (8/24/2015); Plantar fasciitis; Primary open-angle glaucoma(365.11) (8/24/2015); PUD (peptic ulcer disease); Senile nuclear cataract (8/24/2015);  Spinal stenosis, lumbar region, without neurogenic claudication (8/24/2015); Ulcer ([facility] [address] and Unspecified adverse effect of anesthesia. Ms. Tara Cohen  has a past surgical history that includes hx knee arthroscopy (Left, 2010); hx cholecystectomy (1970s); hx hysterectomy (1974); hx salpingo-oophorectomy (Bilateral, 1978); hx polypectomy (1990); hx gi; hx cataract removal (Left, 6/23/14, ); hx cataract removal (Right, 7/14/14); fragment kidney stone/ eswl; hx other surgical (2009); hx oophorectomy; hx total abdominal hysterectomy (1974); hx ercp (N/A, 09/2016); pr breast surgery procedure unlisted (Left, 1976); hx breast biopsy (Right); hx breast reconstruction (1981); hx breast reconstruction (1981); hx mastectomy (Left, 1976); and hx urological.  Social History/Living Environment:        Social History     Social History    Marital status:      Spouse name: N/A    Number of children: N/A    Years of education: N/A     Occupational History    Not on file.      Social History Main Topics    Smoking status: Former Smoker     Packs/day: 1.00     Years: 30.00     Quit date: 1/1/1997    Smokeless tobacco: Never Used    Alcohol use No    Drug use: No    Sexual activity: No     Other Topics Concern     Service No    Caffeine Concern No    Hobby Hazards No    Sleep Concern No    Stress Concern No    Special Diet No    Exercise No    Seat Belt Yes     Social History Narrative     Prior Level of Function/Work/Activity:      Active Ambulatory Problems     Diagnosis Date Noted    Hypertension 08/24/2015    Hyperlipidemia 08/24/2015    Osteoporosis 08/24/2015    Pain in limb 08/24/2015    Backache, unspecified 08/24/2015    Degeneration of intervertebral disc, site unspecified 08/24/2015    Other allied disorders of spine 08/24/2015    Spinal stenosis, lumbar region, without neurogenic claudication 08/24/2015    CVA, old, dysphagia 08/24/2015    Senile nuclear cataract 08/24/2015    Primary open-angle glaucoma(365.11) 08/24/2015    Personal history of malignant neoplasm of breast 08/24/2015    Pain in joint, pelvic region and thigh 08/24/2015    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    Pelvic pain 06/02/2016    Midline cystocele 06/09/2016    Bladder stones 06/27/2016    Fever 10/31/2016    RUQ abdominal pain 10/31/2016    Dysphagia 10/31/2016    Pancreatic abscess 11/02/2016    Pneumonia due to infectious organism 11/02/2016    Cholangitis due to bile duct calculus with obstruction 11/02/2016    Infected pancreatic pseudocyst 02/08/2017    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     Resolved Ambulatory Problems     Diagnosis Date Noted    Acute respiratory failure with hypoxia and hypercapnia ([address] 11/02/2016    Sepsis ([address] 11/02/2016     Past Medical History:   Diagnosis Date    Acute pancreatitis 09/2016    Adverse effect of anesthesia     Arthritis     Backache, unspecified 8/24/2015    Bladder stone 9/18/14    Cancer ([address]     Chronic pain     CVA, old, dysphagia 8/24/2015    Degeneration of intervertebral disc, site unspecified 8/24/2015    Diabetes ([address]     Dyslipidemia     GERD (gastroesophageal reflux disease)     Hyperlipidemia 8/24/2015    Hypertension     Hypoglycemia     Osteoporosis 8/24/2015    Other allied disorders of spine 8/24/2015    Pain in joint, pelvic region and thigh 8/24/2015    Pain in limb 8/24/2015    Personal history of malignant neoplasm of breast 8/24/2015    Plantar fasciitis     Primary open-angle glaucoma(365.11) 8/24/2015    PUD (peptic ulcer disease)     Senile nuclear cataract 8/24/2015    Spinal stenosis, lumbar region, without neurogenic claudication 8/24/2015    Ulcer ([address]     Unspecified adverse effect of anesthesia      Note: Patient denies any increase of symptoms with cough, sneeze or valsalva. Patient denies any saddle paresthesia or bowel/bladder deficits. Personal Factors:          Sex:  female        Age:  69 y.o.   Current Medications:    Current Outpatient Prescriptions:     meloxicam (MOBIC) 15 mg tablet, TAKE 1 TABLET BY MOUTH EVERY DAY, Disp: 90 Tab, Rfl: 3    ursodiol (ACTIGALL) 300 mg capsule, Take 300 mg by mouth two (2) times a day., Disp: , Rfl:     amLODIPine (NORVASC) 5 mg tablet, TAKE ONE TABLET BY MOUTH EVERY DAY, Disp: 90 Tab, Rfl: 3    HYDROcodone-acetaminophen (NORCO) 5-325 mg per tablet, Take 1 Tab by mouth two (2) times daily as needed for Pain for up to 30 days. Max Daily Amount: 2 Tabs., Disp: 60 Tab, Rfl: 0    [START ON 5/6/2018] HYDROcodone-acetaminophen (NORCO) 5-325 mg per tablet, Take 1 Tab by mouth two (2) times daily as needed for Pain for up to 30 days. Max Daily Amount: 2 Tabs., Disp: 60 Tab, Rfl: 0    pantoprazole (PROTONIX) 40 mg tablet, Take 1 Tab by mouth Before breakfast and dinner., Disp: 60 Tab, Rfl: 11     Date Last Reviewed:  4/30/2018   EXAMINATION:   Observation/Orthostatic Postural Assessment:          Slouched posture in sitting. Rounded shoulders  Palpation:          L L3-5 Facet, L5 SP, Pifiromis L, L sacrum. ROM:            AROM/PROM         Joint: Eval Date: 4/2/18  Re-Assess Date: 4/30/18  Re-Assess Date:    Active ROM RIGHT LEFT RIGHT LEFT RIGHT LEFT   Knee Extension [facility] WFL     Knee Flexion [facility] WFL     Hip Flexion 90 deg 90 deg 90 DEG 90 DEG     Hip Abduction         Lumbar Flexion 100% --touches toes, no pain  100% --touches toes, no pain      Lumbar Extension 100% no pain  100% --touches toes, no pain      Lumbar Side-bending Painful-70% Painful-70% 80% with pull to L low back 100%     Lumbar Rotation 100% 100% no pain. 100% 100%       Strength:     Eval Date: 4/2/18  Re-Assess Date:  Re-Assess Date:      RIGHT LEFT RIGHT LEFT RIGHT LEFT   Knee Flexion 5/5 5/5 5/5 5/5     Knee Extension 5/5 5/5 5/5 5/5     Hip Flexion 5/5 5/5 5/5 5/5     Hip Abduction 5/5 4/5 5/5 5/5     Ankle Dorsiflexion 5/5 5/5 5/5 5/5                         Single leg stance right/left: Fair.                Deep squat: Does not perform.       Ham 90/90:   RIGHT LE: 90 deg   LEFT LE: 90 deg    Special Tests:    SUSANA 4:  Negative    SLUMP TEST: Negative   Neurological Screen: t    RADIATING SYMPTOMS?: YES/NO --in [address] position, yes. Functional Mobility:  Affecting participation in basic ADLs and functional tasks. Balance:          Fair   CLINICAL DECISION MAKING:   Outcome Measure: Tool Used: Lower Extremity Functional Scale (LEFS)  Score:  Initial: 52/80 Most Recent: 50/80 (Date: 4/30/18)   Interpretation of Score: 20 questions each scored on a 5 point scale with 0 representing \"extreme difficulty or unable to perform\" and 4 representing \"no difficulty\". The lower the score, the greater the functional disability. 80/80 represents no disability. Minimal detectable change is 9 points. Score 80 79-63 62-48 47-32 31-16 15-1 0   Modifier CH CI CJ CK CL CM CN     ? Mobility - Walking and Moving Around:     - CURRENT STATUS: CJ - 20%-39% impaired, limited or restricted    - GOAL STATUS: CI - 1%-19% impaired, limited or restricted    - D/C STATUS:  ---------------To be determined---------------     TREATMENT:   (In addition to Assessment/Re-Assessment sessions the following treatments were rendered)  THERAPEUTIC EXERCISE: (15 minutes):  Exercises per grid below to improve mobility, strength and balance. Required minimal visual and verbal cues to promote proper body alignment and promote proper body posture. Progressed resistance, range and complexity of movement as indicated.      Date:  4/2/18 Date:   4/5/18   Date:  4/10/18   Date:  4/12/18   Date:  4/16/18 Date:  4/19/18 4/27/18   Activity/Exercise Parameters Parameters Parameters       NuStep  Level 2' 10 minutes Level 2' 10 minutes Level 2' 10 minutes with MHP Level 2' 10 minutes with MHP Level 2' 10 minutes with MHP Level 2 x 10 min with MHP    LTR  10\"X10 10\"X10 10\"X10 10\"X10     Bridge  Slight lift to get rotation of hipsx10 Slight lift to get rotation of hipsx10       Hooklying hip abduction   Green 10x  Green 10\"x10     Hip Flex into hands, working on Etreasurebox activation strategy      Hold 10 seconds (hooklying position hands pushing against knees isometric). 5x intro    Education on TrA/MF/Diaphragm/Pelvic Floor. .    Diaphragmatic Breathing      5 min teaching and contractions   HEP    Pelvic Floor Contraction      10\"x5  HEP      MedBridge Portal    MANUAL THERAPY: (40 minutes): Joint mobilization, Soft tissue mobilization and Manipulation was utilized and necessary because of the patient's restricted joint motion, painful spasm, restricted motion of soft tissue. (Used abbreviations: MET - muscle energy technique; PNF - proprioceptive neuromuscular facilitation; NMR - neuromuscular re-education; AP - anterior to posterior; PA - posterior to anterior)    PA L sacrum Grade II 6 bouts in R SL. MFR L pirifiromis. STM L3-5 L low back in sitting/R SL.  STM lateral knee/IT band        Treatment/Session Assessment:  Debora Guajardo reports soreness from last session; however, overall is significantly better than from initial evaluation. Will need to return to postural exercises next week to continue strengthening. · Pain/ Symptoms: Initial:   2/10 soreness ITB L and low back,  Pt reports consistent with HEP. Post Session:  0/10 with ambulation     ·   Compliance with Program/Exercises: Will assess as treatment progresses. Recommendations/Intent for next treatment session: \"Next visit will focus on advancements to more challenging activities\".     Future Appointments  Date Time Provider Surya Jessica   5/3/2018 2:30 PM Lex Rojas MD [facility]   5/9/2018 1:45 PM Senia Barreto DPT [facility]   7/25/2018 9:45 AM Lex Rojas MD [facility]       Total Treatment Duration: 55 minutes  PT Patient Time In/Time Out  Time In: 1300  Time Out: [address] [place] [address] Kylee Fonseca DPT

## 2018-05-09 ENCOUNTER — HOSPITAL ENCOUNTER (OUTPATIENT)
Dept: PHYSICAL THERAPY | Age: 78
Discharge: HOME | End: 2018-05-09
Attending: FAMILY MEDICINE
Payer: MEDICARE

## 2018-05-09 PROCEDURE — 97140 MANUAL THERAPY 1/> REGIONS: CPT

## 2018-05-09 PROCEDURE — G8979 MOBILITY GOAL STATUS: HCPCS

## 2018-05-09 PROCEDURE — G8980 MOBILITY D/C STATUS: HCPCS

## 2018-05-09 PROCEDURE — 97110 THERAPEUTIC EXERCISES: CPT

## 2018-05-09 NOTE — PROGRESS NOTES
Jun Monday  : 1940      [place] / [place] / Product Type: Medicare /  [address] at [address] [place] [address]. [facility], [address], [facility], [address] [place] [address]  [phone]   [phone]       OUTPATIENT PHYSICAL THERAPY:Daily Note, Discharge, Treatment Day: 9 and PM 2018    ICD-10: Treatment Diagnosis: *   Low back pain (M54.5)           Sciatica, left side (M54.32)        Lumbago with sciatica, left side (M54.42)       Precautions/Allergies:   Promethazine and Hydrocortisone   Fall Risk Score: 1 (? 5 = High Risk)  MD Orders: Eval and Treat  MEDICAL/REFERRING DIAGNOSIS:  Sciatic neuropathy, left   DATE OF ONSET: Chronic  REFERRING PHYSICIAN: Tyrel Hawley MD  RETURN PHYSICIAN APPOINTMENT: May 2018     5/9/18:  Jun Monday returns to therapy for last visit. Most goals have been met. She continues to have improvement from initial evaluation and resultantly will DC from Physical Therapy this session. I encouraged her to contact us if we can be of any further assistance. Thank you for the referral!       PROBLEM LIST (Impacting functional limitations):  1. Decreased Strength  2. Decreased ADL/Functional Activities  3. Decreased Transfer Abilities  4. Decreased Ambulation Ability/Technique  5. Decreased Balance  6. Increased Pain  7. Decreased Activity Tolerance  8. Increased Fatigue  9. Increased Shortness of Breath  10. Decreased Flexibility/Joint Mobility  11. Decreased Weber with Home Exercise Program INTERVENTIONS PLANNED:  1. Balance Exercise  2. Bed Mobility  3. Cold  4. Cryotherapy  5. Electrical Stimulation  6. Family Education  7. Gait Training  8. Heat  9. Home Exercise Program (HEP)  10. Manual Therapy  11. Neuromuscular Re-education/Strengthening  12. Range of Motion (ROM)  13. Therapeutic Activites  14. Therapeutic Exercise/Strengthening  15.  Transfer Training   TREATMENT PLAN:  Effective Dates: 18 TO 7/1/2018 (90 days). Frequency/Duration: 2 times a week for 90 Days  Assessed 4.30.18; 5/9/2018   GOALS: (Goals have been discussed and agreed upon with patient.)  Short Term Goals 4 weeks   1. Rodney Fess will be independent with HEP  Goal 4.30.18; 5/9/2018_  2. Rodney Fess will participate in LE stretching program to increase flexibility  Goal 4.30.18; 5/9/2018_  3. Rodney Fess will participate in core stabilization exercises to help with stabilization during ADLs Goal 4.30.18; 5/9/2018_  4. Rodney Fess will participate in LE strengthening program with weights as appropriate to help with gait and elevations Goal 4.30.18; 5/9/2018_  5. Rodney Fess will participate in static and dynamic balance activities to decrease the risk for falls Goal 4.30.18; 5/9/2018_  6. Rodney Fess will tolerate manual therapy/joint mobilizations/soft tissue to increase ROM and decrease pain Goal 4.30.18; 5/9/2018_  7. Rodney Fess will be able to stand 30 minutes with no L LE pain and minimal to no difficulty to cook. Continue  8. Rodney Fess will be able to go up and down stairs with no L LE pain and minimal to no difficulty. Goal Met 5/9/2018   9. Rodney Fess will be able to walk >1500 feet with <=1/10 pain to L low back and LE and equal step length and no difficulty. Continue - has pain that she reports is chronic in nature  10. Long Term Goals 8 weeks   1. Rodney Fess will demonstrate a 10 point improvement on the LEFS to show improvement in function Goal Met 5/9/2018 ; 5/9/2018_  2. Rodney Fess will report 0/10 pain at rest and during ADLs Goal Met 4/30/2018 ; 5/9/2018_  3. Rodney Fess will demonstrate 5/5 LE strength on manual muscle testing Goal Met 4.30.18  ; 5/9/2018_  4.  Rodney Fess will be able to perform SLS >5 seconds bilaterally to help with gait and improve balanc;e Goal Met 4/30/2018 ; 5/9/2018_              HISTORY:   History of Present Injury/Illness (Reason for Referral):  Rodney Fess stattes she got pain the outside of her L knee. Then Wednesday the pain went up her leg and down to her lateral ankle. She went to MD and was told to go to PT. The pain feels better with the heating pad. · Aggravating factors: Walking   · Relieving factors: Heating pad. Past Medical History/Comorbidities:   Ms. Jorge L Daigle  has a past medical history of Acute pancreatitis (09/2016); Adverse effect of anesthesia; Arthritis; Backache, unspecified (8/24/2015); Bladder stone (9/18/14); Cancer ([facility] Chronic pain; CVA, old, dysphagia (8/24/2015); Degeneration of intervertebral disc, site unspecified (8/24/2015); [facility] Dyslipidemia; GERD (gastroesophageal reflux disease); Hyperlipidemia (8/24/2015); Hypertension; Hypoglycemia; Osteoporosis (8/24/2015); Other allied disorders of spine (8/24/2015); Pain in joint, pelvic region and thigh (8/24/2015); Pain in limb (8/24/2015); Personal history of malignant neoplasm of breast (8/24/2015); Plantar fasciitis; Primary open-angle glaucoma(365.11) (8/24/2015); PUD (peptic ulcer disease); Senile nuclear cataract (8/24/2015); Spinal stenosis, lumbar region, without neurogenic claudication (8/24/2015); Ulcer; and Unspecified adverse effect of anesthesia. Ms. Jorge L Daigle  has a past surgical history that includes hx knee arthroscopy (Left, 2010); hx cholecystectomy (1970s); hx hysterectomy (1974); hx salpingo-oophorectomy (Bilateral, 1978); hx polypectomy (1990); hx gi; hx cataract removal (Left, 6/23/14, ); hx cataract removal (Right, 7/14/14); fragment kidney stone/ eswl; hx other surgical (2009); hx oophorectomy; hx total abdominal hysterectomy (1974); hx ercp (N/A, 09/2016); pr breast surgery procedure unlisted (Left, 1976); hx breast biopsy (Right); hx breast reconstruction (1981); hx breast reconstruction (1981); hx mastectomy (Left, 1976); and hx urological.  Social History/Living Environment:        Social History     Social History    Marital status:      Spouse name: N/A  Number of children: N/A    Years of education: N/A     Occupational History    Not on file.      Social History Main Topics    Smoking status: Former Smoker     Packs/day: 1.00     Years: 30.00     Quit date: 1/1/1997    Smokeless tobacco: Never Used    Alcohol use No    Drug use: No    Sexual activity: No     Other Topics Concern     Service No    Blood Transfusions Yes    Caffeine Concern No    Occupational Exposure No    Hobby Hazards No    Sleep Concern No    Stress Concern No    Weight Concern Yes    Special Diet No    Exercise No    Seat Belt Yes     Social History Narrative     Prior Level of Function/Work/Activity:      Active Ambulatory Problems     Diagnosis Date Noted    Hypertension 08/24/2015    Hyperlipidemia 08/24/2015    Osteoporosis 08/24/2015    Pain in limb 08/24/2015    Backache, unspecified 08/24/2015    Degeneration of intervertebral disc, site unspecified 08/24/2015    Other allied disorders of spine 08/24/2015    Spinal stenosis, lumbar region, without neurogenic claudication 08/24/2015    CVA, old, dysphagia 08/24/2015    Senile nuclear cataract 08/24/2015    Primary open-angle glaucoma(365.11) 08/24/2015    Personal history of malignant neoplasm of breast 08/24/2015    Pain in joint, pelvic region and thigh 08/24/2015    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    Pelvic pain 06/02/2016    Midline cystocele 06/09/2016    Bladder stones 06/27/2016    Fever 10/31/2016    RUQ abdominal pain 10/31/2016    Dysphagia 10/31/2016    Pancreatic abscess 11/02/2016    Pneumonia due to infectious organism 11/02/2016    Cholangitis due to bile duct calculus with obstruction 11/02/2016    Infected pancreatic pseudocyst 02/08/2017    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     Resolved Ambulatory Problems     Diagnosis Date Noted    Acute respiratory failure with hypoxia and hypercapnia ([address] 11/02/2016    Sepsis ([address] 11/02/2016     Past Medical History: Diagnosis Date    Acute pancreatitis 09/2016    Adverse effect of anesthesia     Arthritis     Backache, unspecified 8/24/2015    Bladder stone 9/18/14    [facility]     Chronic pain     CVA, old, dysphagia 8/24/2015    Degeneration of intervertebral disc, site unspecified 8/24/2015    [facility] ([facility]     Dyslipidemia     GERD (gastroesophageal reflux disease)     Hyperlipidemia 8/24/2015    Hypertension     Hypoglycemia     Osteoporosis 8/24/2015    Other allied disorders of spine 8/24/2015    Pain in joint, pelvic region and thigh 8/24/2015    Pain in limb 8/24/2015    Personal history of malignant neoplasm of breast 8/24/2015    Plantar fasciitis     Primary open-angle glaucoma(365.11) 8/24/2015    PUD (peptic ulcer disease)     Senile nuclear cataract 8/24/2015    Spinal stenosis, lumbar region, without neurogenic claudication 8/24/2015    Ulcer     Unspecified adverse effect of anesthesia      Note: Patient denies any increase of symptoms with cough, sneeze or valsalva. Patient denies any saddle paresthesia or bowel/bladder deficits. Personal Factors:          Sex:  female        Age:  69 y.o. Current Medications:    Current Outpatient Prescriptions:     HYDROcodone-acetaminophen (NORCO) 5-325 mg per tablet, Take 1 Tab by mouth every eight (8) hours as needed for Pain for up to 30 days. Max Daily Amount: 3 Tabs., Disp: 90 Tab, Rfl: 0    meloxicam (MOBIC) 15 mg tablet, TAKE 1 TABLET BY MOUTH EVERY DAY, Disp: 90 Tab, Rfl: 3    ursodiol (ACTIGALL) 300 mg capsule, Take 300 mg by mouth two (2) times a day., Disp: , Rfl:     amLODIPine (NORVASC) 5 mg tablet, TAKE ONE TABLET BY MOUTH EVERY DAY, Disp: 90 Tab, Rfl: 3    HYDROcodone-acetaminophen (NORCO) 5-325 mg per tablet, Take 1 Tab by mouth two (2) times daily as needed for Pain for up to 30 days.  Max Daily Amount: 2 Tabs., Disp: 60 Tab, Rfl: 0    pantoprazole (PROTONIX) 40 mg tablet, Take 1 Tab by mouth Before breakfast and dinner., Disp: 60 Tab, Rfl: 11     Date Last Reviewed:  5/9/2018   EXAMINATION:   Observation/Orthostatic Postural Assessment:          Slouched posture in sitting. Rounded shoulders  Palpation:          L L3-5 Facet, L5 SP, Pifiromis L, L sacrum. ROM:            AROM/PROM         Joint: Eval Date: 4/2/18  Re-Assess Date: 4/30/18  Re-Assess Date: 5/9/18    Active ROM RIGHT LEFT RIGHT LEFT RIGHT LEFT   Knee Extension [facility] WFL   Knee Flexion [facility] WFL WFL WFL   Hip Flexion 90 deg 90 deg 90 DEG 90 DEG 90 DEG 90 DEG   Hip Abduction         Lumbar Flexion 100% --touches toes, no pain  100% --touches toes, no pain  100% --touches toes, no pain    Lumbar Extension 100% no pain  100% --touches toes, no pain  100% --touches toes, no pain    Lumbar Side-bending Painful-70% Painful-70% 80% with pull to L low back 100% 100% 100%   Lumbar Rotation 100% 100% no pain. 100% 100% 100% 100%     Strength:     Eval Date: 4/2/18  Re-Assess Date:  Re-Assess Date: 5/9/18      RIGHT LEFT RIGHT LEFT RIGHT LEFT   Knee Flexion 5/5 5/5 5/5 5/5 5/5 5/5   Knee Extension 5/5 5/5 5/5 5/5 5/5 5/5   Hip Flexion 5/5 5/5 5/5 5/5 5/5 5/5   Hip Abduction 5/5 4/5 5/5 5/5 5/5 5/5   Ankle Dorsiflexion 5/5 5/5 5/5 5/5 5/5 5/5                       Single leg stance right/left: Fair.                Deep squat: Does not perform. Ham 90/90:   RIGHT LE: 90 deg   LEFT LE: 90 deg    Special Tests:    SUSANA 4:  Negative    SLUMP TEST: Negative   Neurological Screen: t    RADIATING SYMPTOMS?: YES/NO --in [address] position, yes. Functional Mobility:  Affecting participation in basic ADLs and functional tasks. Balance:          Fair   CLINICAL DECISION MAKING:   Outcome Measure:    Tool Used: Lower Extremity Functional Scale (LEFS)  Score:  Initial: 52/80 Most Recent: 64/80 (Date: 4/30/18)   Interpretation of Score: 20 questions each scored on a 5 point scale with 0 representing \"extreme difficulty or unable to perform\" and 4 representing \"no difficulty\". The lower the score, the greater the functional disability. 80/80 represents no disability. Minimal detectable change is 9 points. Score 80 79-63 62-48 47-32 31-16 15-1 0   Modifier CH CI CJ CK CL CM CN     ? Mobility - Walking and Moving Around:     - CURRENT STATUS: CI - 1%-19% impaired, limited or restricted    - GOAL STATUS: CI - 1%-19% impaired, limited or restricted    - D/C STATUS:  CI - 1%-19% impaired, limited or restricted     TREATMENT:   (In addition to Assessment/Re-Assessment sessions the following treatments were rendered)  THERAPEUTIC EXERCISE: (10 minutes):  Exercises per grid below to improve mobility, strength and balance. Required minimal visual and verbal cues to promote proper body alignment and promote proper body posture. Progressed resistance, range and complexity of movement as indicated. Date:  4/2/18 Date:   4/5/18   Date:  4/10/18   Date:  4/12/18   Date:  4/16/18 Date:  4/19/18 4/27/18   Activity/Exercise Parameters Parameters Parameters       NuStep  Level 2' 10 minutes Level 2' 10 minutes Level 2' 10 minutes with MHP Level 2' 10 minutes with MHP Level 2' 10 minutes with MHP Level 2 x 10 min with MHP    LTR  10\"X10 10\"X10 10\"X10 10\"X10     Bridge  Slight lift to get rotation of hipsx10 Slight lift to get rotation of hipsx10       Hooklying hip abduction   Green 10x  Green 10\"x10     Hip Flex into hands, working on [employer]fort[employer]uel activation strategy      Hold 10 seconds (hooklying position hands pushing against knees isometric). 5x intro    Education on TrA/MF/Diaphragm/Pelvic Floor. .    Diaphragmatic Breathing      5 min teaching and contractions   HEP    Pelvic Floor Contraction      10\"x5  HEP      MedBridge Portal    MANUAL THERAPY: (40 minutes): Joint mobilization, Soft tissue mobilization and Manipulation was utilized and necessary because of the patient's restricted joint motion, painful spasm, restricted motion of soft tissue. (Used abbreviations: MET - muscle energy technique; PNF - proprioceptive neuromuscular facilitation; NMR - neuromuscular re-education; AP - anterior to posterior; PA - posterior to anterior)    PA L sacrum Grade II 6 bouts in R SL. MFR L pirifiromis. STM L3-5 L low back in sitting/R SL.  STM lateral knee/IT band        Treatment/Session Assessment:  Marcia Gore reports doing well--will DC this session. Goals Met as seen above. · Pain/ Symptoms: Initial:   1/10 soreness ITB L and low back,  Pt reports consistent with HEP.   Post Session:  0/10 with ambulation         Future Appointments  Date Time Provider Surya Jessica   7/25/2018 9:45 AM Maddie Jimenez MD [facility]       Total Treatment Duration: 55 minutes  PT Patient Time In/Time Out  Time In: 3781  Time Out: Christi  Brenton Kerr DPT          j

## 2018-07-29 ENCOUNTER — ANESTHESIA EVENT (OUTPATIENT)
Dept: ENDOSCOPY | Age: 78
End: 2018-07-29
Payer: MEDICARE

## 2018-07-30 ENCOUNTER — HOSPITAL ENCOUNTER (OUTPATIENT)
Age: 78
Setting detail: OUTPATIENT SURGERY
Discharge: HOME | End: 2018-07-30
Attending: INTERNAL MEDICINE | Admitting: INTERNAL MEDICINE
Payer: MEDICARE

## 2018-07-30 ENCOUNTER — APPOINTMENT (OUTPATIENT)
Dept: GENERAL RADIOLOGY | Age: 78
End: 2018-07-30
Attending: INTERNAL MEDICINE
Payer: MEDICARE

## 2018-07-30 ENCOUNTER — ANESTHESIA (OUTPATIENT)
Dept: ENDOSCOPY | Age: 78
End: 2018-07-30
Payer: MEDICARE

## 2018-07-30 VITALS
DIASTOLIC BLOOD PRESSURE: 67 MMHG | BODY MASS INDEX: 31.18 KG/M2 | SYSTOLIC BLOOD PRESSURE: 150 MMHG | WEIGHT: 176 LBS | HEIGHT: 63 IN | RESPIRATION RATE: 16 BRPM | TEMPERATURE: 97.2 F | OXYGEN SATURATION: 94 % | HEART RATE: 61 BPM

## 2018-07-30 LAB — GLUCOSE BLD STRIP.AUTO-MCNC: 98 MG/DL (ref 65–100)

## 2018-07-30 PROCEDURE — 77030009038 HC CATH BILI STN RTVR BSC -C: Performed by: INTERNAL MEDICINE

## 2018-07-30 PROCEDURE — 74330 X-RAY BILE/PANC ENDOSCOPY: CPT

## 2018-07-30 PROCEDURE — 74011250636 HC RX REV CODE- 250/636

## 2018-07-30 PROCEDURE — 74011636320 HC RX REV CODE- 636/320: Performed by: INTERNAL MEDICINE

## 2018-07-30 PROCEDURE — 77030013991 HC SNR POLYP ENDOSC BSC -A: Performed by: INTERNAL MEDICINE

## 2018-07-30 PROCEDURE — 77030012595 HC SPHNTOM BILI BSC -D: Performed by: INTERNAL MEDICINE

## 2018-07-30 PROCEDURE — 74011000250 HC RX REV CODE- 250

## 2018-07-30 PROCEDURE — 76040000026: Performed by: INTERNAL MEDICINE

## 2018-07-30 PROCEDURE — 76060000033 HC ANESTHESIA 1 TO 1.5 HR: Performed by: INTERNAL MEDICINE

## 2018-07-30 PROCEDURE — 77030032490 HC SLV COMPR SCD KNE COVD -B: Performed by: INTERNAL MEDICINE

## 2018-07-30 PROCEDURE — 77030008703 HC TU ET UNCUF COVD -A: Performed by: ANESTHESIOLOGY

## 2018-07-30 PROCEDURE — 77030007288 HC DEV LOK BILI BSC -A: Performed by: INTERNAL MEDICINE

## 2018-07-30 PROCEDURE — 74011250636 HC RX REV CODE- 250/636: Performed by: ANESTHESIOLOGY

## 2018-07-30 PROCEDURE — 77030008477 HC STYL SATN SLP COVD -A: Performed by: ANESTHESIOLOGY

## 2018-07-30 PROCEDURE — 82962 GLUCOSE BLOOD TEST: CPT

## 2018-07-30 RX ORDER — EPHEDRINE SULFATE 50 MG/ML
INJECTION, SOLUTION INTRAVENOUS AS NEEDED
Status: DISCONTINUED | OUTPATIENT
Start: 2018-07-30 | End: 2018-07-30 | Stop reason: HOSPADM

## 2018-07-30 RX ORDER — ROCURONIUM BROMIDE 10 MG/ML
INJECTION, SOLUTION INTRAVENOUS AS NEEDED
Status: DISCONTINUED | OUTPATIENT
Start: 2018-07-30 | End: 2018-07-30 | Stop reason: HOSPADM

## 2018-07-30 RX ORDER — FENTANYL CITRATE 50 UG/ML
INJECTION, SOLUTION INTRAMUSCULAR; INTRAVENOUS AS NEEDED
Status: DISCONTINUED | OUTPATIENT
Start: 2018-07-30 | End: 2018-07-30 | Stop reason: HOSPADM

## 2018-07-30 RX ORDER — SODIUM CHLORIDE, SODIUM LACTATE, POTASSIUM CHLORIDE, CALCIUM CHLORIDE 600; 310; 30; 20 MG/100ML; MG/100ML; MG/100ML; MG/100ML
100 INJECTION, SOLUTION INTRAVENOUS CONTINUOUS
Status: DISCONTINUED | OUTPATIENT
Start: 2018-07-30 | End: 2018-07-30 | Stop reason: HOSPADM

## 2018-07-30 RX ORDER — ONDANSETRON 2 MG/ML
INJECTION INTRAMUSCULAR; INTRAVENOUS AS NEEDED
Status: DISCONTINUED | OUTPATIENT
Start: 2018-07-30 | End: 2018-07-30 | Stop reason: HOSPADM

## 2018-07-30 RX ORDER — LIDOCAINE HYDROCHLORIDE 20 MG/ML
INJECTION, SOLUTION EPIDURAL; INFILTRATION; INTRACAUDAL; PERINEURAL AS NEEDED
Status: DISCONTINUED | OUTPATIENT
Start: 2018-07-30 | End: 2018-07-30 | Stop reason: HOSPADM

## 2018-07-30 RX ORDER — GLYCOPYRROLATE 0.2 MG/ML
INJECTION INTRAMUSCULAR; INTRAVENOUS AS NEEDED
Status: DISCONTINUED | OUTPATIENT
Start: 2018-07-30 | End: 2018-07-30 | Stop reason: HOSPADM

## 2018-07-30 RX ORDER — SODIUM CHLORIDE 0.9 % (FLUSH) 0.9 %
5-10 SYRINGE (ML) INJECTION AS NEEDED
Status: DISCONTINUED | OUTPATIENT
Start: 2018-07-30 | End: 2018-07-30 | Stop reason: HOSPADM

## 2018-07-30 RX ORDER — SUCCINYLCHOLINE CHLORIDE 20 MG/ML
INJECTION INTRAMUSCULAR; INTRAVENOUS AS NEEDED
Status: DISCONTINUED | OUTPATIENT
Start: 2018-07-30 | End: 2018-07-30 | Stop reason: HOSPADM

## 2018-07-30 RX ORDER — ONDANSETRON 2 MG/ML
4 INJECTION INTRAMUSCULAR; INTRAVENOUS ONCE
Status: COMPLETED | OUTPATIENT
Start: 2018-07-30 | End: 2018-07-30

## 2018-07-30 RX ORDER — PROPOFOL 10 MG/ML
INJECTION, EMULSION INTRAVENOUS AS NEEDED
Status: DISCONTINUED | OUTPATIENT
Start: 2018-07-30 | End: 2018-07-30 | Stop reason: HOSPADM

## 2018-07-30 RX ADMIN — EPHEDRINE SULFATE 10 MG: 50 INJECTION, SOLUTION INTRAVENOUS at 12:08

## 2018-07-30 RX ADMIN — SODIUM CHLORIDE, SODIUM LACTATE, POTASSIUM CHLORIDE, AND CALCIUM CHLORIDE 100 ML/HR: 600; 310; 30; 20 INJECTION, SOLUTION INTRAVENOUS at 10:50

## 2018-07-30 RX ADMIN — ONDANSETRON 4 MG: 2 INJECTION INTRAMUSCULAR; INTRAVENOUS at 11:59

## 2018-07-30 RX ADMIN — IOPAMIDOL 14 ML: 755 INJECTION, SOLUTION INTRAVENOUS at 12:51

## 2018-07-30 RX ADMIN — ONDANSETRON 4 MG: 2 INJECTION INTRAMUSCULAR; INTRAVENOUS at 13:45

## 2018-07-30 RX ADMIN — LIDOCAINE HYDROCHLORIDE 60 MG: 20 INJECTION, SOLUTION EPIDURAL; INFILTRATION; INTRACAUDAL; PERINEURAL at 11:47

## 2018-07-30 RX ADMIN — ROCURONIUM BROMIDE 5 MG: 10 INJECTION, SOLUTION INTRAVENOUS at 11:47

## 2018-07-30 RX ADMIN — FENTANYL CITRATE 50 MCG: 50 INJECTION, SOLUTION INTRAMUSCULAR; INTRAVENOUS at 11:44

## 2018-07-30 RX ADMIN — PROPOFOL 50 MG: 10 INJECTION, EMULSION INTRAVENOUS at 12:01

## 2018-07-30 RX ADMIN — PROPOFOL 150 MG: 10 INJECTION, EMULSION INTRAVENOUS at 11:47

## 2018-07-30 RX ADMIN — GLYCOPYRROLATE 0.2 MG: 0.2 INJECTION INTRAMUSCULAR; INTRAVENOUS at 11:57

## 2018-07-30 RX ADMIN — SUCCINYLCHOLINE CHLORIDE 160 MG: 20 INJECTION INTRAMUSCULAR; INTRAVENOUS at 11:47

## 2018-07-30 NOTE — DISCHARGE INSTRUCTIONS
Endoscopic Retrograde Cholangiopancreatogram (ERCP): What to Expect at [address]  After you have an endoscopic retrograde cholangiopancreatogram (ERCP). You will be able to go home after your doctor or a nurse checks to make sure you are not having any problems. If you stay in the hospital overnight, you may go home the next day. You may have a sore throat for a day or two after the procedure. This care sheet gives you a general idea about how long it will take for you to recover. But each person recovers at a different pace. Follow the steps below to get better as quickly as possible. How can you care for yourself at home? Activity  1. Rest as much as you need to after you go home. 2. You should be able to go back to your usual activities the day after the procedure. Diet  · Follow your doctor's directions for eating after the procedure. · Drink plenty of fluids (unless your doctor tells you not to). Medicines  · If you have a sore throat the next day, use an over-the-counter spray to numb your throat. Follow-up care is a key part of your treatment and safety. Be sure to make and go to all appointments, and call your doctor if you are having problems. Its also a good idea to know your test results and keep a list of the medicines you take. When should you call for help? Call 911 anytime you think you may need emergency care. For example, call if:  · You vomit blood or what looks like coffee grounds. · You pass maroon or bloody stools. Call your doctor now or go to the emergency room if:  · You have trouble swallowing. · You have belly pain. · Your stools are black and tarlike or have streaks of blood. · You are sick to your stomach and cannot drink fluids. · You have a fever. · You have pain that does not get better after you take your pain medicine. Watch closely for changes in your health, and be sure to contact your doctor if:  · Your throat still hurts after a day or two.   You do not get better as expected. After general anesthesia or intravenous sedation, for 24 hours or while taking prescription Narcotics:  · Limit your activities  · Do not drive and operate hazardous machinery  · Do not make important personal or business decisions  · Do  not drink alcoholic beverages  · If you have not urinated within 8 hours after discharge, please contact your surgeon on call. *  Please give a list of your current medications to your Primary Care Provider. *  Please update this list whenever your medications are discontinued, doses are      changed, or new medications (including over-the-counter products) are added. *  Please carry medication information at all times in case of emergency situations. These are general instructions for a healthy lifestyle:  No smoking/ No tobacco products/ Avoid exposure to second hand smoke  Surgeon General's Warning:  Quitting smoking now greatly reduces serious risk to your health. Obesity, smoking, and sedentary lifestyle greatly increases your risk for illness  A healthy diet, regular physical exercise & weight monitoring are important for maintaining a healthy lifestyle    Recognize signs and symptoms of STROKE:  F-face looks uneven  A-arms unable to move or move unevenly  S-speech slurred or non-existent  T-time-call 911 as soon as signs and symptoms begin-DO NOT go       Back to bed or wait to see if you get better-TIME IS BRAIN.

## 2018-07-30 NOTE — IP AVS SNAPSHOT
[address] [address] 
[phone] Patient: Livia Talamantes MRN: [id number] About your hospitalization You were admitted on:  July 30, 2018 You last received care in the:  [facility] PACU You were discharged on:  July 30, 2018 Why you were hospitalized Your primary diagnosis was:  Not on File Follow-up Information Follow up With Details Comments Contact Info Yang Arellano MD  Follow ups needed [address] [facility] [address] [facility] [address] 
[phone] Discharge Orders None A check choco indicates which time of day the medication should be taken. My Medications ASK your doctor about these medications Instructions Each Dose to Equal  
 Morning Noon Evening Bedtime  
 amLODIPine 5 mg tablet Commonly known as:  Fracisco Dinero Your last dose was: Your next dose is: Take 5 mg by mouth nightly. 5 mg  
    
   
   
   
  
 meloxicam 15 mg tablet Commonly known as:  MOBIC Your last dose was: Your next dose is: TAKE 1 TABLET BY MOUTH EVERY DAY  
     
   
   
   
  
 pantoprazole 40 mg tablet Commonly known as:  PROTONIX Your last dose was: Your next dose is: Take 1 Tab by mouth Before breakfast and dinner. 40 mg  
    
   
   
   
  
 ursodiol 300 mg capsule Commonly known as:  ACTIGALL Your last dose was: Your next dose is: Take 300 mg by mouth three (3) times daily (with meals). 300 mg Discharge Instructions Endoscopic Retrograde Cholangiopancreatogram (ERCP): What to Expect at [facility] Your Recovery After you have an endoscopic retrograde cholangiopancreatogram (ERCP).   You will be able to go home after your doctor or a nurse checks to make sure you are not having any problems. If you stay in the hospital overnight, you may go home the next day. You may have a sore throat for a day or two after the procedure. This care sheet gives you a general idea about how long it will take for you to recover. But each person recovers at a different pace. Follow the steps below to get better as quickly as possible. How can you care for yourself at home? Activity 1. Rest as much as you need to after you go home. 2. You should be able to go back to your usual activities the day after the procedure. Diet · Follow your doctor's directions for eating after the procedure. · Drink plenty of fluids (unless your doctor tells you not to). Medicines · If you have a sore throat the next day, use an over-the-counter spray to numb your throat. Follow-up care is a key part of your treatment and safety. Be sure to make and go to all appointments, and call your doctor if you are having problems. Its also a good idea to know your test results and keep a list of the medicines you take. When should you call for help? Call 911 anytime you think you may need emergency care. For example, call if: 
· You vomit blood or what looks like coffee grounds. · You pass maroon or bloody stools. Call your doctor now or go to the emergency room if: 
· You have trouble swallowing. · You have belly pain. · Your stools are black and tarlike or have streaks of blood. · You are sick to your stomach and cannot drink fluids. · You have a fever. · You have pain that does not get better after you take your pain medicine. Watch closely for changes in your health, and be sure to contact your doctor if: 
· Your throat still hurts after a day or two. You do not get better as expected. After general anesthesia or intravenous sedation, for 24 hours or while taking prescription Narcotics: · Limit your activities · Do not drive and operate hazardous machinery · Do not make important personal or business decisions · Do  not drink alcoholic beverages · If you have not urinated within 8 hours after discharge, please contact your surgeon on call. *  Please give a list of your current medications to your Primary Care Provider. *  Please update this list whenever your medications are discontinued, doses are 
    changed, or new medications (including over-the-counter products) are added. *  Please carry medication information at all times in case of emergency situations. These are general instructions for a healthy lifestyle: No smoking/ No tobacco products/ Avoid exposure to second hand smoke Surgeon General's Warning:  Quitting smoking now greatly reduces serious risk to your health. Obesity, smoking, and sedentary lifestyle greatly increases your risk for illness A healthy diet, regular physical exercise & weight monitoring are important for maintaining a healthy lifestyle Recognize signs and symptoms of STROKE: 
F-face looks uneven A-arms unable to move or move unevenly S-speech slurred or non-existent T-time-call 911 as soon as signs and symptoms begin-DO NOT go Back to bed or wait to see if you get better-TIME IS BRAIN. ACO Transitions of Care Introducing [employer] [address] Dottie Sadler offers a voluntary care coordination program to provide high quality service and care to [facility] fee-for-service beneficiaries. Elie Pimentel was designed to help you enhance your health and well-being through the following services: ? Transitions of Care  support for individuals who are transitioning from one care setting to another (example: Hospital to home). ? Chronic and Complex Care Coordination  support for individuals and caregivers of those with serious or chronic illnesses or with more than one chronic (ongoing) condition and those who take a number of different medications. If you meet specific medical criteria, a [address] [place] [address] Rd may call you directly to coordinate your care with your primary care physician and your other care providers. For questions about the [facility] programs, please, contact your physicians office. For general questions or additional information about Accountable Care Organizations: 
Please visit www.medicare.gov/acos. html or call 1-800-MEDICARE (4-171.691.4972) TTY users should call 4-515.818.3074. Introducing [facility]! [employer] introduces Jogg patient portal. Now you can access parts of your medical record, email your doctor's office, and request medication refills online. 1. In your internet browser, go to [email or web address] 2. Click on the First Time User? Click Here link in the Sign In box. You will see the New Member Sign Up page. 3. Enter your Jogg Access Code exactly as it appears below. You will not need to use this code after youve completed the sign-up process. If you do not sign up before the expiration date, you must request a new code. · Jogg Access Code: [email or web address] Expires: 8/1/2018  2:52 PM 
 
4. Enter the last four digits of your Social Security Number (xxxx) and Date of Birth (mm/dd/yyyy) as indicated and click Submit. You will be taken to the next sign-up page. 5. Create a Jogg ID. This will be your Jogg login ID and cannot be changed, so think of one that is secure and easy to remember. 6. Create a Jogg password. You can change your password at any time. 7. Enter your Password Reset Question and Answer. This can be used at a later time if you forget your password. 8. Enter your e-mail address. You will receive e-mail notification when new information is available in [address]. 9. Click Sign Up. You can now view and download portions of your medical record. 10. Click the Download Summary menu link to download a portable copy of your medical information. If you have questions, please visit the Frequently Asked Questions section of the menschmaschine publishingt website. Remember, [employer] is NOT to be used for urgent needs. For medical emergencies, dial 911. Now available from your iPhone and Android! Introducing Yoseph Calvin As a [facility] patient, I wanted to make you aware of our electronic visit tool called Yoseph Calvin. [email or web address]/[employer] allows you to connect within minutes with a medical provider 24 hours a day, seven days a week via a mobile device or tablet or logging into a secure website from your computer. You can access Yoseph Calvin from anywhere in the [place]. A virtual visit might be right for you when you have a simple condition and feel like you just dont want to get out of bed, or cant get away from work for an appointment, when your regular [facility] provider is not available (evenings, weekends or holidays), or when youre out of town and need minor care. Electronic visits cost only $49 and if the [employer] provider determines a prescription is needed to treat your condition, one can be electronically transmitted to a nearby pharmacy*. Please take a moment to enroll today if you have not already done so. The enrollment process is free and takes just a few minutes. To enroll, please download the TotSpot aldo to your tablet or phone, or visit [email or web address] to enroll on your computer. And, as an [address] [place] [address] patient with a PredPol account, the results of your visits will be scanned into your electronic medical record and your primary care provider will be able to view the scanned results. We urge you to continue to see your regular [facility] provider for your ongoing medical care.   And while your primary care provider may not be the one available when you seek a Yoseph Calvin virtual visit, the peace of mind you get from getting a real diagnosis real time can be priceless. For more information on Yoseph Calvin, view our Frequently Asked Questions (FAQs) at [email or web address]. Sincerely, 
 
Jluis Ward MD 
Chief Medical Officer [employer] *:  certain medications cannot be prescribed via Yoseph Calvin Providers Seen During Your Hospitalization Provider Specialty Primary office phone Matthew Adhikari MD Gastroenterology [phone] Your Primary Care Physician (PCP) Primary Care Physician Office Phone Office Fax [employer] [phone] You are allergic to the following Allergen Reactions Promethazine Anxiety Hydrocortisone Other (comments) GLAUCOMA Recent Documentation Height Weight BMI OB Status Smoking Status 1.6 m 79.8 kg 31.18 kg/m2 Hysterectomy Former Smoker Emergency Contacts Name Discharge Info Relation Home Work Mobile Misa Donnelly 48 CAREGIVER [3] Child [2] [phone] Patient Belongings The following personal items are in your possession at time of discharge: 
  Dental Appliances: Uppers, Partials  Visual Aid: None Please provide this summary of care documentation to your next provider. Signatures-by signing, you are acknowledging that this After Visit Summary has been reviewed with you and you have received a copy. Patient Signature:  ____________________________________________________________ Date:  ____________________________________________________________  
  
Ousmane Pappas Provider Signature:  ____________________________________________________________ Date:  ____________________________________________________________

## 2018-07-30 NOTE — PROCEDURES
Tae Fleming [address]  PROCEDURE NOTE    Claudell Fortes  MR#: [id number]  : 1940  ACCOUNT #: [de-identified]   DATE OF SERVICE: 2018    PROCEDURE PERFORMED:  Endoscopic retrograde cholangiopancreatography. PREOPERATIVE DIAGNOSIS:  History of choledocholithiasis with stent placement. POSTOPERATIVE DIAGNOSIS:  1. Stent removal.  2.  Incomplete filling of the biliary tree. 3.  stone and sludge removed from the bile duct. 4.  Complex duodenal diverticula. DESCRIPTION OF PROCEDURE:  After obtaining informed consent, the patient underwent general anesthesia (see separately dictated report). Under direct visualization, the duodenoscope was advanced through the esophagus, stomach and duodenum without difficulty. Ampulla was found approached between to fairly deep diverticula and the stent was protruding. It was snared and removed on the end of the endoscope and the endoscope PROCEDURE:  Endoscopic retrograde cholangiopancreatography. PREOPERATIVE DIAGNOSIS:  History of choledocholithiasis with stent placement. POSTOPERATIVE DIAGNOSES:  1. Stent removal.  2.  Incomplete filling of the biliary tree. 3.  Stone and sludge removed from the bile duct. 4.  Complex duodenal diverticula. DESCRIPTION OF PROCEDURE:  After obtaining informed consent, the patient underwent general anesthesia (see separately dictated report). Under direct visualization, the duodenoscope was advanced through the esophagus, stomach and duodenum without difficulty. The ampulla was found perched between 2 fairly deep diverticula, and the stent was protruding. It was snared and removed on the end of the endoscope, and the endoscope was placed. Cannulation with 0.05 wire through a cannulatome-44, was relatively easily accomplished, and outline of a somewhat distorted distal biliary tree was afforded.   I did not attempt to completely fill the bile duct, but was able to exchange over a wire for a 15-18 mm balloon. The balloon engaged and removed a stone, which was fairly soft, and there was significant debris. I then replaced that with a 12-15 mm balloon and was able to pull the 12 mm balloon fully inflated through the orifice without noticeable trauma. Thereafter, we lost access and I was unable to re-cannulate with a wire, cannulatome or balloon. It seemed to coil distally in the region of the diverticula, and since there was good drainage of bile, the procedure was terminated. I will evaluate with LFTs as an outpatient, imaging with ultrasound and MRCP and clinical progress to see if there is need to repeat ERCP.       MD Amanda Barr / MYA  D: 07/30/2018 12:44     T: 07/30/2018 12:58  JOB #: [id number]  CC: Marielos Zhang MD

## 2018-07-30 NOTE — H&P
Chief complaint/HPI and PMH:  Please refer to outpatient note below or attached to the chart. Today's exam: LUNGS:  Clear and equal. 
COR:  RRR without murmurs heard. NEURO:  A and Oriented fully. I have thoroughly explained the preparation, procedure and sedation process to the patient as well as the risks and alternatives. They will sign informed consent prior to the procedure.    
 
 
Adriana Dooley MD

## 2018-07-30 NOTE — ANESTHESIA POSTPROCEDURE EVALUATION
Post-Anesthesia Evaluation and Assessment Patient: Miranda Linda MRN: [id number]  SSN: [id number] YOB: 1940  Age: 69 y.o. Sex: female Cardiovascular Function/Vital Signs Visit Vitals  /67  Pulse 61  Temp 36.2 °C (97.2 °F)  Resp 16  
 Ht 5' 3\" (1.6 m)  Wt 79.8 kg (176 lb)  SpO2 94%  BMI 31.18 kg/m2 Patient is status post general anesthesia for Procedure(s): ENDOSCOPIC RETROGRADE CHOLANGIOPANCREATOGRAPHY (ERCP)  BMI 29 ENDOSCOPY WITH PROSTHESIS OR STENT REMOVAL 
ENDOSCOPIC STONE EXTRACTION/BALLOON SWEEP. Nausea/Vomiting: mild Postoperative hydration reviewed and adequate. Pain: 
Pain Scale 1: Numeric (0 - 10) (07/30/18 1327) Pain Intensity 1: 0 (07/30/18 1327) Managed Neurological Status:  
Neuro (WDL): Within Defined Limits (07/30/18 1327) At baseline Mental Status and Level of Consciousness: Alert and oriented Pulmonary Status:  
O2 Device: Room air (07/30/18 1327) Adequate oxygenation and airway patent Complications related to anesthesia: None Post-anesthesia assessment completed. No concerns Signed By: Nelly Ford MD   
 July 30, 2018

## 2018-08-24 ENCOUNTER — HOSPITAL ENCOUNTER (OUTPATIENT)
Dept: LAB | Age: 78
Discharge: HOME | End: 2018-08-24
Payer: MEDICARE

## 2018-08-24 LAB
ALBUMIN SERPL-MCNC: 3.9 G/DL (ref 3.2–4.6)
ALBUMIN/GLOB SERPL: 0.9 {RATIO}
ALP SERPL-CCNC: 40 U/L (ref 50–136)
ALT SERPL-CCNC: 13 U/L (ref 12–65)
AST SERPL-CCNC: 17 U/L (ref 15–37)
BILIRUB DIRECT SERPL-MCNC: 0.2 MG/DL
BILIRUB SERPL-MCNC: 0.6 MG/DL (ref 0.2–1.1)
GLOBULIN SER CALC-MCNC: 4.2 G/DL
PROT SERPL-MCNC: 8.1 G/DL (ref 6.3–8.2)

## 2018-08-24 PROCEDURE — 80076 HEPATIC FUNCTION PANEL: CPT

## 2018-08-24 PROCEDURE — 36415 COLL VENOUS BLD VENIPUNCTURE: CPT

## 2019-09-10 ENCOUNTER — ANESTHESIA EVENT (OUTPATIENT)
Dept: ENDOSCOPY | Age: 79
End: 2019-09-10
Payer: MEDICARE

## 2019-09-11 ENCOUNTER — HOSPITAL ENCOUNTER (OUTPATIENT)
Age: 79
Setting detail: OUTPATIENT SURGERY
Discharge: HOME | End: 2019-09-11
Attending: INTERNAL MEDICINE | Admitting: INTERNAL MEDICINE
Payer: MEDICARE

## 2019-09-11 ENCOUNTER — APPOINTMENT (OUTPATIENT)
Dept: GENERAL RADIOLOGY | Age: 79
End: 2019-09-11
Attending: INTERNAL MEDICINE
Payer: MEDICARE

## 2019-09-11 ENCOUNTER — ANESTHESIA (OUTPATIENT)
Dept: ENDOSCOPY | Age: 79
End: 2019-09-11
Payer: MEDICARE

## 2019-09-11 VITALS
RESPIRATION RATE: 13 BRPM | HEIGHT: 63 IN | WEIGHT: 175 LBS | SYSTOLIC BLOOD PRESSURE: 161 MMHG | HEART RATE: 62 BPM | TEMPERATURE: 97.6 F | DIASTOLIC BLOOD PRESSURE: 68 MMHG | BODY MASS INDEX: 31.01 KG/M2 | OXYGEN SATURATION: 94 %

## 2019-09-11 PROCEDURE — 77030018378 HC SPHNTOM BILI AUTOTOM BSC -C: Performed by: INTERNAL MEDICINE

## 2019-09-11 PROCEDURE — 74011250636 HC RX REV CODE- 250/636

## 2019-09-11 PROCEDURE — 76040000026: Performed by: INTERNAL MEDICINE

## 2019-09-11 PROCEDURE — 77030037088 HC TUBE ENDOTRACH ORAL NSL COVD-A: Performed by: ANESTHESIOLOGY

## 2019-09-11 PROCEDURE — 77030039425 HC BLD LARYNG TRULITE DISP TELE -A: Performed by: ANESTHESIOLOGY

## 2019-09-11 PROCEDURE — 74011636320 HC RX REV CODE- 636/320: Performed by: INTERNAL MEDICINE

## 2019-09-11 PROCEDURE — 74011000250 HC RX REV CODE- 250

## 2019-09-11 PROCEDURE — 74330 X-RAY BILE/PANC ENDOSCOPY: CPT

## 2019-09-11 PROCEDURE — C1769 GUIDE WIRE: HCPCS | Performed by: INTERNAL MEDICINE

## 2019-09-11 PROCEDURE — 77030007288 HC DEV LOK BILI BSC -A: Performed by: INTERNAL MEDICINE

## 2019-09-11 PROCEDURE — 77030009038 HC CATH BILI STN RTVR BSC -C: Performed by: INTERNAL MEDICINE

## 2019-09-11 PROCEDURE — 74011250636 HC RX REV CODE- 250/636: Performed by: ANESTHESIOLOGY

## 2019-09-11 PROCEDURE — 77030040361 HC SLV COMPR DVT MDII -B: Performed by: INTERNAL MEDICINE

## 2019-09-11 PROCEDURE — 76060000033 HC ANESTHESIA 1 TO 1.5 HR: Performed by: INTERNAL MEDICINE

## 2019-09-11 RX ORDER — SODIUM CHLORIDE, SODIUM LACTATE, POTASSIUM CHLORIDE, CALCIUM CHLORIDE 600; 310; 30; 20 MG/100ML; MG/100ML; MG/100ML; MG/100ML
100 INJECTION, SOLUTION INTRAVENOUS CONTINUOUS
Status: DISCONTINUED | OUTPATIENT
Start: 2019-09-11 | End: 2019-09-11 | Stop reason: HOSPADM

## 2019-09-11 RX ORDER — HALOPERIDOL 5 MG/ML
1 INJECTION INTRAMUSCULAR ONCE
Status: COMPLETED | OUTPATIENT
Start: 2019-09-11 | End: 2019-09-11

## 2019-09-11 RX ORDER — FENTANYL CITRATE 50 UG/ML
INJECTION, SOLUTION INTRAMUSCULAR; INTRAVENOUS AS NEEDED
Status: DISCONTINUED | OUTPATIENT
Start: 2019-09-11 | End: 2019-09-11 | Stop reason: HOSPADM

## 2019-09-11 RX ORDER — PROPOFOL 10 MG/ML
INJECTION, EMULSION INTRAVENOUS AS NEEDED
Status: DISCONTINUED | OUTPATIENT
Start: 2019-09-11 | End: 2019-09-11 | Stop reason: HOSPADM

## 2019-09-11 RX ORDER — LIDOCAINE HYDROCHLORIDE 20 MG/ML
INJECTION, SOLUTION EPIDURAL; INFILTRATION; INTRACAUDAL; PERINEURAL AS NEEDED
Status: DISCONTINUED | OUTPATIENT
Start: 2019-09-11 | End: 2019-09-11 | Stop reason: HOSPADM

## 2019-09-11 RX ORDER — ROCURONIUM BROMIDE 10 MG/ML
INJECTION, SOLUTION INTRAVENOUS AS NEEDED
Status: DISCONTINUED | OUTPATIENT
Start: 2019-09-11 | End: 2019-09-11

## 2019-09-11 RX ORDER — SUCCINYLCHOLINE CHLORIDE 20 MG/ML
INJECTION INTRAMUSCULAR; INTRAVENOUS AS NEEDED
Status: DISCONTINUED | OUTPATIENT
Start: 2019-09-11 | End: 2019-09-11 | Stop reason: HOSPADM

## 2019-09-11 RX ORDER — ONDANSETRON 2 MG/ML
INJECTION INTRAMUSCULAR; INTRAVENOUS AS NEEDED
Status: DISCONTINUED | OUTPATIENT
Start: 2019-09-11 | End: 2019-09-11 | Stop reason: HOSPADM

## 2019-09-11 RX ORDER — EPHEDRINE SULFATE 50 MG/ML
INJECTION, SOLUTION INTRAVENOUS AS NEEDED
Status: DISCONTINUED | OUTPATIENT
Start: 2019-09-11 | End: 2019-09-11 | Stop reason: HOSPADM

## 2019-09-11 RX ADMIN — IOPAMIDOL 8 ML: 755 INJECTION, SOLUTION INTRAVENOUS at 16:00

## 2019-09-11 RX ADMIN — SODIUM CHLORIDE, SODIUM LACTATE, POTASSIUM CHLORIDE, AND CALCIUM CHLORIDE 100 ML/HR: 600; 310; 30; 20 INJECTION, SOLUTION INTRAVENOUS at 14:11

## 2019-09-11 RX ADMIN — HALOPERIDOL LACTATE 1 MG: 5 INJECTION INTRAMUSCULAR at 16:11

## 2019-09-11 RX ADMIN — ONDANSETRON 4 MG: 2 INJECTION INTRAMUSCULAR; INTRAVENOUS at 15:11

## 2019-09-11 RX ADMIN — LIDOCAINE HYDROCHLORIDE 100 MG: 20 INJECTION, SOLUTION EPIDURAL; INFILTRATION; INTRACAUDAL; PERINEURAL at 14:32

## 2019-09-11 RX ADMIN — EPHEDRINE SULFATE 10 MG: 50 INJECTION, SOLUTION INTRAVENOUS at 14:43

## 2019-09-11 RX ADMIN — SUCCINYLCHOLINE CHLORIDE 100 MG: 20 INJECTION INTRAMUSCULAR; INTRAVENOUS at 14:32

## 2019-09-11 RX ADMIN — EPHEDRINE SULFATE 10 MG: 50 INJECTION, SOLUTION INTRAVENOUS at 14:50

## 2019-09-11 RX ADMIN — PROPOFOL 170 MG: 10 INJECTION, EMULSION INTRAVENOUS at 14:32

## 2019-09-11 RX ADMIN — FENTANYL CITRATE 25 MCG: 50 INJECTION, SOLUTION INTRAMUSCULAR; INTRAVENOUS at 15:30

## 2019-09-11 RX ADMIN — FENTANYL CITRATE 50 MCG: 50 INJECTION, SOLUTION INTRAMUSCULAR; INTRAVENOUS at 14:32

## 2019-09-11 RX ADMIN — FENTANYL CITRATE 25 MCG: 50 INJECTION, SOLUTION INTRAMUSCULAR; INTRAVENOUS at 15:27

## 2019-09-11 NOTE — ANESTHESIA POSTPROCEDURE EVALUATION
Procedure(s):  ENDOSCOPIC RETROGRADE CHOLANGIOPANCREATOGRAPHY (ERCP)/BMI 32  ENDOSCOPIC STONE EXTRACTION/BALLOON SWEEP  ENDOSCOPIC SPHINCTEROTOMY. general    Anesthesia Post Evaluation      Multimodal analgesia: multimodal analgesia used between 6 hours prior to anesthesia start to PACU discharge  Patient location during evaluation: PACU  Patient participation: complete - patient participated  Level of consciousness: awake  Pain management: adequate  Airway patency: patent  Anesthetic complications: no  Cardiovascular status: acceptable and hemodynamically stable  Respiratory status: acceptable  Hydration status: acceptable  Comments: Acceptable for discharge from PACU. Post anesthesia nausea and vomiting:  none      Vitals Value Taken Time   /68 9/11/2019  4:41 PM   Temp 36.4 °C (97.6 °F) 9/11/2019  4:15 PM   Pulse 67 9/11/2019  4:42 PM   Resp 13 9/11/2019  4:15 PM   SpO2 90 % 9/11/2019  4:42 PM   Vitals shown include unvalidated device data.

## 2019-09-11 NOTE — PROGRESS NOTES
PACU called to let them know procedure is starting. Will call back when procedure is complete. SCDS on patient at this time.

## 2019-09-11 NOTE — ANESTHESIA PREPROCEDURE EVALUATION
Anesthetic History   No history of anesthetic complications            Review of Systems / Medical History  Patient summary reviewed and pertinent labs reviewed    Pulmonary                Comments: Former smoker    Neuro/Psych             Comments: Glaucoma Cardiovascular    Hypertension: well controlled          Hyperlipidemia    Exercise tolerance: >4 METS: Denied chest pain, SOB, syncope   Comments: Echo 2016 - normal LV function, mild aortic stenosis    GI/Hepatic/Renal     GERD: well controlled      PUD     Endo/Other    Diabetes    Obesity, arthritis and cancer (H/O breast CA)    Comments: \"Borderline DM\", on no medications for DM Other Findings   Comments:                      Physical Exam    Airway  Mallampati: II  TM Distance: 4 - 6 cm  Neck ROM: normal range of motion   Mouth opening: Normal     Cardiovascular    Rhythm: regular  Rate: normal    Murmur, Aortic area     Dental    Dentition: Full upper dentures and Lower partial plate     Pulmonary  Breath sounds clear to auscultation               Abdominal         Other Findings            Anesthetic Plan    ASA: 3  Anesthesia type: general  ETT        Induction: Intravenous  Anesthetic plan and risks discussed with: Patient

## 2019-09-11 NOTE — H&P
Chief complaint/HPI and PMH:  Please refer to outpatient note below or attached to the chart. Today's exam:  LUNGS:  Clear and equal.  COR:  RRR without murmurs heard. NEURO:  A and Oriented fully. I have thoroughly explained the preparation, procedure and sedation process to the patient as well as the risks and alternatives. They will sign informed consent prior to the procedure. Jolene Sheikh MD    >      Patient:   Masha Asencio  YOB: 1940   Date:                        08/26/2019 1:00 PM   Visit Type:                  Office Visit  Provider:   JUAN CARLOS Simpson MD  Primary Care Provider: Chhaya Khalil MD [facility]      This 67year old female presents for Abdominal pain. History of Present Illness:  1. Abdominal pain   This is a patient of Dr. Crystal Giordano seen urgently to evaluate right sided abdominal pain. About 2 days ago, she had a severe episode of pain in the right lower chest. This pain is identical to what she has experienced in the past with common bile duct stones. Onset was sudden, and it lasted about 30 minutes. It did radiate into the right upper part of the abdomen. It was associated with nausea but not with jaundice, dark urine, itching, or fever. It ached for about a day afterwards and is feeling somewhat better today. Past Medical/Surgical History:   (Detailed)  Disease/disorder Onset Date Management Date Comments   Cancer, breast    CPF 08/26/2019 -   Colon polyps       Peptic ulcer disease       Hypertension         Procedure History  Test Date Results Interp   COLONOSCOPY 07/26/2017 Benign neoplasm of ascending colon, Diverticulosis of colon without diverticulitis, Tortuous colon    EGD 06/12/2017 Normal EGD      Family History:  (Detailed)    Social History:  (Detailed)  Tobacco use reviewed. Preferred language is [place]. MARITAL STATUS/FAMILY/SOCIAL SUPPORT  Currently . Smoking status: Former smoker.     SMOKING STATUS  Type Smoking Status Usage Per Day Years Used Total Pack Years    Former smoker        ALCOHOL  There is no history of alcohol use. CAFFEINE  The patient uses caffeine. HOME ENVIRONMENT/SAFETY  The patient has not fallen in the last year. Current Medications:  Medication Sig Description   amlodipine 5 mg tablet take 1 Tablet by oral route  every day   Celebrex 200 mg capsule take 1 capsule by oral route  every day as needed   Lyrica 25 mg capsule take 1 capsule by oral route 2 times every day   Norco 5 mg-325 mg tablet take 1 tablet by oral route  every 6 hours as needed for pain   ondansetron 4 mg disintegrating tablet take 1 by oral route 4 times every day   pantoprazole 40 mg tablet,delayed release take 1 tablet by oral route 2 times every day     Allergies:  Ingredient Reaction (Severity) Medication Name Comment   PROMETHAZINE HCL RESTLESS LEGS Phenergan RESTLESS LEG & FEELS LIKE BUGS CRAWLING IN PT   Reviewed, no changes. Review of Systems:  System Neg/Pos Details   Constitutional Negative Fatigue, Fever and Weight loss. ENMT Negative Hearing deficit, Hoarseness and Sleep apnea. Eyes Negative Vision changes. Respiratory Negative Cough and Dyspnea. Cardio Negative Chest pain and Irregular heartbeat/palpitations. GI Positive Diarrhea, Heartburn, Reflux. GI Negative Abdominal pain, Constipation, Dysphagia, Jaundice, Nausea, Rectal bleeding and Vomiting. GI Comments As per HPI.  Negative Dysuria, Urinary difficulty and Urinary frequency. Endocrine Negative Cold intolerance, Heat intolerance and Weight gain. Neuro Negative Confusion/disorientation, Dizziness, Headache and Seizures. Psych Negative Anxiety, Depression and Panic attacks. Integumentary Negative Itching skin and Rash. MS Positive Joint pain, Myalgia. Hema/Lymph Negative Anemia and Easy bleeding. Reproductive Negative Breast lumps.      Vital Signs:  BP mm/Hg Pulse Resp Pulse Ox Temp F Ht (Total in.) Weight (lbs.) Weight (oz.) BMI   140/70 69 16  97.50 63.00 178.40  31.60     Physical Exam:  General: Obese woman in no distress, weight increased by 11 pounds over 2 years  Skin: No jaundice  Heart: Regular rate and rhythm  Lungs: Clear bilaterally  Abdomen: Soft, nontender, nondistended, normal bowel sounds    Medications Prescribed/Renewed (this encounter):  Medication Directions   Norco 5 mg-325 mg tablet take 1 tablet by oral route  every 6 hours as needed for pain   ondansetron 4 mg disintegrating tablet take 1 by oral route 4 times every day     Assessment/Plan:  # Detail Type Description    1. Assessment RUQ abdominal pain (R10.11). Impression She has a history of recurrent common bile duct stones, most recently about a year ago. When she was last evaluated for stones, her liver tests were normal. I will repeat these at this time, but normal liver tests will not rule out stones. If the liver tests were significantly abnormal, I would simply proceed to ERCP because the high likelihood of stone. If the liver tests are normal, we will obtain MRCP. I am prescribing Zofran and Norco to take in case symptoms return. .    Plan Orders CBC With Diff to be performed and Liver Panel to be performed. Further evaluations ordered today include MRI Abdomen W/O Dye to be performed, Next Lab Date is on 08/29/2019. Fall Risk Plan:  The patient has not fallen in the last year. The patient was checked out at 1:31 PM by Micheal Licona. Fickling. Elements of this note may have been dictated using speech recognition software. As a result, errors of speech recognition may have occurred.       Provider:   Lola Anderson  08/27/2019 10:09 AM   Document generated by: Nola Locke MD 08/27/2019    CC Providers:  Guillermo Singh MD, MD  [facility] [address]  [place],  [address]-      Electronically signed by Nola Locke MD on 08/27/2019 10:09 AM

## 2019-09-11 NOTE — DISCHARGE INSTRUCTIONS
Avoid all NSAIDs and blood thinners for 1 week period low fat diet today and report any significant abdominal pain, fever, jaundice    I will see her back in the office in the next few weeks, repeat LFTs and discuss our plan going forward for recurrent biliary stone disease    Endoscopic Retrograde Cholangiopancreatogram (ERCP): What to Expect at [address] [place] [address]  After you have an endoscopic retrograde cholangiopancreatogram (ERCP). You will be able to go home after your doctor or a nurse checks to make sure you are not having any problems. If you stay in the hospital overnight, you may go home the next day. You may have a sore throat for a day or two after the procedure. This care sheet gives you a general idea about how long it will take for you to recover. But each person recovers at a different pace. Follow the steps below to get better as quickly as possible. How can you care for yourself at home? Activity  · Rest as much as you need to after you go home. · You should be able to go back to your usual activities the day after the procedure. Diet  · Follow your doctor's directions for eating after the procedure. · Drink plenty of fluids (unless your doctor tells you not to). Medicines  · If you have a sore throat the next day, use an over-the-counter spray to numb your throat. Follow-up care is a key part of your treatment and safety. Be sure to make and go to all appointments, and call your doctor if you are having problems. Its also a good idea to know your test results and keep a list of the medicines you take. When should you call for help? Call 911 anytime you think you may need emergency care. For example, call if:  · You vomit blood or what looks like coffee grounds. · You pass maroon or bloody stools. Call your doctor now or go to the emergency room if:  · You have trouble swallowing. · You have belly pain. · Your stools are black and tarlike or have streaks of blood.   · You are sick to your stomach and cannot drink fluids. · You have a fever. · You have pain that does not get better after you take your pain medicine. Watch closely for changes in your health, and be sure to contact your doctor if:  · Your throat still hurts after a day or two. You do not get better as expected. After general anesthesia or intravenous sedation, for 24 hours or while taking prescription Narcotics:  · Limit your activities  · A responsible adult needs to be with you for the next 24 hours  · Do not drive and operate hazardous machinery  · Do not make important personal or business decisions  · Do  not drink alcoholic beverages  · If you have not urinated within 8 hours after discharge, please contact your surgeon on call. · If you have sleep apnea and have a CPAP machine, please use it for all naps and sleeping. · Please use caution when taking narcotics and any of your home medications that may cause drowsiness. *  Please give a list of your current medications to your Primary Care Provider. *  Please update this list whenever your medications are discontinued, doses are      changed, or new medications (including over-the-counter products) are added. *  Please carry medication information at all times in case of emergency situations. These are general instructions for a healthy lifestyle:  No smoking/ No tobacco products/ Avoid exposure to second hand smoke  Surgeon General's Warning:  Quitting smoking now greatly reduces serious risk to your health.   Obesity, smoking, and sedentary lifestyle greatly increases your risk for illness  A healthy diet, regular physical exercise & weight monitoring are important for maintaining a healthy lifestyle

## 2019-09-11 NOTE — PROCEDURES
Endoscopic Retrograde CholangioPancreatography  (ERCP) Procedure Note    Procedure: ERCP With papillotomy, stone removal     Pre-operative Diagnosis: Characteristic right upper quadrant pain  Abnormal MRCP with filling defects in the common bile duct      Post-operative Diagnosis: Choledocholithiasis  Complex Ellaree Goad ampullary duodenal diverticulum  Extension of previous papillotomy  Stone resection with 12-15 mm balloon     Recommendations:  Avoid all NSAIDs and blood thinners for 1 week period low fat diet today and report any significant abdominal pain, fever, jaundice. Follow up:   I will see her back in the office in the next few weeks, repeat LFTs and discuss our plan going forward for recurrent biliary stone disease. Anesthesia/Sedation:  MAC, (see separate report). Procedure Details:  Informed consent was obtained for the procedure, including sedation. Risks of perforation, hemorrhage, pancreatitis, risks of general anesthesia, adverse drug reaction and aspiration were discussed. Based on the pre-procedure assessment, including review of the patient's medical history, medications, allergies, and review of systems, she had been deemed to be an appropriate candidate for anesthesia. Please refer to the anesthesia record for details and dosages of medications. The esophagus was intubated with direct visualization. The esophagus, stomach and duodenum were traversed to the full extent of the endoscope. Retroflexed views of the cardia and fundus were performed. Incomplete visualization was afforded by the duodenoscope. Findings:   ESOPHAGUS:  The esophagus was not well visualized with this endoscope but mucosa that was seen was normal.    STOMACH: The gastric pouch inflated and deflated normally. The mucosal surface and gastric rugae were normal without obvious abnormalities. DUODENUM:  The ampulla was in usual anatomic location.       ERCP:   The ampulla was located on a ridge situated between to deep seated duodenal diverticula. Cannulation proved difficult with a 0.035 wire through the balloon. I switched for a 44 cannulatome and was able to selectively cannulate only after injecting the common bile duct and outlining a tortuous distal duct and grossly distended, bile duct up to 2 cm in size. The tortuosity of the distal duct resolved enough to allow passage of the wire after injection. There were 3 filling defects that appeared to be the cuboid stones about 1 cm in size. I exchanged the cannula tome for a 15-18 mm balloon. I was able to engage the stones but not Carlos A  the balloon through the tortuous distal duct. Had a similar experience with the 12-15 mm balloon and therefore I used a cannula tome to extend the sphincterotomy. I was then able to engage the distal most stone and removed that with a 12 mm balloon slightly deflated. Using the 15 mm balloon I would not engage each of the neck stones, deflate 212 mm in the distal common bile duct and retrieved sludge. This was repeated multiple times. A final cholangiogram with the balloon inflated outlined a cystic duct remnant, the biliary tree without filling defects. The only question is of the possibility of compressed stone in the distal common bile duct. There was free flow of contrast at the end of the procedure. EBL: 0 cc's. Pathology speciment:  None. Complications: No apparent complications and the patient tolerated sedation and the procedure well. Attending Attestation: I performed the procedure.     Vincent Flowers MD

## 2019-09-11 NOTE — PROGRESS NOTES
Pt called to arrive earlier than scheduled time. Pt instructed to by here at 12:45. Pt agreed. Will inform Dr. Patience Buckner.

## 2020-07-08 ENCOUNTER — HOSPITAL ENCOUNTER (OUTPATIENT)
Dept: GENERAL RADIOLOGY | Age: 80
Discharge: HOME | End: 2020-07-08

## 2020-07-08 DIAGNOSIS — M54.41 LOW BACK PAIN WITH RIGHT-SIDED SCIATICA: ICD-10-CM

## 2020-10-25 ENCOUNTER — HOSPITAL ENCOUNTER (EMERGENCY)
Age: 80
Discharge: HOME | End: 2020-10-25
Attending: EMERGENCY MEDICINE
Payer: MEDICARE

## 2020-10-25 VITALS
SYSTOLIC BLOOD PRESSURE: 153 MMHG | HEART RATE: 58 BPM | BODY MASS INDEX: 31.01 KG/M2 | HEIGHT: 63 IN | OXYGEN SATURATION: 98 % | TEMPERATURE: 98.1 F | RESPIRATION RATE: 33 BRPM | WEIGHT: 175 LBS | DIASTOLIC BLOOD PRESSURE: 64 MMHG

## 2020-10-25 DIAGNOSIS — D64.9 ANEMIA, UNSPECIFIED TYPE: ICD-10-CM

## 2020-10-25 DIAGNOSIS — K92.2 UPPER GI BLEED: Primary | ICD-10-CM

## 2020-10-25 LAB
ABO + RH BLD: NORMAL
ALBUMIN SERPL-MCNC: 3.6 G/DL (ref 3.2–4.6)
ALBUMIN/GLOB SERPL: 0.9 {RATIO} (ref 1.2–3.5)
ALP SERPL-CCNC: 39 U/L (ref 50–136)
ALT SERPL-CCNC: 18 U/L (ref 12–65)
ANION GAP SERPL CALC-SCNC: 7 MMOL/L (ref 7–16)
AST SERPL-CCNC: 19 U/L (ref 15–37)
BASOPHILS # BLD: 0 K/UL (ref 0–0.2)
BASOPHILS NFR BLD: 0 % (ref 0–2)
BILIRUB SERPL-MCNC: 0.3 MG/DL (ref 0.2–1.1)
BLOOD GROUP ANTIBODIES SERPL: NORMAL
BUN SERPL-MCNC: 16 MG/DL (ref 8–23)
CALCIUM SERPL-MCNC: 8.6 MG/DL (ref 8.3–10.4)
CHLORIDE SERPL-SCNC: 111 MMOL/L (ref 98–107)
CO2 SERPL-SCNC: 25 MMOL/L (ref 21–32)
CREAT SERPL-MCNC: 1.2 MG/DL (ref 0.6–1)
DIFFERENTIAL METHOD BLD: ABNORMAL
EOSINOPHIL # BLD: 0.1 K/UL (ref 0–0.8)
EOSINOPHIL NFR BLD: 2 % (ref 0.5–7.8)
ERYTHROCYTE [DISTWIDTH] IN BLOOD BY AUTOMATED COUNT: 16.8 % (ref 11.9–14.6)
GLOBULIN SER CALC-MCNC: 4 G/DL (ref 2.3–3.5)
GLUCOSE SERPL-MCNC: 102 MG/DL (ref 65–100)
HCT VFR BLD AUTO: 26.5 % (ref 35.8–46.3)
HCT VFR BLD AUTO: 26.8 % (ref 35.8–46.3)
HGB BLD-MCNC: 8.4 G/DL (ref 11.7–15.4)
HGB BLD-MCNC: 8.7 G/DL (ref 11.7–15.4)
IMM GRANULOCYTES # BLD AUTO: 0 K/UL (ref 0–0.5)
IMM GRANULOCYTES NFR BLD AUTO: 0 % (ref 0–5)
LYMPHOCYTES # BLD: 1.8 K/UL (ref 0.5–4.6)
LYMPHOCYTES NFR BLD: 31 % (ref 13–44)
MCH RBC QN AUTO: 28.9 PG (ref 26.1–32.9)
MCHC RBC AUTO-ENTMCNC: 32.5 G/DL (ref 31.4–35)
MCV RBC AUTO: 89 FL (ref 79.6–97.8)
MONOCYTES # BLD: 0.4 K/UL (ref 0.1–1.3)
MONOCYTES NFR BLD: 7 % (ref 4–12)
NEUTS SEG # BLD: 3.5 K/UL (ref 1.7–8.2)
NEUTS SEG NFR BLD: 60 % (ref 43–78)
NRBC # BLD: 0 K/UL (ref 0–0.2)
PLATELET # BLD AUTO: 207 K/UL (ref 150–450)
PMV BLD AUTO: 10.1 FL (ref 9.4–12.3)
POTASSIUM SERPL-SCNC: 3.6 MMOL/L (ref 3.5–5.1)
PROT SERPL-MCNC: 7.6 G/DL (ref 6.3–8.2)
RBC # BLD AUTO: 3.01 M/UL (ref 4.05–5.2)
SODIUM SERPL-SCNC: 143 MMOL/L (ref 136–145)
SPECIMEN EXP DATE BLD: NORMAL
WBC # BLD AUTO: 5.8 K/UL (ref 4.3–11.1)

## 2020-10-25 PROCEDURE — 85018 HEMOGLOBIN: CPT

## 2020-10-25 PROCEDURE — 74011250636 HC RX REV CODE- 250/636: Performed by: EMERGENCY MEDICINE

## 2020-10-25 PROCEDURE — 86900 BLOOD TYPING SEROLOGIC ABO: CPT

## 2020-10-25 PROCEDURE — 99284 EMERGENCY DEPT VISIT MOD MDM: CPT

## 2020-10-25 PROCEDURE — 80053 COMPREHEN METABOLIC PANEL: CPT

## 2020-10-25 PROCEDURE — 96374 THER/PROPH/DIAG INJ IV PUSH: CPT

## 2020-10-25 PROCEDURE — C9113 INJ PANTOPRAZOLE SODIUM, VIA: HCPCS | Performed by: EMERGENCY MEDICINE

## 2020-10-25 PROCEDURE — 74011000250 HC RX REV CODE- 250: Performed by: EMERGENCY MEDICINE

## 2020-10-25 PROCEDURE — 85025 COMPLETE CBC W/AUTO DIFF WBC: CPT

## 2020-10-25 RX ORDER — SUCRALFATE 1 G/1
TABLET ORAL
Qty: 60 TAB | Refills: 0 | Status: SHIPPED | OUTPATIENT
Start: 2020-10-25

## 2020-10-25 RX ORDER — PANTOPRAZOLE SODIUM 40 MG/1
40 TABLET, DELAYED RELEASE ORAL 2 TIMES DAILY
Qty: 60 TAB | Refills: 0 | Status: SHIPPED | OUTPATIENT
Start: 2020-10-25 | End: 2020-11-24

## 2020-10-25 RX ADMIN — SODIUM CHLORIDE 80 MG: 9 INJECTION, SOLUTION INTRAMUSCULAR; INTRAVENOUS; SUBCUTANEOUS at 11:58

## 2020-10-25 NOTE — ED PROVIDER NOTES
[facility]       HPI:    44-year-old female presents to the ED with dark stools and lightheadedness and malaise for the past week. No hematemesis. No bright red blood per rectum. She reports a distant history of PUD but no prior EGD. Patient states she has had an unremarkable colonoscopy within the past 3 to 4 years. She does not drink alcohol. She does not take NSAIDs to excess. Denies chest pain or dyspnea. No syncope. Patient is not on anticoagulants. She denies taking any Pepto-Bismol or sucralfate or other medications foods which might cause dark stools.     REVIEW OF SYSTEMS     ROS Negative Except as Listed in HPI    PAST MEDICAL HISTORY     Past Medical History:   Diagnosis Date    [facility]    [facility]    Adverse effect of anesthesia     had to ramsey anesthesia after one procedure    Arthritis     OA- generalized    Backache, unspecified 8/24/2015    Bladder stone 9/18/14    Cancer ([facility] [address]     left breast 1976 - mastectomy ,colon polyp-removed- no further tx    Chronic pain     right side of abdomin    CVA, old, dysphagia 8/24/2015    Degeneration of intervertebral disc, site unspecified 8/24/2015    Diabetes ([facility] [address]     takes no meds Pre diabetic    Dyslipidemia     atorvastatin hs    GERD (gastroesophageal reflux disease)     controlled with meds     Hyperlipidemia 8/24/2015    Hypertension     controlled with meds     Hypoglycemia     Osteoporosis 8/24/2015    Other allied disorders of spine 8/24/2015    Pain in joint, pelvic region and thigh 8/24/2015    Pain in limb 8/24/2015    Personal history of malignant neoplasm of breast 8/24/2015    Plantar fasciitis     Primary open-angle glaucoma(365.11) 8/24/2015    PUD (peptic ulcer disease)     hx of 1980s    Senile nuclear cataract 8/24/2015    Spinal stenosis, lumbar region, without neurogenic claudication 8/24/2015    Ulcer     Unspecified adverse effect of anesthesia slow to wake up after EGD and reports given a shot to reverse and caused pt to be very jumpy and \"could not be still for the rest of the day\"     Past Surgical History:   Procedure Laterality Date    BREAST SURGERY PROCEDURE UNLISTED Left     bilateral mastectomy and reconstruction    FRAGMENT KIDNEY STONE/ ESWL      HX BREAST BIOPSY Right     multiple and lumpectomy- benign    HX BREAST RECONSTRUCTION      HX BREAST RECONSTRUCTION      HX CATARACT REMOVAL Left 14,     HX CATARACT REMOVAL Right 14    HX CHOLECYSTECTOMY  1970s    HX ERCP N/A 2016    [facility] Dr. Nimo Ceballos    HX GI      EGD- multiple    HX HYSTERECTOMY  1974    HX KNEE ARTHROSCOPY Left 2010    HX MASTECTOMY Left     HX OOPHORECTOMY      HX OTHER SURGICAL  2009    EGD    HX POLYPECTOMY      HX SALPINGO-OOPHORECTOMY Bilateral     HX TOTAL ABDOMINAL HYSTERECTOMY      HX UROLOGICAL      cysto and stone extraction     Social History     Socioeconomic History    Marital status:      Spouse name: Not on file    Number of children: Not on file    Years of education: Not on file    Highest education level: Not on file   Tobacco Use    Smoking status: Former Smoker     Packs/day: 1.00     Years: 30.00     Pack years: 30.00     Last attempt to quit: 1997     Years since quittin.8    Smokeless tobacco: Never Used   Substance and Sexual Activity    Alcohol use: No     Alcohol/week: 0.0 standard drinks    Drug use: No    Sexual activity: Never   Other Topics Concern     Service No    Blood Transfusions Yes    Caffeine Concern No    Occupational Exposure No    Hobby Hazards No    Sleep Concern No    Stress Concern No    Weight Concern Yes    Special Diet No    Exercise No    Seat Belt Yes     Prior to Admission Medications   Prescriptions Last Dose Informant Patient Reported? Taking? amLODIPine (NORVASC) 5 mg tablet   Yes No   Sig: Take 5 mg by mouth nightly. pantoprazole (PROTONIX) 40 mg tablet   Yes No   Sig: Take 40 mg by mouth daily as needed. pregabalin (LYRICA PO)   Yes No   Sig: Take  by mouth daily. Facility-Administered Medications: None     Allergies   Allergen Reactions    Promethazine Anxiety    Hydrocortisone Other (comments)     GLAUCOMA          PHYSICAL EXAM       Vitals:    10/25/20 1107   BP: (!) 152/62   Pulse: 68   Resp: 18   Temp: 98.1 °F (36.7 °C)   SpO2: 98%    Vital signs were reviewed. Physical Exam  Vitals signs and nursing note reviewed. Constitutional:       General: She is not in acute distress. Appearance: She is not ill-appearing, toxic-appearing or diaphoretic. HENT:      Mouth/Throat:      Mouth: Mucous membranes are moist.   Eyes:      General: No scleral icterus. Comments: Conjunctival pallor noted   Cardiovascular:      Rate and Rhythm: Normal rate and regular rhythm. Pulmonary:      Effort: Pulmonary effort is normal.      Breath sounds: Normal breath sounds. Abdominal:      Palpations: Abdomen is soft. Tenderness: There is no abdominal tenderness. Genitourinary:     Comments: External exam: No hemorrhoids noted. No induration or fluctuance noted. Digital rectal exam: No masses, strictures or excessive tenderness to palpation. No fecal impaction. Soft dark stools in rectal vault. Stools are guaiac positive. Controls appropriate on POC testing card. Musculoskeletal:      Comments: Neck and back grossly unremarkable. No acute gross extremity abnormalities noted. Skin:     General: Skin is warm and dry. Neurological:      Comments: Awake, alert. GCS 15. CN II-XII grossly intact. Speech clear. No gross lateralizing neuro deficits.     Psychiatric:         Behavior: Behavior normal.          MEDICAL DECISION MAKING       Initial Impression and Treatment Plan  Nontoxic-appearing 52-year-old female with reported symptoms of upper GI bleeding for the past week with generalized malaise and weakness. She has dark melanotic guaiac positive stools on exam today. She is hypertensive but otherwise benign vital signs. She is not on anticoagulants. Basic labs, type and screen, IV Protonix. Reassess. CBC: WBC 5.8, H&H 8.7 26.8, platelets 395    CMP: Sodium 143, potassium 3.6. Chloride 111, bicarb 25, BUN 16, creatinine 1.20, glucose 102, calcium 8.6, T bili 0.3, AST 19, ALT 18, alk phos 39, albumin 3.6    CBC #2: H&H 8.4/26.5      No results found for this or any previous visit (from the past 8 hour(s)). No results found. Medications - No data to display    Update notes  1:26 PM-reviewed labs. Patient has had anemia which actually. Roughly stable based on prior labs, at least from 2016 which is the most recent lab to have available when H&H was 9.5/31.1 and November 2016. Will get second H&H and reassess. 2:37 PM-slight drop in hemoglobin from 8.7-8.4. Patient lucio hemodynamically stable. Will discuss case with gastroenterology. 2:42 PM-discussed case with Dr Elvi Lay. Given that the patient remains hemodynamically stable and only had slight decrease in H&H, feels patient be discharged and follow-up tomorrow in walk-in clinic. Discussed with the patient and family at bedside. They state they are comfortable with plan of care and the return precautions I gave for them. Procedures        Disposition:    DC    Diagnosis:     ICD-10-CM ICD-9-CM   1. Upper GI bleed  K92.2 578.9   2. Anemia, unspecified type  D64.9 285.9         __________________________________________________________      Please note, this chart was dictated using Dragon dictation, voice recognition software. While efforts were made to correct any transcription errors, some may inadvertently remain. Please forgive punctuation and typographic/voice recognition errors. Please contact me with any questions concerns or for clarification of documentation.

## 2020-10-25 NOTE — DISCHARGE INSTRUCTIONS
[facility] tomorrow morning for a recheck and notify the staff that you were told he needed to be put in the [facility] clinic tomorrow    Return to the ER for worsening bleeding, worsening lightheadedness, episodes of passing out, or other symptoms that concern you

## 2020-10-25 NOTE — ED NOTES
I have reviewed discharge instructions with the patient. The patient verbalized understanding. Patient left ED via Discharge Method: ambulatory to Home with daughter. .    Opportunity for questions and clarification provided. Patient given 2 scripts. To continue your aftercare when you leave the hospital, you may receive an automated call from our care team to check in on how you are doing. This is a free service and part of our promise to provide the best care and service to meet your aftercare needs.  If you have questions, or wish to unsubscribe from this service please call [phone]. Thank you for Choosing our [facility] Emergency Department.

## 2020-10-27 ENCOUNTER — ANESTHESIA (OUTPATIENT)
Dept: ENDOSCOPY | Age: 80
End: 2020-10-27
Payer: MEDICARE

## 2020-10-27 ENCOUNTER — ANESTHESIA EVENT (OUTPATIENT)
Dept: ENDOSCOPY | Age: 80
End: 2020-10-27
Payer: MEDICARE

## 2020-10-27 ENCOUNTER — HOSPITAL ENCOUNTER (OUTPATIENT)
Age: 80
Setting detail: OUTPATIENT SURGERY
Discharge: HOME | End: 2020-10-27
Attending: INTERNAL MEDICINE | Admitting: INTERNAL MEDICINE
Payer: MEDICARE

## 2020-10-27 VITALS
SYSTOLIC BLOOD PRESSURE: 160 MMHG | TEMPERATURE: 98.5 F | BODY MASS INDEX: 30.12 KG/M2 | WEIGHT: 170 LBS | HEART RATE: 55 BPM | RESPIRATION RATE: 16 BRPM | OXYGEN SATURATION: 100 % | DIASTOLIC BLOOD PRESSURE: 86 MMHG | HEIGHT: 63 IN

## 2020-10-27 LAB
ATRIAL RATE: 74 BPM
CALCULATED P AXIS, ECG09: 64 DEGREES
CALCULATED R AXIS, ECG10: -63 DEGREES
CALCULATED T AXIS, ECG11: 97 DEGREES
DIAGNOSIS, 93000: NORMAL
P-R INTERVAL, ECG05: 248 MS
Q-T INTERVAL, ECG07: 460 MS
QRS DURATION, ECG06: 178 MS
QTC CALCULATION (BEZET), ECG08: 510 MS
VENTRICULAR RATE, ECG03: 74 BPM

## 2020-10-27 PROCEDURE — 77030021593 HC FCPS BIOP ENDOSC BSC -A: Performed by: INTERNAL MEDICINE

## 2020-10-27 PROCEDURE — 74011250636 HC RX REV CODE- 250/636: Performed by: NURSE ANESTHETIST, CERTIFIED REGISTERED

## 2020-10-27 PROCEDURE — 2709999900 HC NON-CHARGEABLE SUPPLY: Performed by: INTERNAL MEDICINE

## 2020-10-27 PROCEDURE — 88312 SPECIAL STAINS GROUP 1: CPT

## 2020-10-27 PROCEDURE — 76040000025: Performed by: INTERNAL MEDICINE

## 2020-10-27 PROCEDURE — 87635 SARS-COV-2 COVID-19 AMP PRB: CPT

## 2020-10-27 PROCEDURE — 74011250636 HC RX REV CODE- 250/636: Performed by: INTERNAL MEDICINE

## 2020-10-27 PROCEDURE — 88305 TISSUE EXAM BY PATHOLOGIST: CPT

## 2020-10-27 PROCEDURE — 76060000031 HC ANESTHESIA FIRST 0.5 HR: Performed by: INTERNAL MEDICINE

## 2020-10-27 PROCEDURE — 93005 ELECTROCARDIOGRAM TRACING: CPT | Performed by: STUDENT IN AN ORGANIZED HEALTH CARE EDUCATION/TRAINING PROGRAM

## 2020-10-27 RX ORDER — SODIUM CHLORIDE, SODIUM LACTATE, POTASSIUM CHLORIDE, CALCIUM CHLORIDE 600; 310; 30; 20 MG/100ML; MG/100ML; MG/100ML; MG/100ML
INJECTION, SOLUTION INTRAVENOUS
Status: DISCONTINUED | OUTPATIENT
Start: 2020-10-27 | End: 2020-10-27 | Stop reason: HOSPADM

## 2020-10-27 RX ORDER — SODIUM CHLORIDE, SODIUM LACTATE, POTASSIUM CHLORIDE, CALCIUM CHLORIDE 600; 310; 30; 20 MG/100ML; MG/100ML; MG/100ML; MG/100ML
1000 INJECTION, SOLUTION INTRAVENOUS CONTINUOUS
Status: DISCONTINUED | OUTPATIENT
Start: 2020-10-27 | End: 2020-10-27 | Stop reason: HOSPADM

## 2020-10-27 RX ORDER — PROPOFOL 10 MG/ML
INJECTION, EMULSION INTRAVENOUS AS NEEDED
Status: DISCONTINUED | OUTPATIENT
Start: 2020-10-27 | End: 2020-10-27 | Stop reason: HOSPADM

## 2020-10-27 RX ADMIN — PROPOFOL 30 MG: 10 INJECTION, EMULSION INTRAVENOUS at 14:14

## 2020-10-27 RX ADMIN — SODIUM CHLORIDE, SODIUM LACTATE, POTASSIUM CHLORIDE, AND CALCIUM CHLORIDE: 600; 310; 30; 20 INJECTION, SOLUTION INTRAVENOUS at 14:03

## 2020-10-27 RX ADMIN — PROPOFOL 50 MG: 10 INJECTION, EMULSION INTRAVENOUS at 14:08

## 2020-10-27 RX ADMIN — SODIUM CHLORIDE, SODIUM LACTATE, POTASSIUM CHLORIDE, AND CALCIUM CHLORIDE 1000 ML: 600; 310; 30; 20 INJECTION, SOLUTION INTRAVENOUS at 14:00

## 2020-10-27 RX ADMIN — PROPOFOL 30 MG: 10 INJECTION, EMULSION INTRAVENOUS at 14:11

## 2020-10-27 RX ADMIN — PROPOFOL 30 MG: 10 INJECTION, EMULSION INTRAVENOUS at 14:17

## 2020-10-27 NOTE — DISCHARGE INSTRUCTIONS
Gastrointestinal Esophagogastroduodenoscopy (EGD) - Upper Exam Discharge Instructions    1. Call Dr. Willis Joseph at [phone] for any problems or questions. 2. Contact the doctor's office for follow up appointment as directed. 3. Medication may cause drowsiness for several hours, therefore:  · Do not drive or operate machinery for remainder of the day. · No alcohol today. · Do not make any important or legal decisions for 24 hours. · Do not sign any legal documents for 24 hours. 5. Ordinarily, you may resume regular diet and activity after exam unless otherwise              specified by your physician. 6. For mild soreness in your throat you may use Cepacol throat lozenges or warm               salt-water gargles as needed. Any additional instructions:    1. Follow up with pathology. 2. Avoid NSAID'S (Aleve, Aspirin, Naproxen, Ibuprofen). 3. Decrease dose of daily protonix. Instructions given to Caity Rebollar and other family members.

## 2020-10-27 NOTE — ANESTHESIA PREPROCEDURE EVALUATION
Anesthetic History   No history of anesthetic complications            Review of Systems / Medical History  Patient summary reviewed and pertinent labs reviewed    Pulmonary  Within defined limits            Pertinent negatives: No smoker  Comments: Former smoker    Neuro/Psych   Within defined limits          Comments: Glaucoma Cardiovascular    Hypertension: well controlled          Hyperlipidemia    Exercise tolerance: >4 METS: Denied chest pain, SOB, syncope   Comments: Echo 2016 - normal LV function, mild aortic stenosis    GI/Hepatic/Renal     GERD: well controlled      PUD     Endo/Other    Diabetes    Obesity, arthritis, cancer (H/O breast CA) and anemia    Comments: \"Borderline DM\", on no medications for DM Other Findings   Comments:                      Physical Exam    Airway  Mallampati: II  TM Distance: 4 - 6 cm  Neck ROM: normal range of motion   Mouth opening: Normal     Cardiovascular    Rhythm: regular  Rate: normal         Dental    Dentition: Full upper dentures and Lower partial plate     Pulmonary  Breath sounds clear to auscultation               Abdominal         Other Findings            Anesthetic Plan    ASA: 3  Anesthesia type: total IV anesthesia          Induction: Intravenous  Anesthetic plan and risks discussed with: Patient      Patient sent from outpatient 2/2 concern for BBB. EKG obtained here. Chronic LBBB. Slightly prolonged QT and NC. Patient has been asymptomatic from CV perspective and has a moderate functional capacity. There is still concern for bleeding so I consider this case urgent and ok to proceed.

## 2020-10-27 NOTE — PROCEDURES
EGD Procedure Note    PreOp Diagnosis:   Epigastric abdominal pain, \  melena,   acute blood loss anemia     PostOp Diagnosis:  Atrophic gastritis   Irregular Z line   No evidence of upper GI bleeding     Medications:  Monitored Anesthesia    Procedure:  EGD   Instrument:   After informed consent was obtained, the patient was sedated and the endoscope was inserted  in the mouth and advanced into the duodenum without difficulty. The scope was slowly withdrawn while the mucosa was carefully inspected, including a retroflexed view of the cardia and fundus. Findings:   OROPHARYNX: The piriform sinuses, arytenoid cartilage and vocal cords were briefly evaluated on entry and withdrawal of the endoscope through the oropharynx. These were found to be unremarkable. ESOPHAGUS: The proximal and mid esophagus appeared normal.  In the distal esophagus, the Z-line was irregular, suggesting a short segment of Ruiz's esophagus versus chronic reflux changes. There were no masses, strictures, or ulcers present. Multiple biopsies were obtained to evaluate for intestinal metaplasia. STOMACH: The gastric mucosa was Diffusely atrophic, mildly Erythematous , consistent with gastritis. There were no erosions, ulcerations, polyps, mass lesions, vascular ectasias or other abnormalities noted. Biopsies were obtained from the antrum and body of the stomach to rule out H. pylori infection. The pylorus was patent and easily intubated. There was no hiatal hernia noted by direct view or retroflexion within the stomach. DUODENUM: The endoscope was advanced as far as possible into the duodenum. The villi appeared normal.  There were no mucosal breaks, erosions, ulcerations, vascular ectasias or other abnormalities present. Recommendations:   Follow up pathology results  Avoid NSAIDs   Continue Protonix once daily   Office followup with Dr. Haroon Infante in 1-2 months     Maria Luisa Christopher MD

## 2020-10-27 NOTE — PROGRESS NOTES
Pt coming from [facility]. Dr. Susana Clark contacted stating patient needed to have procedure performed in [facility] due to cardiac history. Dr. Lilo Mccarthy and Dr. Horacio Robertson informed. Per Dr. Horacio Robertson patient will need STAT EKG and approval for rapid COVID test given.   Associates stated that patient is en route to hospital.

## 2020-10-27 NOTE — H&P
Gastroenterology Outpatient History and Physical    Patient: Melissa Lilly    Physician: Jaime Taylor MD    Chief Complaint: Blood loss anemia    History of Present Illness: R/O PUD    Justification for Procedure: As above    History:  Past Medical History:   Diagnosis Date    Acute pancreatitis 09/2016    [facility]    Adverse effect of anesthesia     had to ramsey anesthesia after one procedure    Arthritis     OA- generalized    Backache, unspecified 8/24/2015    Bladder stone 9/18/14    Cancer ([facility] [address]     left breast 1976 - mastectomy ,colon polyp-removed- no further tx    Chronic pain     right side of abdomin    CVA, old, dysphagia 8/24/2015    Degeneration of intervertebral disc, site unspecified 8/24/2015    Diabetes ([facility] [address]     takes no meds Pre diabetic    Dyslipidemia     atorvastatin hs    GERD (gastroesophageal reflux disease)     controlled with meds     Hyperlipidemia 8/24/2015    Hypertension     controlled with meds     Hypoglycemia     Osteoporosis 8/24/2015    Other allied disorders of spine 8/24/2015    Pain in joint, pelvic region and thigh 8/24/2015    Pain in limb 8/24/2015    Personal history of malignant neoplasm of breast 8/24/2015    Plantar fasciitis     Primary open-angle glaucoma(365.11) 8/24/2015    PUD (peptic ulcer disease)     hx of 1980s    Senile nuclear cataract 8/24/2015    Spinal stenosis, lumbar region, without neurogenic claudication 8/24/2015    Ulcer     Unspecified adverse effect of anesthesia     slow to wake up after EGD and reports given a shot to reverse and caused pt to be very jumpy and \"could not be still for the rest of the day\"      Past Surgical History:   Procedure Laterality Date    BREAST SURGERY PROCEDURE UNLISTED Left 1976    bilateral mastectomy and reconstruction    FRAGMENT KIDNEY STONE/ ESWL      HX BREAST BIOPSY Right     multiple and lumpectomy- benign    HX BREAST RECONSTRUCTION  1981    HX BREAST RECONSTRUCTION 1981    HX CATARACT REMOVAL Left 14,     HX CATARACT REMOVAL Right 14    HX CHOLECYSTECTOMY  1970s    HX ERCP N/A 2016    S Dr. Flor Anton    HX GI      EGD- multiple    HX HYSTERECTOMY  1974    HX KNEE ARTHROSCOPY Left 2010    HX MASTECTOMY Left     HX OOPHORECTOMY      HX OTHER SURGICAL  2009    EGD    HX POLYPECTOMY  1990    HX SALPINGO-OOPHORECTOMY Bilateral     HX TOTAL ABDOMINAL HYSTERECTOMY      HX UROLOGICAL      cysto and stone extraction      Social History     Socioeconomic History    Marital status:      Spouse name: Not on file    Number of children: Not on file    Years of education: Not on file    Highest education level: Not on file   Tobacco Use    Smoking status: Former Smoker     Packs/day: 1.00     Years: 30.00     Pack years: 30.00     Last attempt to quit: 1997     Years since quittin.8    Smokeless tobacco: Never Used   Substance and Sexual Activity    Alcohol use: No     Alcohol/week: 0.0 standard drinks    Drug use: No    Sexual activity: Never   Other Topics Concern     Service No    Blood Transfusions Yes    Caffeine Concern No    Occupational Exposure No    Hobby Hazards No    Sleep Concern No    Stress Concern No    Weight Concern Yes    Special Diet No    Exercise No    Seat Belt Yes      Family History   Problem Relation Age of Onset   [facility] Stroke Mother     Hypertension Mother     Diabetes Mother    [facility] Stroke Father     Hypertension Father     Diabetes Sister     Diabetes Sister     Diabetes Sister     Cancer Sister         leukemia    Cancer Maternal Aunt        Allergies: Allergies   Allergen Reactions    Promethazine Anxiety    Hydrocortisone Other (comments)     GLAUCOMA         Medications:   Prior to Admission medications    Medication Sig Start Date End Date Taking? Authorizing Provider   pantoprazole (Protonix) 40 mg tablet Take 1 Tab by mouth two (2) times a day for 30 days.  10/25/20 11/24/20 Yes Ernie Prabhakar MD   sucralfate (Carafate) 1 gram tablet 1 tablet dissolved in warm water by mouth before meals and before bedtime 10/25/20  Yes Ernie Prabhakar MD   pregabalin (LYRICA PO) Take  by mouth daily. Yes Provider, Historical   amLODIPine (NORVASC) 5 mg tablet Take 5 mg by mouth nightly. Yes Provider, Historical       Vital Signs: Blood pressure (!) 164/72, pulse 90, temperature 98.2 °F (36.8 °C), resp. rate 16, height 5' 3\" (1.6 m), weight 77.1 kg (170 lb), SpO2 98 %.     Physical Exam:   General: alert      Heart: regular rate and rhythm   Lungs: no tachypnea, retractions or cyanosis, Heart exam - S1, S2 normal, no murmur, no gallop, rate regular   Abdominal: Bowel sounds are normal, soft, non distended             Plan of Care/Planned Procedure: EGD    Signed:  Andrea Morris MD 10/27/2020

## 2020-10-27 NOTE — PROGRESS NOTES
Lab contacted for update on rapid COVID test, Rose Espino from lab stated test was negative, will be updating in computer shortly.

## 2020-10-28 LAB
COVID-19 RAPID TEST, COVR: NOT DETECTED
SARS COV-2, XPGCVT: NEGATIVE
SOURCE, COVRS: NORMAL

## 2020-10-28 NOTE — ANESTHESIA POSTPROCEDURE EVALUATION
Procedure(s):  ESOPHAGOGASTRODUODENOSCOPY (EGD) *Will need EKG and Rapid test prior to procedure  ESOPHAGOGASTRODUODENAL (EGD) BIOPSY. total IV anesthesia    Anesthesia Post Evaluation      Multimodal analgesia: multimodal analgesia used between 6 hours prior to anesthesia start to PACU discharge  Patient location during evaluation: bedside  Patient participation: complete - patient participated  Level of consciousness: awake and alert  Pain management: adequate  Airway patency: patent  Anesthetic complications: no  Cardiovascular status: acceptable  Respiratory status: acceptable  Hydration status: acceptable  Post anesthesia nausea and vomiting:  controlled  Final Post Anesthesia Temperature Assessment:  Normothermia (36.0-37.5 degrees C)      INITIAL Post-op Vital signs:   Vitals Value Taken Time   /71 10/27/2020  2:55 PM   Temp 36.9 °C (98.5 °F) 10/27/2020  2:25 PM   Pulse 56 10/27/2020  2:56 PM   Resp 16 10/27/2020  2:50 PM   SpO2 96 % 10/27/2020  2:56 PM   Vitals shown include unvalidated device data.

## 2021-02-01 ENCOUNTER — ANESTHESIA EVENT (OUTPATIENT)
Dept: ENDOSCOPY | Age: 81
End: 2021-02-01
Payer: MEDICARE

## 2021-02-01 NOTE — PROGRESS NOTES
Informed patient of 0800 arrival time for a 0930 procedure. Also informed patient of COVID protocol and that a ride would need to stay with her the entire time. Patient verbalized understanding.

## 2021-02-02 ENCOUNTER — APPOINTMENT (OUTPATIENT)
Dept: GENERAL RADIOLOGY | Age: 81
End: 2021-02-02
Attending: INTERNAL MEDICINE
Payer: MEDICARE

## 2021-02-02 ENCOUNTER — ANESTHESIA (OUTPATIENT)
Dept: ENDOSCOPY | Age: 81
End: 2021-02-02
Payer: MEDICARE

## 2021-02-02 ENCOUNTER — HOSPITAL ENCOUNTER (OUTPATIENT)
Age: 81
Setting detail: OUTPATIENT SURGERY
Discharge: HOME | End: 2021-02-02
Attending: INTERNAL MEDICINE | Admitting: INTERNAL MEDICINE
Payer: MEDICARE

## 2021-02-02 VITALS
DIASTOLIC BLOOD PRESSURE: 64 MMHG | HEART RATE: 59 BPM | SYSTOLIC BLOOD PRESSURE: 141 MMHG | OXYGEN SATURATION: 98 % | RESPIRATION RATE: 14 BRPM | TEMPERATURE: 97.3 F

## 2021-02-02 PROCEDURE — 77030021593 HC FCPS BIOP ENDOSC BSC -A: Performed by: INTERNAL MEDICINE

## 2021-02-02 PROCEDURE — 74011000250 HC RX REV CODE- 250: Performed by: NURSE ANESTHETIST, CERTIFIED REGISTERED

## 2021-02-02 PROCEDURE — 2709999900 HC NON-CHARGEABLE SUPPLY: Performed by: INTERNAL MEDICINE

## 2021-02-02 PROCEDURE — 77030039425 HC BLD LARYNG TRULITE DISP TELE -A: Performed by: ANESTHESIOLOGY

## 2021-02-02 PROCEDURE — 76060000033 HC ANESTHESIA 1 TO 1.5 HR: Performed by: INTERNAL MEDICINE

## 2021-02-02 PROCEDURE — 74011250636 HC RX REV CODE- 250/636: Performed by: ANESTHESIOLOGY

## 2021-02-02 PROCEDURE — 76040000027: Performed by: INTERNAL MEDICINE

## 2021-02-02 PROCEDURE — 74011250636 HC RX REV CODE- 250/636: Performed by: NURSE ANESTHETIST, CERTIFIED REGISTERED

## 2021-02-02 PROCEDURE — 77030040361 HC SLV COMPR DVT MDII -B: Performed by: INTERNAL MEDICINE

## 2021-02-02 PROCEDURE — 74330 X-RAY BILE/PANC ENDOSCOPY: CPT

## 2021-02-02 PROCEDURE — 77030007288 HC DEV LOK BILI BSC -A: Performed by: INTERNAL MEDICINE

## 2021-02-02 PROCEDURE — 77030009038 HC CATH BILI STN RTVR BSC -C: Performed by: INTERNAL MEDICINE

## 2021-02-02 PROCEDURE — 77030037088 HC TUBE ENDOTRACH ORAL NSL COVD-A: Performed by: ANESTHESIOLOGY

## 2021-02-02 PROCEDURE — C2625 STENT, NON-COR, TEM W/DEL SY: HCPCS | Performed by: INTERNAL MEDICINE

## 2021-02-02 PROCEDURE — 77030012595 HC SPHNTOM BILI BSC -D: Performed by: INTERNAL MEDICINE

## 2021-02-02 DEVICE — BILIARY STENT WITH RX DELIVERY SYSTEM
Type: IMPLANTABLE DEVICE | Site: BILE DUCT | Status: FUNCTIONAL
Brand: RX BILIARY

## 2021-02-02 RX ORDER — ROCURONIUM BROMIDE 10 MG/ML
INJECTION, SOLUTION INTRAVENOUS AS NEEDED
Status: DISCONTINUED | OUTPATIENT
Start: 2021-02-02 | End: 2021-02-02 | Stop reason: HOSPADM

## 2021-02-02 RX ORDER — LIDOCAINE HYDROCHLORIDE 20 MG/ML
INJECTION, SOLUTION EPIDURAL; INFILTRATION; INTRACAUDAL; PERINEURAL AS NEEDED
Status: DISCONTINUED | OUTPATIENT
Start: 2021-02-02 | End: 2021-02-02 | Stop reason: HOSPADM

## 2021-02-02 RX ORDER — NALOXONE HYDROCHLORIDE 0.4 MG/ML
0.1 INJECTION, SOLUTION INTRAMUSCULAR; INTRAVENOUS; SUBCUTANEOUS AS NEEDED
Status: DISCONTINUED | OUTPATIENT
Start: 2021-02-02 | End: 2021-02-02 | Stop reason: HOSPADM

## 2021-02-02 RX ORDER — LIDOCAINE HYDROCHLORIDE 10 MG/ML
0.1 INJECTION INFILTRATION; PERINEURAL AS NEEDED
Status: DISCONTINUED | OUTPATIENT
Start: 2021-02-02 | End: 2021-02-02 | Stop reason: HOSPADM

## 2021-02-02 RX ORDER — GLYCOPYRROLATE 0.2 MG/ML
INJECTION INTRAMUSCULAR; INTRAVENOUS AS NEEDED
Status: DISCONTINUED | OUTPATIENT
Start: 2021-02-02 | End: 2021-02-02 | Stop reason: HOSPADM

## 2021-02-02 RX ORDER — HYDROMORPHONE HYDROCHLORIDE 2 MG/ML
0.5 INJECTION, SOLUTION INTRAMUSCULAR; INTRAVENOUS; SUBCUTANEOUS
Status: DISCONTINUED | OUTPATIENT
Start: 2021-02-02 | End: 2021-02-02 | Stop reason: HOSPADM

## 2021-02-02 RX ORDER — MIDAZOLAM HYDROCHLORIDE 1 MG/ML
2 INJECTION, SOLUTION INTRAMUSCULAR; INTRAVENOUS
Status: DISCONTINUED | OUTPATIENT
Start: 2021-02-02 | End: 2021-02-02 | Stop reason: HOSPADM

## 2021-02-02 RX ORDER — FENTANYL CITRATE 50 UG/ML
INJECTION, SOLUTION INTRAMUSCULAR; INTRAVENOUS AS NEEDED
Status: DISCONTINUED | OUTPATIENT
Start: 2021-02-02 | End: 2021-02-02 | Stop reason: HOSPADM

## 2021-02-02 RX ORDER — FENTANYL CITRATE 50 UG/ML
100 INJECTION, SOLUTION INTRAMUSCULAR; INTRAVENOUS AS NEEDED
Status: DISCONTINUED | OUTPATIENT
Start: 2021-02-02 | End: 2021-02-02 | Stop reason: HOSPADM

## 2021-02-02 RX ORDER — ONDANSETRON 2 MG/ML
4 INJECTION INTRAMUSCULAR; INTRAVENOUS ONCE
Status: DISCONTINUED | OUTPATIENT
Start: 2021-02-02 | End: 2021-02-02 | Stop reason: HOSPADM

## 2021-02-02 RX ORDER — ONDANSETRON 2 MG/ML
INJECTION INTRAMUSCULAR; INTRAVENOUS AS NEEDED
Status: DISCONTINUED | OUTPATIENT
Start: 2021-02-02 | End: 2021-02-02 | Stop reason: HOSPADM

## 2021-02-02 RX ORDER — PROPOFOL 10 MG/ML
INJECTION, EMULSION INTRAVENOUS AS NEEDED
Status: DISCONTINUED | OUTPATIENT
Start: 2021-02-02 | End: 2021-02-02 | Stop reason: HOSPADM

## 2021-02-02 RX ORDER — SODIUM CHLORIDE, SODIUM LACTATE, POTASSIUM CHLORIDE, CALCIUM CHLORIDE 600; 310; 30; 20 MG/100ML; MG/100ML; MG/100ML; MG/100ML
1000 INJECTION, SOLUTION INTRAVENOUS CONTINUOUS
Status: DISCONTINUED | OUTPATIENT
Start: 2021-02-02 | End: 2021-02-02 | Stop reason: HOSPADM

## 2021-02-02 RX ORDER — DEXAMETHASONE SODIUM PHOSPHATE 4 MG/ML
INJECTION, SOLUTION INTRA-ARTICULAR; INTRALESIONAL; INTRAMUSCULAR; INTRAVENOUS; SOFT TISSUE AS NEEDED
Status: DISCONTINUED | OUTPATIENT
Start: 2021-02-02 | End: 2021-02-02 | Stop reason: HOSPADM

## 2021-02-02 RX ORDER — NEOSTIGMINE METHYLSULFATE 1 MG/ML
INJECTION, SOLUTION INTRAVENOUS AS NEEDED
Status: DISCONTINUED | OUTPATIENT
Start: 2021-02-02 | End: 2021-02-02 | Stop reason: HOSPADM

## 2021-02-02 RX ORDER — EPHEDRINE SULFATE/0.9% NACL/PF 50 MG/5 ML
SYRINGE (ML) INTRAVENOUS AS NEEDED
Status: DISCONTINUED | OUTPATIENT
Start: 2021-02-02 | End: 2021-02-02 | Stop reason: HOSPADM

## 2021-02-02 RX ORDER — OXYCODONE HYDROCHLORIDE 5 MG/1
5 TABLET ORAL
Status: DISCONTINUED | OUTPATIENT
Start: 2021-02-02 | End: 2021-02-02 | Stop reason: HOSPADM

## 2021-02-02 RX ORDER — DIPHENHYDRAMINE HYDROCHLORIDE 50 MG/ML
12.5 INJECTION, SOLUTION INTRAMUSCULAR; INTRAVENOUS ONCE
Status: DISCONTINUED | OUTPATIENT
Start: 2021-02-02 | End: 2021-02-02 | Stop reason: HOSPADM

## 2021-02-02 RX ORDER — OXYCODONE HYDROCHLORIDE 5 MG/1
10 TABLET ORAL
Status: DISCONTINUED | OUTPATIENT
Start: 2021-02-02 | End: 2021-02-02 | Stop reason: HOSPADM

## 2021-02-02 RX ORDER — SODIUM CHLORIDE, SODIUM LACTATE, POTASSIUM CHLORIDE, CALCIUM CHLORIDE 600; 310; 30; 20 MG/100ML; MG/100ML; MG/100ML; MG/100ML
75 INJECTION, SOLUTION INTRAVENOUS CONTINUOUS
Status: DISCONTINUED | OUTPATIENT
Start: 2021-02-02 | End: 2021-02-02 | Stop reason: HOSPADM

## 2021-02-02 RX ADMIN — PHENYLEPHRINE HYDROCHLORIDE 100 MCG: 10 INJECTION INTRAVENOUS at 10:11

## 2021-02-02 RX ADMIN — Medication 3 MG: at 10:51

## 2021-02-02 RX ADMIN — SODIUM CHLORIDE, SODIUM LACTATE, POTASSIUM CHLORIDE, AND CALCIUM CHLORIDE 1000 ML: 600; 310; 30; 20 INJECTION, SOLUTION INTRAVENOUS at 08:16

## 2021-02-02 RX ADMIN — Medication 10 MG: at 10:11

## 2021-02-02 RX ADMIN — FENTANYL CITRATE 50 MCG: 50 INJECTION INTRAMUSCULAR; INTRAVENOUS at 09:53

## 2021-02-02 RX ADMIN — ROCURONIUM BROMIDE 20 MG: 10 INJECTION, SOLUTION INTRAVENOUS at 09:53

## 2021-02-02 RX ADMIN — LIDOCAINE HYDROCHLORIDE 60 MG: 20 INJECTION, SOLUTION EPIDURAL; INFILTRATION; INTRACAUDAL; PERINEURAL at 09:53

## 2021-02-02 RX ADMIN — PROPOFOL 140 MG: 10 INJECTION, EMULSION INTRAVENOUS at 09:53

## 2021-02-02 RX ADMIN — DEXAMETHASONE SODIUM PHOSPHATE 4 MG: 4 INJECTION, SOLUTION INTRAMUSCULAR; INTRAVENOUS at 09:59

## 2021-02-02 RX ADMIN — ONDANSETRON 4 MG: 2 INJECTION INTRAMUSCULAR; INTRAVENOUS at 10:32

## 2021-02-02 RX ADMIN — GLYCOPYRROLATE 0.4 MG: 0.2 INJECTION, SOLUTION INTRAMUSCULAR; INTRAVENOUS at 10:51

## 2021-02-02 RX ADMIN — FENTANYL CITRATE 50 MCG: 50 INJECTION INTRAMUSCULAR; INTRAVENOUS at 10:40

## 2021-02-02 NOTE — PROCEDURES
Gastroenterology Procedure Note           Procedure:  Endoscopic Retrograde Cholangiopancreatogram    Date of Procedure:  2/2/2021    Patient:  Darreld Ahumada     1940    Indication:  CDL    Sedation:  General anesthesia    Pre-Procedure Physical Exam:    Mental status:  alert and oriented  Airway:  normal oropharyngeal airway and neck mobility  CV:  regular rate and rhythm  Respiratory:  clear to auscultation    Procedure:  A History and Physical has been performed, and patient medication allergies have been  reviewed. Risks of pancreatitis, perforation, hemorrhage, adverse drug reaction, and aspiration were discussed. After obtaining informed consent, the patient was sedated, intubated and placed in prone position on the fluoroscopy table. The [id number] duodenoscope was passed under indirect technique to the oropharynx and gently passed into the duodenum. Only partial views of the stomach and duodenum were obtained. After the procedure, the patient was taken to the recovery area in stable condition. Findings:     AMPULLA: Large situated between two diverticula. BILE DUCT:  The bile duct is extremely dilated ~2cm. The distal segment is tortuous due to large periampullary diverticulum. Cannulation was achieved using only the wire over a 12-15 balloon tipped catheter. Cholangiogram revealed large 2cm duct with numerous filling defects. Multiple sweeps performed with 15mm balloon which would have to be downsized to 12mm in the distal duct due to tapering as a result of extrinsic pressure from periampullary diverticulum. Numerous stones and sludge were removed. A 10 Fr x 5cm stent was placed to ensure adequate drainage as it was unclear if all stones were removed despite occlusion cholangiogram which was difficult to fill given size of CBD. Multiple images were obtained to document this. PANCREATIC DUCT: Pancreatogram was intentionally not performed.        Specimen:  No    Estimated Blood Loss:  Minimal    Implant:  10Fr x 5 cm plastic stent           Impression:  1. CDL     Plan:  1.  Repeat ERCP in 6-8 weeks     Signed:  Jackelin Salgado MD  2/2/2021  9:33 AM

## 2021-02-02 NOTE — PERIOP NOTES
Discharge instructions given to Amauri Hernandez, patient's daughter. They verbalized understanding and was given opportunity for questions.

## 2021-02-02 NOTE — H&P
History and Physical for Outpatient Procedure             Date: 2/2/2021     History of Present Illness:  Patient has history of CBD stones and presents for ERCP.      Past Medical History:   Diagnosis Date    Acute pancreatitis 09/2016    S    Arthritis     OA- generalized    Backache, unspecified 8/24/2015    Bladder stone 9/18/14    Cancer ([facility] [address]     left breast 1976 - mastectomy ,colon polyp-removed- no further tx    Chronic pain     right side of abdomin    CVA, old, dysphagia 08/24/2015    patient denies    Degeneration of intervertebral disc, site unspecified 8/24/2015    Diabetes ([facility] [address]     takes no meds Pre diabetic    Dyslipidemia     atorvastatin hs    GERD (gastroesophageal reflux disease)     controlled with meds     Hyperlipidemia 8/24/2015    Hypertension     controlled with meds     Hypoglycemia     Osteoporosis 8/24/2015    Other allied disorders of spine 8/24/2015    Pain in joint, pelvic region and thigh 8/24/2015    Pain in limb 8/24/2015    Personal history of malignant neoplasm of breast 8/24/2015    Plantar fasciitis     Primary open-angle glaucoma(365.11) 8/24/2015    PUD (peptic ulcer disease)     hx of 1980s    Senile nuclear cataract 8/24/2015    Spinal stenosis, lumbar region, without neurogenic claudication 8/24/2015    Ulcer     Unspecified adverse effect of anesthesia     slow to wake up after EGD and reports given a shot to reverse and caused pt to be very jumpy and \"could not be still for the rest of the day\"     Past Surgical History:   Procedure Laterality Date    FRAGMENT KIDNEY STONE/ ESWL      HX BREAST BIOPSY Right     multiple and lumpectomy- benign    HX BREAST RECONSTRUCTION  1981    HX BREAST RECONSTRUCTION  1981    HX CATARACT REMOVAL Left 6/23/14,     HX CATARACT REMOVAL Right 7/14/14    HX CHOLECYSTECTOMY  1970s    HX ERCP N/A 09/2016    [facility]    HX GI      EGD- multiple    HX HYSTERECTOMY  1974    HX KNEE ARTHROSCOPY Left 2010  HX MASTECTOMY Left     HX OOPHORECTOMY      HX OTHER SURGICAL  2009    EGD    HX POLYPECTOMY      HX SALPINGO-OOPHORECTOMY Bilateral     HX TOTAL ABDOMINAL HYSTERECTOMY      HX UROLOGICAL      cysto and stone extraction    NM BREAST SURGERY PROCEDURE UNLISTED Left     bilateral mastectomy and reconstruction     In and  Family History   Problem Relation Age of Onset    Stroke Mother     Hypertension Mother     Diabetes Mother    [facility] Stroke Father     Hypertension Father     Diabetes Sister     Hypertension Sister     Diabetes Sister     Hypertension Sister     Diabetes Sister     Cancer Sister         leukemia    Hypertension Sister     Cancer Maternal Aunt     Hypertension Brother      Social History     Tobacco Use    Smoking status: Former Smoker     Packs/day: 1.00     Years: 30.00     Pack years: 30.00     Quit date: 1997     Years since quittin.1    Smokeless tobacco: Never Used   Substance Use Topics    Alcohol use: No     Alcohol/week: 0.0 standard drinks        Allergies   Allergen Reactions    Promethazine Anxiety    Hydrocortisone Other (comments)     GLAUCOMA       Current Facility-Administered Medications   Medication Dose Route Frequency    lactated Ringers infusion 75 mL  75 mL IntraVENous CONTINUOUS    oxyCODONE IR (ROXICODONE) tablet 5 mg  5 mg Oral ONCE PRN    oxyCODONE IR (ROXICODONE) tablet 10 mg  10 mg Oral ONCE PRN    HYDROmorphone (PF) (DILAUDID) injection 0.5 mg  0.5 mg IntraVENous Multiple    naloxone (NARCAN) injection 0.1 mg  0.1 mg IntraVENous PRN    ondansetron (ZOFRAN) injection 4 mg  4 mg IntraVENous ONCE    diphenhydrAMINE (BENADRYL) injection 12.5 mg  12.5 mg IntraVENous ONCE    lidocaine (XYLOCAINE) 10 mg/mL (1 %) injection 0.1 mL  0.1 mL SubCUTAneous PRN    lactated Ringers infusion 1,000 mL  1,000 mL IntraVENous CONTINUOUS    lactated ringers bolus infusion 500 mL  500 mL IntraVENous ONCE    fentaNYL citrate (PF) injection 100 mcg  100 mcg IntraVENous PRN    midazolam (VERSED) injection 2 mg  2 mg IntraVENous ONCE PRN    iopamidoL (ISOVUE-370) 76 % injection 50 mL  50 mL IntraBILiary ONCE    glucagon (GLUCAGEN) injection 0.5-1 mg  0.5-1 mg IntraVENous MULTIPLE DOSE GIVEN    indomethacin (INDOCIN) rectal suppository 100 mg  100 mg Rectal ENDO ONCE        Review of Systems:  A detailed 10 organ review of systems is obtained with pertinent positives as listed in the History of Present Illness. All others are negative. Objective:     Physical Exam:  Visit Vitals  BP (!) 186/75   Pulse 75   Temp 98 °F (36.7 °C)   Resp 18   SpO2 100%      General:  Alert and oriented. Heart: Regular rate and rhythm  Lungs:  Clear to auscultation bilaterally  Abdomen: Soft, nontender, nondistended    Impression/Plan:     Proceed with ERCP with possible sphincterotomy, stent placement as planned. I have discussed with the patient the technique, benefits, alternatives, and risks of these procedures, including medication reaction, immediate or delayed bleeding, or perforation of the gastrointestinal tract.      Signed By: Roselyn Adair MD     February 2, 2021

## 2021-02-02 NOTE — ANESTHESIA PREPROCEDURE EVALUATION
Anesthetic History   No history of anesthetic complications            Review of Systems / Medical History  Patient summary reviewed and pertinent labs reviewed    Pulmonary  Within defined limits            Pertinent negatives: No smoker  Comments: Former smoker    Neuro/Psych   Within defined limits          Comments: Glaucoma Cardiovascular    Hypertension: well controlled          Hyperlipidemia    Exercise tolerance: >4 METS: Denied chest pain, SOB, syncope   Comments: Echo 2016 - normal LV function, mild aortic stenosis    GI/Hepatic/Renal     GERD: well controlled      PUD     Endo/Other    Diabetes    Obesity, arthritis, cancer (H/O breast CA) and anemia    Comments: \"Borderline DM\", on no medications for DM Other Findings   Comments:                        Physical Exam    Airway  Mallampati: II  TM Distance: 4 - 6 cm  Neck ROM: normal range of motion   Mouth opening: Normal     Cardiovascular    Rhythm: regular  Rate: normal         Dental    Dentition: Full upper dentures and Lower partial plate     Pulmonary  Breath sounds clear to auscultation               Abdominal         Other Findings            Anesthetic Plan    ASA: 3  Anesthesia type: general          Induction: Intravenous  Anesthetic plan and risks discussed with: Patient

## 2021-02-02 NOTE — DISCHARGE INSTRUCTIONS
Endoscopic Retrograde Cholangiopancreatogram (ERCP): What to Expect at [address] [facility]  After you have an endoscopic retrograde cholangiopancreatogram (ERCP). You will be able to go home after your doctor or a nurse checks to make sure you are not having any problems. If you stay in the hospital overnight, you may go home the next day. You may have a sore throat for a day or two after the procedure. This care sheet gives you a general idea about how long it will take for you to recover. But each person recovers at a different pace. Follow the steps below to get better as quickly as possible. How can you care for yourself at home? Activity  · Rest as much as you need to after you go home. · You should be able to go back to your usual activities the day after the procedure. Diet  · Follow your doctor's directions for eating after the procedure. · Drink plenty of fluids (unless your doctor tells you not to). Medicines  · If you have a sore throat the next day, use an over-the-counter spray to numb your throat. Follow-up care is a key part of your treatment and safety. Be sure to make and go to all appointments, and call your doctor if you are having problems. Its also a good idea to know your test results and keep a list of the medicines you take. When should you call for help? Call 911 anytime you think you may need emergency care. For example, call if:  · You vomit blood or what looks like coffee grounds. · You pass maroon or bloody stools. Call your doctor now or go to the emergency room if:  · You have trouble swallowing. · You have belly pain. · Your stools are black and tarlike or have streaks of blood. · You are sick to your stomach and cannot drink fluids. · You have a fever. · You have pain that does not get better after you take your pain medicine. Watch closely for changes in your health, and be sure to contact your doctor if:  · Your throat still hurts after a day or two.   You do not get better as expected. After general anesthesia or intravenous sedation, for 24 hours or while taking prescription Narcotics:  · Limit your activities  · A responsible adult needs to be with you for the next 24 hours  · Do not drive and operate hazardous machinery  · Do not make important personal or business decisions  · Do not drink alcoholic beverages  · If you have not urinated within 8 hours after discharge, and you are experiencing discomfort from urinary retention, please go to the nearest ED. · If you have sleep apnea and have a CPAP machine, please use it for all naps and sleeping. · Please use caution when taking narcotics and any of your home medications that may cause drowsiness. *  Please give a list of your current medications to your Primary Care Provider. *  Please update this list whenever your medications are discontinued, doses are      changed, or new medications (including over-the-counter products) are added. *  Please carry medication information at all times in case of emergency situations. These are general instructions for a healthy lifestyle:  No smoking/ No tobacco products/ Avoid exposure to second hand smoke  Surgeon General's Warning:  Quitting smoking now greatly reduces serious risk to your health. Obesity, smoking, and sedentary lifestyle greatly increases your risk for illness  A healthy diet, regular physical exercise & weight monitoring are important for maintaining a healthy lifestyle    You may be retaining fluid if you have a history of heart failure or if you experience any of the following symptoms:  Weight gain of 3 pounds or more overnight or 5 pounds in a week, increased swelling in our hands or feet or shortness of breath while lying flat in bed. Please call your doctor as soon as you notice any of these symptoms; do not wait until your next office visit.

## 2021-02-02 NOTE — ANESTHESIA POSTPROCEDURE EVALUATION
Procedure(s):  ENDOSCOPIC RETROGRADE CHOLANGIOPANCREATOGRAPHY (ERCP)/ BMI 30  ENDOSCOPIC STONE EXTRACTION/BALLOON SWEEP  BILIARY STENT PLACEMENT. general    Anesthesia Post Evaluation      Multimodal analgesia: multimodal analgesia used between 6 hours prior to anesthesia start to PACU discharge  Patient location during evaluation: bedside  Patient participation: complete - patient participated  Level of consciousness: responsive to verbal stimuli  Pain management: adequate  Airway patency: patent  Anesthetic complications: no  Cardiovascular status: hemodynamically stable  Respiratory status: spontaneous ventilation  Hydration status: stable    Final Post Anesthesia Temperature Assessment:  Normothermia (36.0-37.5 degrees C)      INITIAL Post-op Vital signs:   Vitals Value Taken Time   /64 02/02/21 1200   Temp 36.3 °C (97.3 °F) 02/02/21 1106   Pulse 63 02/02/21 1205   Resp 14 02/02/21 1155   SpO2 98 % 02/02/21 1205   Vitals shown include unvalidated device data.

## 2021-03-14 ENCOUNTER — ANESTHESIA EVENT (OUTPATIENT)
Dept: ENDOSCOPY | Age: 81
End: 2021-03-14
Payer: MEDICARE

## 2021-03-15 ENCOUNTER — ANESTHESIA (OUTPATIENT)
Dept: ENDOSCOPY | Age: 81
End: 2021-03-15
Payer: MEDICARE

## 2021-03-15 ENCOUNTER — APPOINTMENT (OUTPATIENT)
Dept: GENERAL RADIOLOGY | Age: 81
End: 2021-03-15
Attending: INTERNAL MEDICINE
Payer: MEDICARE

## 2021-03-15 ENCOUNTER — HOSPITAL ENCOUNTER (OUTPATIENT)
Age: 81
Setting detail: OUTPATIENT SURGERY
Discharge: HOME | End: 2021-03-15
Attending: INTERNAL MEDICINE | Admitting: INTERNAL MEDICINE
Payer: MEDICARE

## 2021-03-15 VITALS
HEART RATE: 59 BPM | TEMPERATURE: 98.4 F | DIASTOLIC BLOOD PRESSURE: 70 MMHG | SYSTOLIC BLOOD PRESSURE: 159 MMHG | RESPIRATION RATE: 15 BRPM | OXYGEN SATURATION: 97 %

## 2021-03-15 PROCEDURE — 74330 X-RAY BILE/PANC ENDOSCOPY: CPT

## 2021-03-15 PROCEDURE — 77030019908 HC STETH ESOPH SIMS -A: Performed by: ANESTHESIOLOGY

## 2021-03-15 PROCEDURE — 74011000250 HC RX REV CODE- 250: Performed by: NURSE ANESTHETIST, CERTIFIED REGISTERED

## 2021-03-15 PROCEDURE — 77030012595 HC SPHNTOM BILI BSC -D: Performed by: INTERNAL MEDICINE

## 2021-03-15 PROCEDURE — 77030037088 HC TUBE ENDOTRACH ORAL NSL COVD-A: Performed by: ANESTHESIOLOGY

## 2021-03-15 PROCEDURE — 76040000026: Performed by: INTERNAL MEDICINE

## 2021-03-15 PROCEDURE — 2709999900 HC NON-CHARGEABLE SUPPLY: Performed by: INTERNAL MEDICINE

## 2021-03-15 PROCEDURE — 74011250636 HC RX REV CODE- 250/636: Performed by: NURSE ANESTHETIST, CERTIFIED REGISTERED

## 2021-03-15 PROCEDURE — 76060000032 HC ANESTHESIA 0.5 TO 1 HR: Performed by: INTERNAL MEDICINE

## 2021-03-15 PROCEDURE — 77030039425 HC BLD LARYNG TRULITE DISP TELE -A: Performed by: ANESTHESIOLOGY

## 2021-03-15 PROCEDURE — 74011250636 HC RX REV CODE- 250/636: Performed by: ANESTHESIOLOGY

## 2021-03-15 PROCEDURE — 74011000636 HC RX REV CODE- 636: Performed by: INTERNAL MEDICINE

## 2021-03-15 PROCEDURE — 77030007288 HC DEV LOK BILI BSC -A: Performed by: INTERNAL MEDICINE

## 2021-03-15 PROCEDURE — 77030040361 HC SLV COMPR DVT MDII -B: Performed by: INTERNAL MEDICINE

## 2021-03-15 PROCEDURE — 77030013991 HC SNR POLYP ENDOSC BSC -A: Performed by: INTERNAL MEDICINE

## 2021-03-15 PROCEDURE — 77030009038 HC CATH BILI STN RTVR BSC -C: Performed by: INTERNAL MEDICINE

## 2021-03-15 RX ORDER — ALBUTEROL SULFATE 0.83 MG/ML
2.5 SOLUTION RESPIRATORY (INHALATION) AS NEEDED
Status: DISCONTINUED | OUTPATIENT
Start: 2021-03-15 | End: 2021-03-15 | Stop reason: HOSPADM

## 2021-03-15 RX ORDER — GLYCOPYRROLATE 0.2 MG/ML
INJECTION INTRAMUSCULAR; INTRAVENOUS AS NEEDED
Status: DISCONTINUED | OUTPATIENT
Start: 2021-03-15 | End: 2021-03-15 | Stop reason: HOSPADM

## 2021-03-15 RX ORDER — SUCCINYLCHOLINE CHLORIDE 20 MG/ML
INJECTION INTRAMUSCULAR; INTRAVENOUS AS NEEDED
Status: DISCONTINUED | OUTPATIENT
Start: 2021-03-15 | End: 2021-03-15 | Stop reason: HOSPADM

## 2021-03-15 RX ORDER — LIDOCAINE HYDROCHLORIDE 20 MG/ML
INJECTION, SOLUTION EPIDURAL; INFILTRATION; INTRACAUDAL; PERINEURAL AS NEEDED
Status: DISCONTINUED | OUTPATIENT
Start: 2021-03-15 | End: 2021-03-15 | Stop reason: HOSPADM

## 2021-03-15 RX ORDER — OXYCODONE HYDROCHLORIDE 5 MG/1
5 TABLET ORAL
Status: DISCONTINUED | OUTPATIENT
Start: 2021-03-15 | End: 2021-03-15 | Stop reason: HOSPADM

## 2021-03-15 RX ORDER — PROPOFOL 10 MG/ML
INJECTION, EMULSION INTRAVENOUS AS NEEDED
Status: DISCONTINUED | OUTPATIENT
Start: 2021-03-15 | End: 2021-03-15 | Stop reason: HOSPADM

## 2021-03-15 RX ORDER — SODIUM CHLORIDE, SODIUM LACTATE, POTASSIUM CHLORIDE, CALCIUM CHLORIDE 600; 310; 30; 20 MG/100ML; MG/100ML; MG/100ML; MG/100ML
100 INJECTION, SOLUTION INTRAVENOUS CONTINUOUS
Status: DISCONTINUED | OUTPATIENT
Start: 2021-03-15 | End: 2021-03-15 | Stop reason: HOSPADM

## 2021-03-15 RX ORDER — FENTANYL CITRATE 50 UG/ML
INJECTION, SOLUTION INTRAMUSCULAR; INTRAVENOUS AS NEEDED
Status: DISCONTINUED | OUTPATIENT
Start: 2021-03-15 | End: 2021-03-15 | Stop reason: HOSPADM

## 2021-03-15 RX ORDER — ROCURONIUM BROMIDE 10 MG/ML
INJECTION, SOLUTION INTRAVENOUS AS NEEDED
Status: DISCONTINUED | OUTPATIENT
Start: 2021-03-15 | End: 2021-03-15 | Stop reason: HOSPADM

## 2021-03-15 RX ORDER — DEXAMETHASONE SODIUM PHOSPHATE 4 MG/ML
INJECTION, SOLUTION INTRA-ARTICULAR; INTRALESIONAL; INTRAMUSCULAR; INTRAVENOUS; SOFT TISSUE AS NEEDED
Status: DISCONTINUED | OUTPATIENT
Start: 2021-03-15 | End: 2021-03-15 | Stop reason: HOSPADM

## 2021-03-15 RX ORDER — ONDANSETRON 2 MG/ML
INJECTION INTRAMUSCULAR; INTRAVENOUS AS NEEDED
Status: DISCONTINUED | OUTPATIENT
Start: 2021-03-15 | End: 2021-03-15 | Stop reason: HOSPADM

## 2021-03-15 RX ORDER — HYDROMORPHONE HYDROCHLORIDE 1 MG/ML
0.5 INJECTION, SOLUTION INTRAMUSCULAR; INTRAVENOUS; SUBCUTANEOUS
Status: DISCONTINUED | OUTPATIENT
Start: 2021-03-15 | End: 2021-03-15 | Stop reason: HOSPADM

## 2021-03-15 RX ORDER — SODIUM CHLORIDE, SODIUM LACTATE, POTASSIUM CHLORIDE, CALCIUM CHLORIDE 600; 310; 30; 20 MG/100ML; MG/100ML; MG/100ML; MG/100ML
50 INJECTION, SOLUTION INTRAVENOUS CONTINUOUS
Status: DISCONTINUED | OUTPATIENT
Start: 2021-03-15 | End: 2021-03-15 | Stop reason: HOSPADM

## 2021-03-15 RX ADMIN — ROCURONIUM BROMIDE 5 MG: 10 INJECTION, SOLUTION INTRAVENOUS at 14:14

## 2021-03-15 RX ADMIN — FENTANYL CITRATE 50 MCG: 50 INJECTION INTRAMUSCULAR; INTRAVENOUS at 14:14

## 2021-03-15 RX ADMIN — SODIUM CHLORIDE, SODIUM LACTATE, POTASSIUM CHLORIDE, AND CALCIUM CHLORIDE: 600; 310; 30; 20 INJECTION, SOLUTION INTRAVENOUS at 14:10

## 2021-03-15 RX ADMIN — DEXAMETHASONE SODIUM PHOSPHATE 4 MG: 4 INJECTION, SOLUTION INTRAMUSCULAR; INTRAVENOUS at 14:36

## 2021-03-15 RX ADMIN — ONDANSETRON 4 MG: 2 INJECTION INTRAMUSCULAR; INTRAVENOUS at 14:36

## 2021-03-15 RX ADMIN — IOPAMIDOL 12.5 ML: 755 INJECTION, SOLUTION INTRAVENOUS at 14:37

## 2021-03-15 RX ADMIN — SUCCINYLCHOLINE CHLORIDE 140 MG: 20 INJECTION, SOLUTION INTRAMUSCULAR; INTRAVENOUS at 14:14

## 2021-03-15 RX ADMIN — GLYCOPYRROLATE 0.2 MG: 0.2 INJECTION INTRAMUSCULAR; INTRAVENOUS at 14:25

## 2021-03-15 RX ADMIN — PROPOFOL 130 MG: 10 INJECTION, EMULSION INTRAVENOUS at 14:14

## 2021-03-15 RX ADMIN — LIDOCAINE HYDROCHLORIDE 100 MG: 20 INJECTION, SOLUTION EPIDURAL; INFILTRATION; INTRACAUDAL; PERINEURAL at 14:14

## 2021-03-15 NOTE — ANESTHESIA PREPROCEDURE EVALUATION
Anesthetic History   No history of anesthetic complications            Review of Systems / Medical History  Patient summary reviewed and pertinent labs reviewed    Pulmonary  Within defined limits            Pertinent negatives: No smoker  Comments: Former smoker    Neuro/Psych   Within defined limits          Comments: Glaucoma Cardiovascular    Hypertension: well controlled          Hyperlipidemia    Exercise tolerance: >4 METS: Denied chest pain, SOB, syncope   Comments: Echo 2016 - normal LV function, mild aortic stenosis    GI/Hepatic/Renal     GERD: well controlled      PUD     Endo/Other    Diabetes    Obesity, arthritis, cancer (H/O breast CA) and anemia    Comments: \"Borderline DM\", on no medications for DM Other Findings   Comments:                        Physical Exam    Airway  Mallampati: II  TM Distance: 4 - 6 cm  Neck ROM: normal range of motion   Mouth opening: Normal     Cardiovascular    Rhythm: regular  Rate: normal         Dental    Dentition: Full upper dentures and Lower partial plate     Pulmonary  Breath sounds clear to auscultation               Abdominal         Other Findings            Anesthetic Plan    ASA: 3  Anesthesia type: general          Induction: Intravenous  Anesthetic plan and risks discussed with: Patient and Son / Daughter

## 2021-03-15 NOTE — H&P
Chief complaint/HPI and PMH:  Please refer to outpatient note below or attached to the chart. ERCP with hx of stones  Today's exam:  LUNGS:  Clear and equal.  COR:  RRR without murmurs heard. NEURO:  A and Oriented fully. I have thoroughly explained the preparation, procedure and sedation process to the patient as well as the risks and alternatives. They will sign informed consent prior to the procedure.         Rylan Caruso MD

## 2021-03-15 NOTE — ANESTHESIA POSTPROCEDURE EVALUATION
Procedure(s):  ENDOSCOPIC RETROGRADE CHOLANGIOPANCREATOGRAPHY (ERCP)/ 30  ENDOSCOPY WITH PROSTHESIS OR STENT REMOVAL  ENDOSCOPIC STONE EXTRACTION/BALLOON SWEEP.    general    Anesthesia Post Evaluation      Multimodal analgesia: multimodal analgesia used between 6 hours prior to anesthesia start to PACU discharge  Patient location during evaluation: bedside  Patient participation: complete - patient participated  Level of consciousness: awake  Pain management: adequate  Airway patency: patent  Anesthetic complications: no  Cardiovascular status: acceptable and stable  Respiratory status: acceptable and room air  Hydration status: acceptable  Comments: intraop jenny treated with robinul  Post anesthesia nausea and vomiting:  none  Final Post Anesthesia Temperature Assessment:  Normothermia (36.0-37.5 degrees C)      INITIAL Post-op Vital signs:   Vitals Value Taken Time   /70 03/15/21 1523   Temp 36.9 °C (98.4 °F) 03/15/21 1523   Pulse 56 03/15/21 1523   Resp 14 03/15/21 1523   SpO2 100 % 03/15/21 1523

## 2021-03-15 NOTE — DISCHARGE INSTRUCTIONS
Endoscopic Retrograde Cholangiopancreatogram (ERCP): What to Expect at [facility]  After you have an endoscopic retrograde cholangiopancreatogram (ERCP). You will be able to go home after your doctor or a nurse checks to make sure you are not having any problems. If you stay in the hospital overnight, you may go home the next day. You may have a sore throat for a day or two after the procedure. This care sheet gives you a general idea about how long it will take for you to recover. But each person recovers at a different pace. Follow the steps below to get better as quickly as possible. How can you care for yourself at home? Activity  · Rest as much as you need to after you go home. · You should be able to go back to your usual activities the day after the procedure. Diet  · Follow your doctor's directions for eating after the procedure. · Drink plenty of fluids (unless your doctor tells you not to). Medicines  · If you have a sore throat the next day, use an over-the-counter spray to numb your throat. Follow-up care is a key part of your treatment and safety. Be sure to make and go to all appointments, and call your doctor if you are having problems. Its also a good idea to know your test results and keep a list of the medicines you take. When should you call for help? Call 911 anytime you think you may need emergency care. For example, call if:  · You vomit blood or what looks like coffee grounds. · You pass maroon or bloody stools. Call your doctor now or go to the emergency room if:  · You have trouble swallowing. · You have belly pain. · Your stools are black and tarlike or have streaks of blood. · You are sick to your stomach and cannot drink fluids. · You have a fever. · You have pain that does not get better after you take your pain medicine. Watch closely for changes in your health, and be sure to contact your doctor if:  · Your throat still hurts after a day or two.   You do not get better as expected. After general anesthesia or intravenous sedation, for 24 hours or while taking prescription Narcotics:  · Limit your activities  · A responsible adult needs to be with you for the next 24 hours  · Do not drive and operate hazardous machinery  · Do not make important personal or business decisions  · Do not drink alcoholic beverages  · If you have not urinated within 8 hours after discharge, and you are experiencing discomfort from urinary retention, please go to the nearest ED. · If you have sleep apnea and have a CPAP machine, please use it for all naps and sleeping. · Please use caution when taking narcotics and any of your home medications that may cause drowsiness. *  Please give a list of your current medications to your Primary Care Provider. *  Please update this list whenever your medications are discontinued, doses are      changed, or new medications (including over-the-counter products) are added. *  Please carry medication information at all times in case of emergency situations. These are general instructions for a healthy lifestyle:  No smoking/ No tobacco products/ Avoid exposure to second hand smoke  Surgeon General's Warning:  Quitting smoking now greatly reduces serious risk to your health. Obesity, smoking, and sedentary lifestyle greatly increases your risk for illness  A healthy diet, regular physical exercise & weight monitoring are important for maintaining a healthy lifestyle    You may be retaining fluid if you have a history of heart failure or if you experience any of the following symptoms:  Weight gain of 3 pounds or more overnight or 5 pounds in a week, increased swelling in our hands or feet or shortness of breath while lying flat in bed. Please call your doctor as soon as you notice any of these symptoms; do not wait until your next office visit.

## 2021-03-15 NOTE — PROCEDURES
Endoscopic Retrograde CholangioPancreatography  (ERCP) Procedure Note    Procedure: ERCP with stent removal and balloon ectraction of stones and sludge    Pre-operative Diagnosis: choledocholithiasis     Post-operative Diagnosis: Choledocholithiasis with stent removal and balloon swee    Recommendations:  F/u 4 weeks outpt with LFT's. Follow up:   See above    Anesthesia/Sedation:  MAC, (see separate report). Procedure Details:  Informed consent was obtained for the procedure, including sedation. Risks of perforation, hemorrhage, pancreatitis, risks of general anesthesia, adverse drug reaction and aspiration were discussed. Based on the pre-procedure assessment, including review of the patient's medical history, medications, allergies, and review of systems, she had been deemed to be an appropriate candidate for anesthesia. Please refer to the anesthesia record for details and dosages of medications. The esophagus was intubated with direct visualization. The esophagus, stomach and duodenum were traversed to the full extent of the endoscope. Retroflexed views of the cardia and fundus were performed. Incomplete visualization was afforded by the duodenoscope. Findings:   ESOPHAGUS:  The esophagus was not well visualized with this endoscope but mucosa that was seen was normal.    STOMACH: The gastric pouch inflated and deflated normally. The mucosal surface and gastric rugae were normal without obvious abnormalities. DUODENUM:  The ampulla was in usual anatomic location. Medium sized diverticulum. ERCP:  Stent was removed. Repeat intubation. Cannulated with 44 jag-tome and .035 wire. The cholangiogram wasn't much help - lots of sludge and air in the ducts. Deviated around the periampullary diverticulum. Generous cystic duct remnant. IH and [facility] ducts grossly dilated. Mulltiple passes with 15-18mm balloon. Most material retrieved with the initial pass.   Thick sludge and soft stones. Repeat cholangiogram with duct occlusion by balloon revealled no further stone debris. EBL: 0 cc's. Pathology speciment:  None. Complications: No apparent complications and the patient tolerated sedation and the procedure well. Attending Attestation: I performed the procedure.     Callie Rock MD

## 2021-03-15 NOTE — PROGRESS NOTES
PACU has been called and ready to accept patient. Patient has been assessed and ready to be transported.

## 2021-04-20 PROCEDURE — 88305 TISSUE EXAM BY PATHOLOGIST: CPT

## 2021-04-21 ENCOUNTER — HOSPITAL ENCOUNTER (OUTPATIENT)
Dept: LAB | Age: 81
Discharge: HOME | End: 2021-04-21

## (undated) DEVICE — NDL PRT INJ NSAF BLNT 18GX1.5 --

## (undated) DEVICE — BLOCK BITE AD 60FR W/ VELC STRP ADDRESSES MOST PT AND

## (undated) DEVICE — (D)SYR 10ML 1/5ML GRAD NSAF -- PKGING CHANGE USE ITEM 338027

## (undated) DEVICE — SYR 50ML SLIP TIP NSAF LF STRL --

## (undated) DEVICE — RETRIEVAL BALLOON CATHETER: Brand: EXTRACTOR™ PRO RX

## (undated) DEVICE — SYR 3ML LL TIP 1/10ML GRAD --

## (undated) DEVICE — SNARE POLYP SM W13MMXL240CM SHTH DIA2.4MM OVL FLX DISP

## (undated) DEVICE — FORCEPS BX 240CM 2.4MM L NDL RAD JAW 4 M00513334

## (undated) DEVICE — ADULT SPO2 SENSOR: Brand: NELLCOR

## (undated) DEVICE — SPHINCTEROTOME: Brand: JAGTOME RX 44

## (undated) DEVICE — DEVICE LCK BILI RAP EXCHG OLPS --

## (undated) DEVICE — CANNULA NSL ORAL AD FOR CAPNOFLEX CO2 O2 AIRLFE

## (undated) DEVICE — SYR 10ML LUER LOK 1/5ML GRAD --

## (undated) DEVICE — GARMENT,MEDLINE,DVT,INT,CALF,MED, GEN2: Brand: MEDLINE

## (undated) DEVICE — KENDALL SCD EXPRESS SLEEVES, KNEE LENGTH, MEDIUM: Brand: KENDALL SCD

## (undated) DEVICE — KENDALL RADIOLUCENT FOAM MONITORING ELECTRODE RECTANGULAR SHAPE: Brand: KENDALL

## (undated) DEVICE — REM POLYHESIVE ADULT PATIENT RETURN ELECTRODE: Brand: VALLEYLAB

## (undated) DEVICE — FORCEPS BX L240CM JAW DIA2.8MM L CAP W/ NDL MIC MESH TOOTH

## (undated) DEVICE — CANNULATING SPHINCTEROTOME: Brand: AUTOTOME™ RX 44

## (undated) DEVICE — SYR 5ML 1/5 GRAD LL NSAF LF --

## (undated) DEVICE — BILIARY STENT WITH RX DELIVERY SYSTEM
Type: IMPLANTABLE DEVICE | Site: BILE DUCT | Status: NON-FUNCTIONAL
Brand: RX BILIARY

## (undated) DEVICE — GUIDEWIRE ENDOSCP L260CM DIA0.035IN STD BILI HYDRPHLC EXT

## (undated) DEVICE — CONTAINER PREFIL FRMLN 40ML --

## (undated) DEVICE — CONNECTOR TBNG OD5-7MM O2 END DISP